# Patient Record
Sex: MALE | Race: WHITE | NOT HISPANIC OR LATINO | URBAN - METROPOLITAN AREA
[De-identification: names, ages, dates, MRNs, and addresses within clinical notes are randomized per-mention and may not be internally consistent; named-entity substitution may affect disease eponyms.]

---

## 2018-07-01 ENCOUNTER — OUTPATIENT (OUTPATIENT)
Dept: OUTPATIENT SERVICES | Facility: HOSPITAL | Age: 68
LOS: 1 days | End: 2018-07-01
Payer: MEDICARE

## 2018-07-01 DIAGNOSIS — Z95.1 PRESENCE OF AORTOCORONARY BYPASS GRAFT: Chronic | ICD-10-CM

## 2018-07-16 DIAGNOSIS — Z71.89 OTHER SPECIFIED COUNSELING: ICD-10-CM

## 2019-02-06 ENCOUNTER — APPOINTMENT (OUTPATIENT)
Dept: FAMILY MEDICINE | Facility: CLINIC | Age: 69
End: 2019-02-06

## 2019-03-01 PROCEDURE — G9005: CPT

## 2020-01-01 ENCOUNTER — INPATIENT (INPATIENT)
Facility: HOSPITAL | Age: 70
LOS: 19 days | DRG: 177 | End: 2020-12-23
Attending: STUDENT IN AN ORGANIZED HEALTH CARE EDUCATION/TRAINING PROGRAM | Admitting: STUDENT IN AN ORGANIZED HEALTH CARE EDUCATION/TRAINING PROGRAM
Payer: MEDICARE

## 2020-01-01 VITALS
TEMPERATURE: 99 F | OXYGEN SATURATION: 90 % | HEART RATE: 95 BPM | DIASTOLIC BLOOD PRESSURE: 81 MMHG | SYSTOLIC BLOOD PRESSURE: 124 MMHG | HEIGHT: 68 IN | RESPIRATION RATE: 105 BRPM | WEIGHT: 162.04 LBS

## 2020-01-01 VITALS
SYSTOLIC BLOOD PRESSURE: 82 MMHG | HEART RATE: 73 BPM | OXYGEN SATURATION: 86 % | RESPIRATION RATE: 26 BRPM | DIASTOLIC BLOOD PRESSURE: 54 MMHG

## 2020-01-01 DIAGNOSIS — R09.02 HYPOXEMIA: ICD-10-CM

## 2020-01-01 DIAGNOSIS — E11.9 TYPE 2 DIABETES MELLITUS WITHOUT COMPLICATIONS: ICD-10-CM

## 2020-01-01 DIAGNOSIS — Z95.1 PRESENCE OF AORTOCORONARY BYPASS GRAFT: Chronic | ICD-10-CM

## 2020-01-01 DIAGNOSIS — U07.1 COVID-19: ICD-10-CM

## 2020-01-01 DIAGNOSIS — R19.7 DIARRHEA, UNSPECIFIED: ICD-10-CM

## 2020-01-01 DIAGNOSIS — I10 ESSENTIAL (PRIMARY) HYPERTENSION: ICD-10-CM

## 2020-01-01 DIAGNOSIS — F43.20 ADJUSTMENT DISORDER, UNSPECIFIED: ICD-10-CM

## 2020-01-01 DIAGNOSIS — Z95.810 PRESENCE OF AUTOMATIC (IMPLANTABLE) CARDIAC DEFIBRILLATOR: Chronic | ICD-10-CM

## 2020-01-01 LAB
24R-OH-CALCIDIOL SERPL-MCNC: 33.9 NG/ML — SIGNIFICANT CHANGE UP (ref 30–80)
A1C WITH ESTIMATED AVERAGE GLUCOSE RESULT: 6.3 % — HIGH (ref 4–5.6)
ACANTHOCYTES BLD QL SMEAR: SLIGHT — SIGNIFICANT CHANGE UP
ALBUMIN SERPL ELPH-MCNC: 1.6 G/DL — LOW (ref 3.3–5.2)
ALBUMIN SERPL ELPH-MCNC: 1.7 G/DL — LOW (ref 3.3–5.2)
ALBUMIN SERPL ELPH-MCNC: 1.8 G/DL — LOW (ref 3.3–5.2)
ALBUMIN SERPL ELPH-MCNC: 1.8 G/DL — LOW (ref 3.3–5.2)
ALBUMIN SERPL ELPH-MCNC: 1.9 G/DL — LOW (ref 3.3–5.2)
ALBUMIN SERPL ELPH-MCNC: 2 G/DL — LOW (ref 3.3–5.2)
ALBUMIN SERPL ELPH-MCNC: 2.1 G/DL — LOW (ref 3.3–5.2)
ALBUMIN SERPL ELPH-MCNC: 2.7 G/DL — LOW (ref 3.3–5.2)
ALBUMIN SERPL ELPH-MCNC: 2.8 G/DL — LOW (ref 3.3–5.2)
ALBUMIN SERPL ELPH-MCNC: 2.9 G/DL — LOW (ref 3.3–5.2)
ALBUMIN SERPL ELPH-MCNC: 2.9 G/DL — LOW (ref 3.3–5.2)
ALBUMIN SERPL ELPH-MCNC: 3 G/DL — LOW (ref 3.3–5.2)
ALBUMIN SERPL ELPH-MCNC: 3.1 G/DL — LOW (ref 3.3–5.2)
ALBUMIN SERPL ELPH-MCNC: 3.2 G/DL — LOW (ref 3.3–5.2)
ALBUMIN SERPL ELPH-MCNC: 3.3 G/DL — SIGNIFICANT CHANGE UP (ref 3.3–5.2)
ALBUMIN SERPL ELPH-MCNC: 3.3 G/DL — SIGNIFICANT CHANGE UP (ref 3.3–5.2)
ALBUMIN SERPL ELPH-MCNC: 3.9 G/DL — SIGNIFICANT CHANGE UP (ref 3.3–5.2)
ALP SERPL-CCNC: 105 U/L — SIGNIFICANT CHANGE UP (ref 40–120)
ALP SERPL-CCNC: 112 U/L — SIGNIFICANT CHANGE UP (ref 40–120)
ALP SERPL-CCNC: 112 U/L — SIGNIFICANT CHANGE UP (ref 40–120)
ALP SERPL-CCNC: 165 U/L — HIGH (ref 40–120)
ALP SERPL-CCNC: 219 U/L — HIGH (ref 40–120)
ALP SERPL-CCNC: 44 U/L — SIGNIFICANT CHANGE UP (ref 40–120)
ALP SERPL-CCNC: 45 U/L — SIGNIFICANT CHANGE UP (ref 40–120)
ALP SERPL-CCNC: 47 U/L — SIGNIFICANT CHANGE UP (ref 40–120)
ALP SERPL-CCNC: 47 U/L — SIGNIFICANT CHANGE UP (ref 40–120)
ALP SERPL-CCNC: 51 U/L — SIGNIFICANT CHANGE UP (ref 40–120)
ALP SERPL-CCNC: 52 U/L — SIGNIFICANT CHANGE UP (ref 40–120)
ALP SERPL-CCNC: 59 U/L — SIGNIFICANT CHANGE UP (ref 40–120)
ALP SERPL-CCNC: 66 U/L — SIGNIFICANT CHANGE UP (ref 40–120)
ALP SERPL-CCNC: 67 U/L — SIGNIFICANT CHANGE UP (ref 40–120)
ALP SERPL-CCNC: 68 U/L — SIGNIFICANT CHANGE UP (ref 40–120)
ALP SERPL-CCNC: 71 U/L — SIGNIFICANT CHANGE UP (ref 40–120)
ALP SERPL-CCNC: 84 U/L — SIGNIFICANT CHANGE UP (ref 40–120)
ALP SERPL-CCNC: 87 U/L — SIGNIFICANT CHANGE UP (ref 40–120)
ALP SERPL-CCNC: 87 U/L — SIGNIFICANT CHANGE UP (ref 40–120)
ALP SERPL-CCNC: 91 U/L — SIGNIFICANT CHANGE UP (ref 40–120)
ALP SERPL-CCNC: 92 U/L — SIGNIFICANT CHANGE UP (ref 40–120)
ALP SERPL-CCNC: 93 U/L — SIGNIFICANT CHANGE UP (ref 40–120)
ALP SERPL-CCNC: 97 U/L — SIGNIFICANT CHANGE UP (ref 40–120)
ALT FLD-CCNC: 16 U/L — SIGNIFICANT CHANGE UP
ALT FLD-CCNC: 17 U/L — SIGNIFICANT CHANGE UP
ALT FLD-CCNC: 17 U/L — SIGNIFICANT CHANGE UP
ALT FLD-CCNC: 18 U/L — SIGNIFICANT CHANGE UP
ALT FLD-CCNC: 20 U/L — SIGNIFICANT CHANGE UP
ALT FLD-CCNC: 21 U/L — SIGNIFICANT CHANGE UP
ALT FLD-CCNC: 21 U/L — SIGNIFICANT CHANGE UP
ALT FLD-CCNC: 23 U/L — SIGNIFICANT CHANGE UP
ALT FLD-CCNC: 23 U/L — SIGNIFICANT CHANGE UP
ALT FLD-CCNC: 28 U/L — SIGNIFICANT CHANGE UP
ALT FLD-CCNC: 29 U/L — SIGNIFICANT CHANGE UP
ALT FLD-CCNC: 29 U/L — SIGNIFICANT CHANGE UP
ALT FLD-CCNC: 30 U/L — SIGNIFICANT CHANGE UP
ALT FLD-CCNC: 30 U/L — SIGNIFICANT CHANGE UP
ALT FLD-CCNC: 31 U/L — SIGNIFICANT CHANGE UP
ALT FLD-CCNC: 31 U/L — SIGNIFICANT CHANGE UP
ALT FLD-CCNC: 32 U/L — SIGNIFICANT CHANGE UP
ALT FLD-CCNC: 37 U/L — SIGNIFICANT CHANGE UP
ALT FLD-CCNC: 38 U/L — SIGNIFICANT CHANGE UP
ANION GAP SERPL CALC-SCNC: 10 MMOL/L — SIGNIFICANT CHANGE UP (ref 5–17)
ANION GAP SERPL CALC-SCNC: 10 MMOL/L — SIGNIFICANT CHANGE UP (ref 5–17)
ANION GAP SERPL CALC-SCNC: 11 MMOL/L — SIGNIFICANT CHANGE UP (ref 5–17)
ANION GAP SERPL CALC-SCNC: 12 MMOL/L — SIGNIFICANT CHANGE UP (ref 5–17)
ANION GAP SERPL CALC-SCNC: 13 MMOL/L — SIGNIFICANT CHANGE UP (ref 5–17)
ANION GAP SERPL CALC-SCNC: 13 MMOL/L — SIGNIFICANT CHANGE UP (ref 5–17)
ANION GAP SERPL CALC-SCNC: 8 MMOL/L — SIGNIFICANT CHANGE UP (ref 5–17)
ANION GAP SERPL CALC-SCNC: 8 MMOL/L — SIGNIFICANT CHANGE UP (ref 5–17)
ANION GAP SERPL CALC-SCNC: 9 MMOL/L — SIGNIFICANT CHANGE UP (ref 5–17)
ANION GAP SERPL CALC-SCNC: 9 MMOL/L — SIGNIFICANT CHANGE UP (ref 5–17)
ANISOCYTOSIS BLD QL: SLIGHT — SIGNIFICANT CHANGE UP
APPEARANCE UR: CLEAR — SIGNIFICANT CHANGE UP
APPEARANCE UR: CLEAR — SIGNIFICANT CHANGE UP
APTT BLD: 36.3 SEC — HIGH (ref 27.5–35.5)
APTT BLD: 38 SEC — HIGH (ref 27.5–35.5)
AST SERPL-CCNC: 15 U/L — SIGNIFICANT CHANGE UP
AST SERPL-CCNC: 18 U/L — SIGNIFICANT CHANGE UP
AST SERPL-CCNC: 19 U/L — SIGNIFICANT CHANGE UP
AST SERPL-CCNC: 19 U/L — SIGNIFICANT CHANGE UP
AST SERPL-CCNC: 20 U/L — SIGNIFICANT CHANGE UP
AST SERPL-CCNC: 21 U/L — SIGNIFICANT CHANGE UP
AST SERPL-CCNC: 21 U/L — SIGNIFICANT CHANGE UP
AST SERPL-CCNC: 25 U/L — SIGNIFICANT CHANGE UP
AST SERPL-CCNC: 26 U/L — SIGNIFICANT CHANGE UP
AST SERPL-CCNC: 26 U/L — SIGNIFICANT CHANGE UP
AST SERPL-CCNC: 27 U/L — SIGNIFICANT CHANGE UP
AST SERPL-CCNC: 28 U/L — SIGNIFICANT CHANGE UP
AST SERPL-CCNC: 31 U/L — SIGNIFICANT CHANGE UP
AST SERPL-CCNC: 31 U/L — SIGNIFICANT CHANGE UP
AST SERPL-CCNC: 33 U/L — SIGNIFICANT CHANGE UP
AST SERPL-CCNC: 38 U/L — SIGNIFICANT CHANGE UP
AST SERPL-CCNC: 39 U/L — SIGNIFICANT CHANGE UP
AST SERPL-CCNC: 45 U/L — HIGH
AST SERPL-CCNC: 46 U/L — HIGH
AST SERPL-CCNC: 52 U/L — HIGH
AST SERPL-CCNC: 54 U/L — HIGH
AST SERPL-CCNC: 63 U/L — HIGH
B PERT DNA SPEC QL NAA+PROBE: SIGNIFICANT CHANGE UP
BACTERIA # UR AUTO: ABNORMAL
BACTERIA # UR AUTO: ABNORMAL
BASE EXCESS BLDA CALC-SCNC: -1.6 MMOL/L — SIGNIFICANT CHANGE UP (ref -2–2)
BASOPHILS # BLD AUTO: 0 K/UL — SIGNIFICANT CHANGE UP (ref 0–0.2)
BASOPHILS # BLD AUTO: 0.01 K/UL — SIGNIFICANT CHANGE UP (ref 0–0.2)
BASOPHILS # BLD AUTO: 0.04 K/UL — SIGNIFICANT CHANGE UP (ref 0–0.2)
BASOPHILS # BLD AUTO: 0.15 K/UL — SIGNIFICANT CHANGE UP (ref 0–0.2)
BASOPHILS # BLD AUTO: 0.17 K/UL — SIGNIFICANT CHANGE UP (ref 0–0.2)
BASOPHILS NFR BLD AUTO: 0 % — SIGNIFICANT CHANGE UP (ref 0–2)
BASOPHILS NFR BLD AUTO: 0.2 % — SIGNIFICANT CHANGE UP (ref 0–2)
BASOPHILS NFR BLD AUTO: 0.2 % — SIGNIFICANT CHANGE UP (ref 0–2)
BASOPHILS NFR BLD AUTO: 0.5 % — SIGNIFICANT CHANGE UP (ref 0–2)
BASOPHILS NFR BLD AUTO: 0.6 % — SIGNIFICANT CHANGE UP (ref 0–2)
BILIRUB DIRECT SERPL-MCNC: 0.1 MG/DL — SIGNIFICANT CHANGE UP (ref 0–0.3)
BILIRUB DIRECT SERPL-MCNC: 0.2 MG/DL — SIGNIFICANT CHANGE UP (ref 0–0.3)
BILIRUB DIRECT SERPL-MCNC: 0.3 MG/DL — SIGNIFICANT CHANGE UP (ref 0–0.3)
BILIRUB DIRECT SERPL-MCNC: 0.4 MG/DL — HIGH (ref 0–0.3)
BILIRUB INDIRECT FLD-MCNC: 0.3 MG/DL — SIGNIFICANT CHANGE UP (ref 0.2–1)
BILIRUB INDIRECT FLD-MCNC: 0.4 MG/DL — SIGNIFICANT CHANGE UP (ref 0.2–1)
BILIRUB INDIRECT FLD-MCNC: 0.6 MG/DL — SIGNIFICANT CHANGE UP (ref 0.2–1)
BILIRUB INDIRECT FLD-MCNC: 0.7 MG/DL — SIGNIFICANT CHANGE UP (ref 0.2–1)
BILIRUB INDIRECT FLD-MCNC: 0.9 MG/DL — SIGNIFICANT CHANGE UP (ref 0.2–1)
BILIRUB INDIRECT FLD-MCNC: 1 MG/DL — SIGNIFICANT CHANGE UP (ref 0.2–1)
BILIRUB INDIRECT FLD-MCNC: 1.2 MG/DL — HIGH (ref 0.2–1)
BILIRUB INDIRECT FLD-MCNC: 1.3 MG/DL — HIGH (ref 0.2–1)
BILIRUB INDIRECT FLD-MCNC: SIGNIFICANT CHANGE UP MG/DL (ref 0.2–1)
BILIRUB SERPL-MCNC: 0.4 MG/DL — SIGNIFICANT CHANGE UP (ref 0.4–2)
BILIRUB SERPL-MCNC: 0.4 MG/DL — SIGNIFICANT CHANGE UP (ref 0.4–2)
BILIRUB SERPL-MCNC: 0.5 MG/DL — SIGNIFICANT CHANGE UP (ref 0.4–2)
BILIRUB SERPL-MCNC: 0.5 MG/DL — SIGNIFICANT CHANGE UP (ref 0.4–2)
BILIRUB SERPL-MCNC: 0.6 MG/DL — SIGNIFICANT CHANGE UP (ref 0.4–2)
BILIRUB SERPL-MCNC: 0.8 MG/DL — SIGNIFICANT CHANGE UP (ref 0.4–2)
BILIRUB SERPL-MCNC: 0.9 MG/DL — SIGNIFICANT CHANGE UP (ref 0.4–2)
BILIRUB SERPL-MCNC: 1 MG/DL — SIGNIFICANT CHANGE UP (ref 0.4–2)
BILIRUB SERPL-MCNC: 1.1 MG/DL — SIGNIFICANT CHANGE UP (ref 0.4–2)
BILIRUB SERPL-MCNC: 1.1 MG/DL — SIGNIFICANT CHANGE UP (ref 0.4–2)
BILIRUB SERPL-MCNC: 1.2 MG/DL — SIGNIFICANT CHANGE UP (ref 0.4–2)
BILIRUB SERPL-MCNC: 1.3 MG/DL — SIGNIFICANT CHANGE UP (ref 0.4–2)
BILIRUB SERPL-MCNC: 1.4 MG/DL — SIGNIFICANT CHANGE UP (ref 0.4–2)
BILIRUB SERPL-MCNC: 1.5 MG/DL — SIGNIFICANT CHANGE UP (ref 0.4–2)
BILIRUB SERPL-MCNC: 1.5 MG/DL — SIGNIFICANT CHANGE UP (ref 0.4–2)
BILIRUB SERPL-MCNC: 1.6 MG/DL — SIGNIFICANT CHANGE UP (ref 0.4–2)
BILIRUB SERPL-MCNC: 1.6 MG/DL — SIGNIFICANT CHANGE UP (ref 0.4–2)
BILIRUB UR-MCNC: NEGATIVE — SIGNIFICANT CHANGE UP
BILIRUB UR-MCNC: NEGATIVE — SIGNIFICANT CHANGE UP
BUN SERPL-MCNC: 22 MG/DL — HIGH (ref 8–20)
BUN SERPL-MCNC: 26 MG/DL — HIGH (ref 8–20)
BUN SERPL-MCNC: 32 MG/DL — HIGH (ref 8–20)
BUN SERPL-MCNC: 35 MG/DL — HIGH (ref 8–20)
BUN SERPL-MCNC: 37 MG/DL — HIGH (ref 8–20)
BUN SERPL-MCNC: 38 MG/DL — HIGH (ref 8–20)
BUN SERPL-MCNC: 39 MG/DL — HIGH (ref 8–20)
BUN SERPL-MCNC: 40 MG/DL — HIGH (ref 8–20)
BUN SERPL-MCNC: 40 MG/DL — HIGH (ref 8–20)
BUN SERPL-MCNC: 45 MG/DL — HIGH (ref 8–20)
BUN SERPL-MCNC: 50 MG/DL — HIGH (ref 8–20)
BUN SERPL-MCNC: 50 MG/DL — HIGH (ref 8–20)
BUN SERPL-MCNC: 52 MG/DL — HIGH (ref 8–20)
BUN SERPL-MCNC: 52 MG/DL — HIGH (ref 8–20)
BUN SERPL-MCNC: 53 MG/DL — HIGH (ref 8–20)
BUN SERPL-MCNC: 56 MG/DL — HIGH (ref 8–20)
BURR CELLS BLD QL SMEAR: PRESENT — SIGNIFICANT CHANGE UP
C DIFF BY PCR RESULT: SIGNIFICANT CHANGE UP
C DIFF TOX GENS STL QL NAA+PROBE: SIGNIFICANT CHANGE UP
C PNEUM DNA SPEC QL NAA+PROBE: SIGNIFICANT CHANGE UP
CALCIUM SERPL-MCNC: 7.8 MG/DL — LOW (ref 8.6–10.2)
CALCIUM SERPL-MCNC: 8 MG/DL — LOW (ref 8.6–10.2)
CALCIUM SERPL-MCNC: 8.1 MG/DL — LOW (ref 8.6–10.2)
CALCIUM SERPL-MCNC: 8.2 MG/DL — LOW (ref 8.6–10.2)
CALCIUM SERPL-MCNC: 8.4 MG/DL — LOW (ref 8.6–10.2)
CALCIUM SERPL-MCNC: 8.4 MG/DL — LOW (ref 8.6–10.2)
CALCIUM SERPL-MCNC: 8.6 MG/DL — SIGNIFICANT CHANGE UP (ref 8.6–10.2)
CALCIUM SERPL-MCNC: 8.7 MG/DL — SIGNIFICANT CHANGE UP (ref 8.6–10.2)
CALCIUM SERPL-MCNC: 8.7 MG/DL — SIGNIFICANT CHANGE UP (ref 8.6–10.2)
CALCIUM SERPL-MCNC: 9 MG/DL — SIGNIFICANT CHANGE UP (ref 8.6–10.2)
CALCIUM SERPL-MCNC: 9 MG/DL — SIGNIFICANT CHANGE UP (ref 8.6–10.2)
CALCIUM SERPL-MCNC: 9.2 MG/DL — SIGNIFICANT CHANGE UP (ref 8.6–10.2)
CALCIUM SERPL-MCNC: 9.2 MG/DL — SIGNIFICANT CHANGE UP (ref 8.6–10.2)
CALCIUM SERPL-MCNC: 9.3 MG/DL — SIGNIFICANT CHANGE UP (ref 8.6–10.2)
CHLORIDE SERPL-SCNC: 100 MMOL/L — SIGNIFICANT CHANGE UP (ref 98–107)
CHLORIDE SERPL-SCNC: 101 MMOL/L — SIGNIFICANT CHANGE UP (ref 98–107)
CHLORIDE SERPL-SCNC: 102 MMOL/L — SIGNIFICANT CHANGE UP (ref 98–107)
CHLORIDE SERPL-SCNC: 102 MMOL/L — SIGNIFICANT CHANGE UP (ref 98–107)
CHLORIDE SERPL-SCNC: 103 MMOL/L — SIGNIFICANT CHANGE UP (ref 98–107)
CHLORIDE SERPL-SCNC: 104 MMOL/L — SIGNIFICANT CHANGE UP (ref 98–107)
CHLORIDE SERPL-SCNC: 105 MMOL/L — SIGNIFICANT CHANGE UP (ref 98–107)
CHLORIDE SERPL-SCNC: 106 MMOL/L — SIGNIFICANT CHANGE UP (ref 98–107)
CHLORIDE SERPL-SCNC: 106 MMOL/L — SIGNIFICANT CHANGE UP (ref 98–107)
CHLORIDE SERPL-SCNC: 107 MMOL/L — SIGNIFICANT CHANGE UP (ref 98–107)
CHLORIDE SERPL-SCNC: 107 MMOL/L — SIGNIFICANT CHANGE UP (ref 98–107)
CHLORIDE SERPL-SCNC: 114 MMOL/L — HIGH (ref 98–107)
CHLORIDE SERPL-SCNC: 95 MMOL/L — LOW (ref 98–107)
CHLORIDE SERPL-SCNC: 97 MMOL/L — LOW (ref 98–107)
CHLORIDE SERPL-SCNC: 98 MMOL/L — SIGNIFICANT CHANGE UP (ref 98–107)
CHLORIDE SERPL-SCNC: 99 MMOL/L — SIGNIFICANT CHANGE UP (ref 98–107)
CK SERPL-CCNC: 48 U/L — SIGNIFICANT CHANGE UP (ref 30–200)
CO2 SERPL-SCNC: 19 MMOL/L — LOW (ref 22–29)
CO2 SERPL-SCNC: 19 MMOL/L — LOW (ref 22–29)
CO2 SERPL-SCNC: 20 MMOL/L — LOW (ref 22–29)
CO2 SERPL-SCNC: 21 MMOL/L — LOW (ref 22–29)
CO2 SERPL-SCNC: 22 MMOL/L — SIGNIFICANT CHANGE UP (ref 22–29)
CO2 SERPL-SCNC: 22 MMOL/L — SIGNIFICANT CHANGE UP (ref 22–29)
CO2 SERPL-SCNC: 23 MMOL/L — SIGNIFICANT CHANGE UP (ref 22–29)
CO2 SERPL-SCNC: 24 MMOL/L — SIGNIFICANT CHANGE UP (ref 22–29)
CO2 SERPL-SCNC: 25 MMOL/L — SIGNIFICANT CHANGE UP (ref 22–29)
CO2 SERPL-SCNC: 25 MMOL/L — SIGNIFICANT CHANGE UP (ref 22–29)
CO2 SERPL-SCNC: 26 MMOL/L — SIGNIFICANT CHANGE UP (ref 22–29)
CO2 SERPL-SCNC: 26 MMOL/L — SIGNIFICANT CHANGE UP (ref 22–29)
CO2 SERPL-SCNC: 27 MMOL/L — SIGNIFICANT CHANGE UP (ref 22–29)
CO2 SERPL-SCNC: 28 MMOL/L — SIGNIFICANT CHANGE UP (ref 22–29)
COLOR SPEC: YELLOW — SIGNIFICANT CHANGE UP
COLOR SPEC: YELLOW — SIGNIFICANT CHANGE UP
CREAT SERPL-MCNC: 0.48 MG/DL — LOW (ref 0.5–1.3)
CREAT SERPL-MCNC: 0.5 MG/DL — SIGNIFICANT CHANGE UP (ref 0.5–1.3)
CREAT SERPL-MCNC: 0.53 MG/DL — SIGNIFICANT CHANGE UP (ref 0.5–1.3)
CREAT SERPL-MCNC: 0.59 MG/DL — SIGNIFICANT CHANGE UP (ref 0.5–1.3)
CREAT SERPL-MCNC: 0.62 MG/DL — SIGNIFICANT CHANGE UP (ref 0.5–1.3)
CREAT SERPL-MCNC: 0.63 MG/DL — SIGNIFICANT CHANGE UP (ref 0.5–1.3)
CREAT SERPL-MCNC: 0.64 MG/DL — SIGNIFICANT CHANGE UP (ref 0.5–1.3)
CREAT SERPL-MCNC: 0.67 MG/DL — SIGNIFICANT CHANGE UP (ref 0.5–1.3)
CREAT SERPL-MCNC: 0.69 MG/DL — SIGNIFICANT CHANGE UP (ref 0.5–1.3)
CREAT SERPL-MCNC: 0.73 MG/DL — SIGNIFICANT CHANGE UP (ref 0.5–1.3)
CREAT SERPL-MCNC: 0.76 MG/DL — SIGNIFICANT CHANGE UP (ref 0.5–1.3)
CREAT SERPL-MCNC: 0.8 MG/DL — SIGNIFICANT CHANGE UP (ref 0.5–1.3)
CREAT SERPL-MCNC: 0.82 MG/DL — SIGNIFICANT CHANGE UP (ref 0.5–1.3)
CREAT SERPL-MCNC: 0.84 MG/DL — SIGNIFICANT CHANGE UP (ref 0.5–1.3)
CREAT SERPL-MCNC: 0.84 MG/DL — SIGNIFICANT CHANGE UP (ref 0.5–1.3)
CREAT SERPL-MCNC: 0.85 MG/DL — SIGNIFICANT CHANGE UP (ref 0.5–1.3)
CREAT SERPL-MCNC: 0.85 MG/DL — SIGNIFICANT CHANGE UP (ref 0.5–1.3)
CREAT SERPL-MCNC: 0.87 MG/DL — SIGNIFICANT CHANGE UP (ref 0.5–1.3)
CREAT SERPL-MCNC: 0.88 MG/DL — SIGNIFICANT CHANGE UP (ref 0.5–1.3)
CREAT SERPL-MCNC: 0.89 MG/DL — SIGNIFICANT CHANGE UP (ref 0.5–1.3)
CREAT SERPL-MCNC: 0.95 MG/DL — SIGNIFICANT CHANGE UP (ref 0.5–1.3)
CREAT SERPL-MCNC: 0.97 MG/DL — SIGNIFICANT CHANGE UP (ref 0.5–1.3)
CREAT SERPL-MCNC: 1.06 MG/DL — SIGNIFICANT CHANGE UP (ref 0.5–1.3)
CRP SERPL-MCNC: 0.9 MG/DL — HIGH (ref 0–0.4)
CRP SERPL-MCNC: 1.22 MG/DL — HIGH (ref 0–0.4)
CRP SERPL-MCNC: 1.24 MG/DL — HIGH (ref 0–0.4)
CRP SERPL-MCNC: 1.57 MG/DL — HIGH (ref 0–0.4)
CRP SERPL-MCNC: 10.23 MG/DL — HIGH (ref 0–0.4)
CRP SERPL-MCNC: 12.8 MG/DL — HIGH (ref 0–0.4)
CRP SERPL-MCNC: 15.69 MG/DL — HIGH (ref 0–0.4)
CRP SERPL-MCNC: 5.5 MG/DL — HIGH (ref 0–0.4)
CULTURE RESULTS: SIGNIFICANT CHANGE UP
D DIMER BLD IA.RAPID-MCNC: 170 NG/ML DDU — SIGNIFICANT CHANGE UP
D DIMER BLD IA.RAPID-MCNC: 220 NG/ML DDU — SIGNIFICANT CHANGE UP
D DIMER BLD IA.RAPID-MCNC: 578 NG/ML DDU — HIGH
D DIMER BLD IA.RAPID-MCNC: <150 NG/ML DDU — SIGNIFICANT CHANGE UP
DACRYOCYTES BLD QL SMEAR: SLIGHT — SIGNIFICANT CHANGE UP
DIFF PNL FLD: ABNORMAL
DIFF PNL FLD: NEGATIVE — SIGNIFICANT CHANGE UP
DIGOXIN SERPL-MCNC: 1 NG/ML — SIGNIFICANT CHANGE UP (ref 0.8–2)
ELLIPTOCYTES BLD QL SMEAR: SLIGHT — SIGNIFICANT CHANGE UP
ELLIPTOCYTES BLD QL SMEAR: SLIGHT — SIGNIFICANT CHANGE UP
EOSINOPHIL # BLD AUTO: 0 K/UL — SIGNIFICANT CHANGE UP (ref 0–0.5)
EOSINOPHIL # BLD AUTO: 0.02 K/UL — SIGNIFICANT CHANGE UP (ref 0–0.5)
EOSINOPHIL # BLD AUTO: 0.04 K/UL — SIGNIFICANT CHANGE UP (ref 0–0.5)
EOSINOPHIL NFR BLD AUTO: 0 % — SIGNIFICANT CHANGE UP (ref 0–6)
EOSINOPHIL NFR BLD AUTO: 0.1 % — SIGNIFICANT CHANGE UP (ref 0–6)
EOSINOPHIL NFR BLD AUTO: 0.2 % — SIGNIFICANT CHANGE UP (ref 0–6)
EPI CELLS # UR: SIGNIFICANT CHANGE UP
EPI CELLS # UR: SIGNIFICANT CHANGE UP
EPO SERPL-MCNC: 82.7 MIU/ML — HIGH (ref 2.6–18.5)
ERYTHROCYTE [SEDIMENTATION RATE] IN BLOOD: 15 MM/HR — SIGNIFICANT CHANGE UP (ref 0–20)
ESTIMATED AVERAGE GLUCOSE: 134 MG/DL — HIGH (ref 68–114)
FERRITIN SERPL-MCNC: 1050 NG/ML — HIGH (ref 30–400)
FERRITIN SERPL-MCNC: 1275 NG/ML — HIGH (ref 30–400)
FERRITIN SERPL-MCNC: 1398 NG/ML — HIGH (ref 30–400)
FERRITIN SERPL-MCNC: 1607 NG/ML — HIGH (ref 30–400)
FERRITIN SERPL-MCNC: 1906 NG/ML — HIGH (ref 30–400)
FERRITIN SERPL-MCNC: 2557 NG/ML — HIGH (ref 30–400)
FERRITIN SERPL-MCNC: 823 NG/ML — HIGH (ref 30–400)
FERRITIN SERPL-MCNC: 978 NG/ML — HIGH (ref 30–400)
FIBRINOGEN PPP-MCNC: 587 MG/DL — HIGH (ref 290–520)
FLU A RESULT: SIGNIFICANT CHANGE UP
FLU A RESULT: SIGNIFICANT CHANGE UP
FLUAV AG NPH QL: SIGNIFICANT CHANGE UP
FLUAV H1 2009 PAND RNA SPEC QL NAA+PROBE: SIGNIFICANT CHANGE UP
FLUAV H1 RNA SPEC QL NAA+PROBE: SIGNIFICANT CHANGE UP
FLUAV H3 RNA SPEC QL NAA+PROBE: SIGNIFICANT CHANGE UP
FLUAV SUBTYP SPEC NAA+PROBE: SIGNIFICANT CHANGE UP
FLUBV AG NPH QL: SIGNIFICANT CHANGE UP
FLUBV RNA SPEC QL NAA+PROBE: SIGNIFICANT CHANGE UP
GAS PNL BLDA: SIGNIFICANT CHANGE UP
GIANT PLATELETS BLD QL SMEAR: PRESENT — SIGNIFICANT CHANGE UP
GIANT PLATELETS BLD QL SMEAR: PRESENT — SIGNIFICANT CHANGE UP
GLUCOSE BLDC GLUCOMTR-MCNC: 100 MG/DL — HIGH (ref 70–99)
GLUCOSE BLDC GLUCOMTR-MCNC: 106 MG/DL — HIGH (ref 70–99)
GLUCOSE BLDC GLUCOMTR-MCNC: 111 MG/DL — HIGH (ref 70–99)
GLUCOSE BLDC GLUCOMTR-MCNC: 116 MG/DL — HIGH (ref 70–99)
GLUCOSE BLDC GLUCOMTR-MCNC: 118 MG/DL — HIGH (ref 70–99)
GLUCOSE BLDC GLUCOMTR-MCNC: 119 MG/DL — HIGH (ref 70–99)
GLUCOSE BLDC GLUCOMTR-MCNC: 120 MG/DL — HIGH (ref 70–99)
GLUCOSE BLDC GLUCOMTR-MCNC: 121 MG/DL — HIGH (ref 70–99)
GLUCOSE BLDC GLUCOMTR-MCNC: 123 MG/DL — HIGH (ref 70–99)
GLUCOSE BLDC GLUCOMTR-MCNC: 125 MG/DL — HIGH (ref 70–99)
GLUCOSE BLDC GLUCOMTR-MCNC: 126 MG/DL — HIGH (ref 70–99)
GLUCOSE BLDC GLUCOMTR-MCNC: 128 MG/DL — HIGH (ref 70–99)
GLUCOSE BLDC GLUCOMTR-MCNC: 129 MG/DL — HIGH (ref 70–99)
GLUCOSE BLDC GLUCOMTR-MCNC: 131 MG/DL — HIGH (ref 70–99)
GLUCOSE BLDC GLUCOMTR-MCNC: 132 MG/DL — HIGH (ref 70–99)
GLUCOSE BLDC GLUCOMTR-MCNC: 133 MG/DL — HIGH (ref 70–99)
GLUCOSE BLDC GLUCOMTR-MCNC: 135 MG/DL — HIGH (ref 70–99)
GLUCOSE BLDC GLUCOMTR-MCNC: 135 MG/DL — HIGH (ref 70–99)
GLUCOSE BLDC GLUCOMTR-MCNC: 136 MG/DL — HIGH (ref 70–99)
GLUCOSE BLDC GLUCOMTR-MCNC: 136 MG/DL — HIGH (ref 70–99)
GLUCOSE BLDC GLUCOMTR-MCNC: 138 MG/DL — HIGH (ref 70–99)
GLUCOSE BLDC GLUCOMTR-MCNC: 140 MG/DL — HIGH (ref 70–99)
GLUCOSE BLDC GLUCOMTR-MCNC: 141 MG/DL — HIGH (ref 70–99)
GLUCOSE BLDC GLUCOMTR-MCNC: 141 MG/DL — HIGH (ref 70–99)
GLUCOSE BLDC GLUCOMTR-MCNC: 143 MG/DL — HIGH (ref 70–99)
GLUCOSE BLDC GLUCOMTR-MCNC: 143 MG/DL — HIGH (ref 70–99)
GLUCOSE BLDC GLUCOMTR-MCNC: 145 MG/DL — HIGH (ref 70–99)
GLUCOSE BLDC GLUCOMTR-MCNC: 146 MG/DL — HIGH (ref 70–99)
GLUCOSE BLDC GLUCOMTR-MCNC: 146 MG/DL — HIGH (ref 70–99)
GLUCOSE BLDC GLUCOMTR-MCNC: 150 MG/DL — HIGH (ref 70–99)
GLUCOSE BLDC GLUCOMTR-MCNC: 150 MG/DL — HIGH (ref 70–99)
GLUCOSE BLDC GLUCOMTR-MCNC: 153 MG/DL — HIGH (ref 70–99)
GLUCOSE BLDC GLUCOMTR-MCNC: 158 MG/DL — HIGH (ref 70–99)
GLUCOSE BLDC GLUCOMTR-MCNC: 159 MG/DL — HIGH (ref 70–99)
GLUCOSE BLDC GLUCOMTR-MCNC: 161 MG/DL — HIGH (ref 70–99)
GLUCOSE BLDC GLUCOMTR-MCNC: 164 MG/DL — HIGH (ref 70–99)
GLUCOSE BLDC GLUCOMTR-MCNC: 164 MG/DL — HIGH (ref 70–99)
GLUCOSE BLDC GLUCOMTR-MCNC: 168 MG/DL — HIGH (ref 70–99)
GLUCOSE BLDC GLUCOMTR-MCNC: 169 MG/DL — HIGH (ref 70–99)
GLUCOSE BLDC GLUCOMTR-MCNC: 169 MG/DL — HIGH (ref 70–99)
GLUCOSE BLDC GLUCOMTR-MCNC: 173 MG/DL — HIGH (ref 70–99)
GLUCOSE BLDC GLUCOMTR-MCNC: 173 MG/DL — HIGH (ref 70–99)
GLUCOSE BLDC GLUCOMTR-MCNC: 175 MG/DL — HIGH (ref 70–99)
GLUCOSE BLDC GLUCOMTR-MCNC: 179 MG/DL — HIGH (ref 70–99)
GLUCOSE BLDC GLUCOMTR-MCNC: 180 MG/DL — HIGH (ref 70–99)
GLUCOSE BLDC GLUCOMTR-MCNC: 182 MG/DL — HIGH (ref 70–99)
GLUCOSE BLDC GLUCOMTR-MCNC: 183 MG/DL — HIGH (ref 70–99)
GLUCOSE BLDC GLUCOMTR-MCNC: 184 MG/DL — HIGH (ref 70–99)
GLUCOSE BLDC GLUCOMTR-MCNC: 186 MG/DL — HIGH (ref 70–99)
GLUCOSE BLDC GLUCOMTR-MCNC: 187 MG/DL — HIGH (ref 70–99)
GLUCOSE BLDC GLUCOMTR-MCNC: 188 MG/DL — HIGH (ref 70–99)
GLUCOSE BLDC GLUCOMTR-MCNC: 189 MG/DL — HIGH (ref 70–99)
GLUCOSE BLDC GLUCOMTR-MCNC: 191 MG/DL — HIGH (ref 70–99)
GLUCOSE BLDC GLUCOMTR-MCNC: 192 MG/DL — HIGH (ref 70–99)
GLUCOSE BLDC GLUCOMTR-MCNC: 196 MG/DL — HIGH (ref 70–99)
GLUCOSE BLDC GLUCOMTR-MCNC: 200 MG/DL — HIGH (ref 70–99)
GLUCOSE BLDC GLUCOMTR-MCNC: 201 MG/DL — HIGH (ref 70–99)
GLUCOSE BLDC GLUCOMTR-MCNC: 201 MG/DL — HIGH (ref 70–99)
GLUCOSE BLDC GLUCOMTR-MCNC: 202 MG/DL — HIGH (ref 70–99)
GLUCOSE BLDC GLUCOMTR-MCNC: 202 MG/DL — HIGH (ref 70–99)
GLUCOSE BLDC GLUCOMTR-MCNC: 224 MG/DL — HIGH (ref 70–99)
GLUCOSE BLDC GLUCOMTR-MCNC: 233 MG/DL — HIGH (ref 70–99)
GLUCOSE BLDC GLUCOMTR-MCNC: 235 MG/DL — HIGH (ref 70–99)
GLUCOSE BLDC GLUCOMTR-MCNC: 274 MG/DL — HIGH (ref 70–99)
GLUCOSE BLDC GLUCOMTR-MCNC: 361 MG/DL — HIGH (ref 70–99)
GLUCOSE BLDC GLUCOMTR-MCNC: 68 MG/DL — LOW (ref 70–99)
GLUCOSE BLDC GLUCOMTR-MCNC: 71 MG/DL — SIGNIFICANT CHANGE UP (ref 70–99)
GLUCOSE BLDC GLUCOMTR-MCNC: 77 MG/DL — SIGNIFICANT CHANGE UP (ref 70–99)
GLUCOSE BLDC GLUCOMTR-MCNC: 91 MG/DL — SIGNIFICANT CHANGE UP (ref 70–99)
GLUCOSE SERPL-MCNC: 101 MG/DL — HIGH (ref 70–99)
GLUCOSE SERPL-MCNC: 117 MG/DL — HIGH (ref 70–99)
GLUCOSE SERPL-MCNC: 128 MG/DL — HIGH (ref 70–99)
GLUCOSE SERPL-MCNC: 132 MG/DL — HIGH (ref 70–99)
GLUCOSE SERPL-MCNC: 132 MG/DL — HIGH (ref 70–99)
GLUCOSE SERPL-MCNC: 135 MG/DL — HIGH (ref 70–99)
GLUCOSE SERPL-MCNC: 154 MG/DL — HIGH (ref 70–99)
GLUCOSE SERPL-MCNC: 167 MG/DL — HIGH (ref 70–99)
GLUCOSE SERPL-MCNC: 170 MG/DL — HIGH (ref 70–99)
GLUCOSE SERPL-MCNC: 185 MG/DL — HIGH (ref 70–99)
GLUCOSE SERPL-MCNC: 204 MG/DL — HIGH (ref 70–99)
GLUCOSE SERPL-MCNC: 217 MG/DL — HIGH (ref 70–99)
GLUCOSE SERPL-MCNC: 226 MG/DL — HIGH (ref 70–99)
GLUCOSE SERPL-MCNC: 240 MG/DL — HIGH (ref 70–99)
GLUCOSE SERPL-MCNC: 59 MG/DL — LOW (ref 70–99)
GLUCOSE SERPL-MCNC: 93 MG/DL — SIGNIFICANT CHANGE UP (ref 70–99)
GLUCOSE UR QL: 250 MG/DL
GLUCOSE UR QL: NEGATIVE MG/DL — SIGNIFICANT CHANGE UP
GRAN CASTS # UR COMP ASSIST: ABNORMAL /LPF
HADV DNA SPEC QL NAA+PROBE: SIGNIFICANT CHANGE UP
HCO3 BLDA-SCNC: 23 MMOL/L — SIGNIFICANT CHANGE UP (ref 20–26)
HCOV PNL SPEC NAA+PROBE: SIGNIFICANT CHANGE UP
HCT VFR BLD CALC: 40 % — SIGNIFICANT CHANGE UP (ref 39–50)
HCT VFR BLD CALC: 41.6 % — SIGNIFICANT CHANGE UP (ref 39–50)
HCT VFR BLD CALC: 41.8 % — SIGNIFICANT CHANGE UP (ref 39–50)
HCT VFR BLD CALC: 41.9 % — SIGNIFICANT CHANGE UP (ref 39–50)
HCT VFR BLD CALC: 43.7 % — SIGNIFICANT CHANGE UP (ref 39–50)
HCT VFR BLD CALC: 44.3 % — SIGNIFICANT CHANGE UP (ref 39–50)
HCT VFR BLD CALC: 44.5 % — SIGNIFICANT CHANGE UP (ref 39–50)
HCT VFR BLD CALC: 44.9 % — SIGNIFICANT CHANGE UP (ref 39–50)
HCT VFR BLD CALC: 47.4 % — SIGNIFICANT CHANGE UP (ref 39–50)
HCT VFR BLD CALC: 49.2 % — SIGNIFICANT CHANGE UP (ref 39–50)
HCT VFR BLD CALC: 51.1 % — HIGH (ref 39–50)
HCT VFR BLD CALC: 51.5 % — HIGH (ref 39–50)
HCT VFR BLD CALC: 56 % — HIGH (ref 39–50)
HCT VFR BLD CALC: 56.2 % — HIGH (ref 39–50)
HCV AB S/CO SERPL IA: 0.09 S/CO — SIGNIFICANT CHANGE UP (ref 0–0.99)
HCV AB SERPL-IMP: SIGNIFICANT CHANGE UP
HGB BLD-MCNC: 13.6 G/DL — SIGNIFICANT CHANGE UP (ref 13–17)
HGB BLD-MCNC: 14 G/DL — SIGNIFICANT CHANGE UP (ref 13–17)
HGB BLD-MCNC: 14.1 G/DL — SIGNIFICANT CHANGE UP (ref 13–17)
HGB BLD-MCNC: 14.2 G/DL — SIGNIFICANT CHANGE UP (ref 13–17)
HGB BLD-MCNC: 14.6 G/DL — SIGNIFICANT CHANGE UP (ref 13–17)
HGB BLD-MCNC: 14.9 G/DL — SIGNIFICANT CHANGE UP (ref 13–17)
HGB BLD-MCNC: 15 G/DL — SIGNIFICANT CHANGE UP (ref 13–17)
HGB BLD-MCNC: 15 G/DL — SIGNIFICANT CHANGE UP (ref 13–17)
HGB BLD-MCNC: 15.9 G/DL — SIGNIFICANT CHANGE UP (ref 13–17)
HGB BLD-MCNC: 16.5 G/DL — SIGNIFICANT CHANGE UP (ref 13–17)
HGB BLD-MCNC: 17.2 G/DL — HIGH (ref 13–17)
HGB BLD-MCNC: 17.4 G/DL — HIGH (ref 13–17)
HGB BLD-MCNC: 18.7 G/DL — HIGH (ref 13–17)
HGB BLD-MCNC: 19.3 G/DL — CRITICAL HIGH (ref 13–17)
HMPV RNA SPEC QL NAA+PROBE: SIGNIFICANT CHANGE UP
HPIV1 RNA SPEC QL NAA+PROBE: SIGNIFICANT CHANGE UP
HPIV2 RNA SPEC QL NAA+PROBE: SIGNIFICANT CHANGE UP
HPIV3 RNA SPEC QL NAA+PROBE: SIGNIFICANT CHANGE UP
HPIV4 RNA SPEC QL NAA+PROBE: SIGNIFICANT CHANGE UP
HYALINE CASTS # UR AUTO: ABNORMAL /LPF
IMM GRANULOCYTES NFR BLD AUTO: 0.4 % — SIGNIFICANT CHANGE UP (ref 0–1.5)
IMM GRANULOCYTES NFR BLD AUTO: 0.7 % — SIGNIFICANT CHANGE UP (ref 0–1.5)
IMM GRANULOCYTES NFR BLD AUTO: 1.6 % — HIGH (ref 0–1.5)
IMM GRANULOCYTES NFR BLD AUTO: 3.2 % — HIGH (ref 0–1.5)
IMM GRANULOCYTES NFR BLD AUTO: 3.4 % — HIGH (ref 0–1.5)
INR BLD: 1.37 RATIO — HIGH (ref 0.88–1.16)
INR BLD: 1.95 RATIO — HIGH (ref 0.88–1.16)
KETONES UR-MCNC: ABNORMAL
KETONES UR-MCNC: NEGATIVE — SIGNIFICANT CHANGE UP
LACTATE BLDV-MCNC: 0.8 MMOL/L — SIGNIFICANT CHANGE UP (ref 0.5–2)
LACTATE BLDV-MCNC: 3 MMOL/L — HIGH (ref 0.5–2)
LACTATE SERPL-SCNC: 2.4 MMOL/L — HIGH (ref 0.5–2)
LACTATE SERPL-SCNC: 2.8 MMOL/L — HIGH (ref 0.5–2)
LDH SERPL L TO P-CCNC: 324 U/L — HIGH (ref 98–192)
LDH SERPL L TO P-CCNC: 347 U/L — HIGH (ref 98–192)
LDH SERPL L TO P-CCNC: 390 U/L — HIGH (ref 98–192)
LDH SERPL L TO P-CCNC: 411 U/L — HIGH (ref 98–192)
LDH SERPL L TO P-CCNC: 420 U/L — HIGH (ref 98–192)
LDH SERPL L TO P-CCNC: 463 U/L — HIGH (ref 98–192)
LEUKOCYTE ESTERASE UR-ACNC: NEGATIVE — SIGNIFICANT CHANGE UP
LEUKOCYTE ESTERASE UR-ACNC: NEGATIVE — SIGNIFICANT CHANGE UP
LYMPHOCYTES # BLD AUTO: 0 % — LOW (ref 13–44)
LYMPHOCYTES # BLD AUTO: 0 K/UL — LOW (ref 1–3.3)
LYMPHOCYTES # BLD AUTO: 0.23 K/UL — LOW (ref 1–3.3)
LYMPHOCYTES # BLD AUTO: 0.24 K/UL — LOW (ref 1–3.3)
LYMPHOCYTES # BLD AUTO: 0.49 K/UL — LOW (ref 1–3.3)
LYMPHOCYTES # BLD AUTO: 0.71 K/UL — LOW (ref 1–3.3)
LYMPHOCYTES # BLD AUTO: 0.8 % — LOW (ref 13–44)
LYMPHOCYTES # BLD AUTO: 0.81 K/UL — LOW (ref 1–3.3)
LYMPHOCYTES # BLD AUTO: 0.85 K/UL — LOW (ref 1–3.3)
LYMPHOCYTES # BLD AUTO: 0.89 K/UL — LOW (ref 1–3.3)
LYMPHOCYTES # BLD AUTO: 0.91 K/UL — LOW (ref 1–3.3)
LYMPHOCYTES # BLD AUTO: 0.96 K/UL — LOW (ref 1–3.3)
LYMPHOCYTES # BLD AUTO: 1.61 K/UL — SIGNIFICANT CHANGE UP (ref 1–3.3)
LYMPHOCYTES # BLD AUTO: 11.3 % — LOW (ref 13–44)
LYMPHOCYTES # BLD AUTO: 14.5 % — SIGNIFICANT CHANGE UP (ref 13–44)
LYMPHOCYTES # BLD AUTO: 2.6 % — LOW (ref 13–44)
LYMPHOCYTES # BLD AUTO: 3.5 % — LOW (ref 13–44)
LYMPHOCYTES # BLD AUTO: 4.6 % — LOW (ref 13–44)
LYMPHOCYTES # BLD AUTO: 5.3 % — LOW (ref 13–44)
LYMPHOCYTES # BLD AUTO: 9.9 % — LOW (ref 13–44)
MACROCYTES BLD QL: SLIGHT — SIGNIFICANT CHANGE UP
MAGNESIUM SERPL-MCNC: 2 MG/DL — SIGNIFICANT CHANGE UP (ref 1.6–2.6)
MAGNESIUM SERPL-MCNC: 2 MG/DL — SIGNIFICANT CHANGE UP (ref 1.8–2.6)
MAGNESIUM SERPL-MCNC: 2.1 MG/DL — SIGNIFICANT CHANGE UP (ref 1.6–2.6)
MAGNESIUM SERPL-MCNC: 2.2 MG/DL — SIGNIFICANT CHANGE UP (ref 1.6–2.6)
MAGNESIUM SERPL-MCNC: 2.2 MG/DL — SIGNIFICANT CHANGE UP (ref 1.6–2.6)
MAGNESIUM SERPL-MCNC: 2.4 MG/DL — SIGNIFICANT CHANGE UP (ref 1.6–2.6)
MAGNESIUM SERPL-MCNC: 2.5 MG/DL — SIGNIFICANT CHANGE UP (ref 1.6–2.6)
MAGNESIUM SERPL-MCNC: 2.5 MG/DL — SIGNIFICANT CHANGE UP (ref 1.6–2.6)
MANUAL SMEAR VERIFICATION: SIGNIFICANT CHANGE UP
MCHC RBC-ENTMCNC: 32.2 PG — SIGNIFICANT CHANGE UP (ref 27–34)
MCHC RBC-ENTMCNC: 32.3 PG — SIGNIFICANT CHANGE UP (ref 27–34)
MCHC RBC-ENTMCNC: 32.4 PG — SIGNIFICANT CHANGE UP (ref 27–34)
MCHC RBC-ENTMCNC: 32.4 PG — SIGNIFICANT CHANGE UP (ref 27–34)
MCHC RBC-ENTMCNC: 32.5 PG — SIGNIFICANT CHANGE UP (ref 27–34)
MCHC RBC-ENTMCNC: 32.6 PG — SIGNIFICANT CHANGE UP (ref 27–34)
MCHC RBC-ENTMCNC: 32.7 PG — SIGNIFICANT CHANGE UP (ref 27–34)
MCHC RBC-ENTMCNC: 32.7 PG — SIGNIFICANT CHANGE UP (ref 27–34)
MCHC RBC-ENTMCNC: 32.9 PG — SIGNIFICANT CHANGE UP (ref 27–34)
MCHC RBC-ENTMCNC: 32.9 PG — SIGNIFICANT CHANGE UP (ref 27–34)
MCHC RBC-ENTMCNC: 33 GM/DL — SIGNIFICANT CHANGE UP (ref 32–36)
MCHC RBC-ENTMCNC: 33 PG — SIGNIFICANT CHANGE UP (ref 27–34)
MCHC RBC-ENTMCNC: 33.3 PG — SIGNIFICANT CHANGE UP (ref 27–34)
MCHC RBC-ENTMCNC: 33.4 GM/DL — SIGNIFICANT CHANGE UP (ref 32–36)
MCHC RBC-ENTMCNC: 33.4 GM/DL — SIGNIFICANT CHANGE UP (ref 32–36)
MCHC RBC-ENTMCNC: 33.5 GM/DL — SIGNIFICANT CHANGE UP (ref 32–36)
MCHC RBC-ENTMCNC: 33.7 GM/DL — SIGNIFICANT CHANGE UP (ref 32–36)
MCHC RBC-ENTMCNC: 33.7 GM/DL — SIGNIFICANT CHANGE UP (ref 32–36)
MCHC RBC-ENTMCNC: 33.8 GM/DL — SIGNIFICANT CHANGE UP (ref 32–36)
MCHC RBC-ENTMCNC: 34 GM/DL — SIGNIFICANT CHANGE UP (ref 32–36)
MCHC RBC-ENTMCNC: 34.1 GM/DL — SIGNIFICANT CHANGE UP (ref 32–36)
MCHC RBC-ENTMCNC: 34.3 GM/DL — SIGNIFICANT CHANGE UP (ref 32–36)
MCHC RBC-ENTMCNC: 34.3 GM/DL — SIGNIFICANT CHANGE UP (ref 32–36)
MCV RBC AUTO: 95.6 FL — SIGNIFICANT CHANGE UP (ref 80–100)
MCV RBC AUTO: 95.7 FL — SIGNIFICANT CHANGE UP (ref 80–100)
MCV RBC AUTO: 95.8 FL — SIGNIFICANT CHANGE UP (ref 80–100)
MCV RBC AUTO: 95.9 FL — SIGNIFICANT CHANGE UP (ref 80–100)
MCV RBC AUTO: 96.7 FL — SIGNIFICANT CHANGE UP (ref 80–100)
MCV RBC AUTO: 96.8 FL — SIGNIFICANT CHANGE UP (ref 80–100)
MCV RBC AUTO: 96.9 FL — SIGNIFICANT CHANGE UP (ref 80–100)
MCV RBC AUTO: 97.1 FL — SIGNIFICANT CHANGE UP (ref 80–100)
MCV RBC AUTO: 97.2 FL — SIGNIFICANT CHANGE UP (ref 80–100)
MCV RBC AUTO: 97.4 FL — SIGNIFICANT CHANGE UP (ref 80–100)
MCV RBC AUTO: 97.6 FL — SIGNIFICANT CHANGE UP (ref 80–100)
MCV RBC AUTO: 97.8 FL — SIGNIFICANT CHANGE UP (ref 80–100)
MCV RBC AUTO: 97.9 FL — SIGNIFICANT CHANGE UP (ref 80–100)
MCV RBC AUTO: 98.4 FL — SIGNIFICANT CHANGE UP (ref 80–100)
MELD SCORE WITH DIALYSIS: 14 POINTS — SIGNIFICANT CHANGE UP
METAMYELOCYTES # FLD: 0.9 % — HIGH (ref 0–0)
MONOCYTES # BLD AUTO: 0.17 K/UL — SIGNIFICANT CHANGE UP (ref 0–0.9)
MONOCYTES # BLD AUTO: 0.48 K/UL — SIGNIFICANT CHANGE UP (ref 0–0.9)
MONOCYTES # BLD AUTO: 0.59 K/UL — SIGNIFICANT CHANGE UP (ref 0–0.9)
MONOCYTES # BLD AUTO: 0.6 K/UL — SIGNIFICANT CHANGE UP (ref 0–0.9)
MONOCYTES # BLD AUTO: 0.63 K/UL — SIGNIFICANT CHANGE UP (ref 0–0.9)
MONOCYTES # BLD AUTO: 0.73 K/UL — SIGNIFICANT CHANGE UP (ref 0–0.9)
MONOCYTES # BLD AUTO: 0.79 K/UL — SIGNIFICANT CHANGE UP (ref 0–0.9)
MONOCYTES # BLD AUTO: 1.01 K/UL — HIGH (ref 0–0.9)
MONOCYTES # BLD AUTO: 1.11 K/UL — HIGH (ref 0–0.9)
MONOCYTES # BLD AUTO: 1.58 K/UL — HIGH (ref 0–0.9)
MONOCYTES # BLD AUTO: 1.79 K/UL — HIGH (ref 0–0.9)
MONOCYTES NFR BLD AUTO: 1.7 % — LOW (ref 2–14)
MONOCYTES NFR BLD AUTO: 2.6 % — SIGNIFICANT CHANGE UP (ref 2–14)
MONOCYTES NFR BLD AUTO: 5.3 % — SIGNIFICANT CHANGE UP (ref 2–14)
MONOCYTES NFR BLD AUTO: 5.3 % — SIGNIFICANT CHANGE UP (ref 2–14)
MONOCYTES NFR BLD AUTO: 5.6 % — SIGNIFICANT CHANGE UP (ref 2–14)
MONOCYTES NFR BLD AUTO: 6.1 % — SIGNIFICANT CHANGE UP (ref 2–14)
MONOCYTES NFR BLD AUTO: 7.7 % — SIGNIFICANT CHANGE UP (ref 2–14)
MONOCYTES NFR BLD AUTO: 7.8 % — SIGNIFICANT CHANGE UP (ref 2–14)
MONOCYTES NFR BLD AUTO: 9.8 % — SIGNIFICANT CHANGE UP (ref 2–14)
MYELOCYTES NFR BLD: 0.9 % — HIGH (ref 0–0)
NEUTROPHILS # BLD AUTO: 1.73 K/UL — LOW (ref 1.8–7.4)
NEUTROPHILS # BLD AUTO: 16.85 K/UL — HIGH (ref 1.8–7.4)
NEUTROPHILS # BLD AUTO: 18.31 K/UL — HIGH (ref 1.8–7.4)
NEUTROPHILS # BLD AUTO: 22.2 K/UL — HIGH (ref 1.8–7.4)
NEUTROPHILS # BLD AUTO: 22.41 K/UL — HIGH (ref 1.8–7.4)
NEUTROPHILS # BLD AUTO: 26.75 K/UL — HIGH (ref 1.8–7.4)
NEUTROPHILS # BLD AUTO: 27.91 K/UL — HIGH (ref 1.8–7.4)
NEUTROPHILS # BLD AUTO: 28.32 K/UL — HIGH (ref 1.8–7.4)
NEUTROPHILS # BLD AUTO: 3.67 K/UL — SIGNIFICANT CHANGE UP (ref 1.8–7.4)
NEUTROPHILS # BLD AUTO: 32.85 K/UL — HIGH (ref 1.8–7.4)
NEUTROPHILS # BLD AUTO: 6.69 K/UL — SIGNIFICANT CHANGE UP (ref 1.8–7.4)
NEUTROPHILS NFR BLD AUTO: 75.1 % — SIGNIFICANT CHANGE UP (ref 43–77)
NEUTROPHILS NFR BLD AUTO: 80 % — HIGH (ref 43–77)
NEUTROPHILS NFR BLD AUTO: 81.7 % — HIGH (ref 43–77)
NEUTROPHILS NFR BLD AUTO: 86.3 % — HIGH (ref 43–77)
NEUTROPHILS NFR BLD AUTO: 88.1 % — HIGH (ref 43–77)
NEUTROPHILS NFR BLD AUTO: 88.1 % — HIGH (ref 43–77)
NEUTROPHILS NFR BLD AUTO: 88.6 % — HIGH (ref 43–77)
NEUTROPHILS NFR BLD AUTO: 88.6 % — HIGH (ref 43–77)
NEUTROPHILS NFR BLD AUTO: 93.9 % — HIGH (ref 43–77)
NEUTROPHILS NFR BLD AUTO: 94.8 % — HIGH (ref 43–77)
NEUTROPHILS NFR BLD AUTO: 96.5 % — HIGH (ref 43–77)
NEUTS BAND # BLD: 3.5 % — SIGNIFICANT CHANGE UP (ref 0–8)
NITRITE UR-MCNC: NEGATIVE — SIGNIFICANT CHANGE UP
NITRITE UR-MCNC: NEGATIVE — SIGNIFICANT CHANGE UP
NT-PROBNP SERPL-SCNC: 855 PG/ML — HIGH (ref 0–300)
OVALOCYTES BLD QL SMEAR: SLIGHT — SIGNIFICANT CHANGE UP
PCO2 BLDA: 30 MMHG — LOW (ref 35–45)
PH BLDA: 7.45 — SIGNIFICANT CHANGE UP (ref 7.35–7.45)
PH UR: 5 — SIGNIFICANT CHANGE UP (ref 5–8)
PH UR: 6 — SIGNIFICANT CHANGE UP (ref 5–8)
PHOSPHATE SERPL-MCNC: 3.2 MG/DL — SIGNIFICANT CHANGE UP (ref 2.4–4.7)
PHOSPHATE SERPL-MCNC: 3.5 MG/DL — SIGNIFICANT CHANGE UP (ref 2.4–4.7)
PLAT MORPH BLD: ABNORMAL
PLAT MORPH BLD: NORMAL — SIGNIFICANT CHANGE UP
PLATELET # BLD AUTO: 100 K/UL — LOW (ref 150–400)
PLATELET # BLD AUTO: 105 K/UL — LOW (ref 150–400)
PLATELET # BLD AUTO: 114 K/UL — LOW (ref 150–400)
PLATELET # BLD AUTO: 115 K/UL — LOW (ref 150–400)
PLATELET # BLD AUTO: 115 K/UL — LOW (ref 150–400)
PLATELET # BLD AUTO: 124 K/UL — LOW (ref 150–400)
PLATELET # BLD AUTO: 139 K/UL — LOW (ref 150–400)
PLATELET # BLD AUTO: 141 K/UL — LOW (ref 150–400)
PLATELET # BLD AUTO: 191 K/UL — SIGNIFICANT CHANGE UP (ref 150–400)
PLATELET # BLD AUTO: 235 K/UL — SIGNIFICANT CHANGE UP (ref 150–400)
PLATELET # BLD AUTO: 239 K/UL — SIGNIFICANT CHANGE UP (ref 150–400)
PLATELET # BLD AUTO: 248 K/UL — SIGNIFICANT CHANGE UP (ref 150–400)
PLATELET # BLD AUTO: 269 K/UL — SIGNIFICANT CHANGE UP (ref 150–400)
PLATELET # BLD AUTO: 95 K/UL — LOW (ref 150–400)
PLATELET COUNT - ESTIMATE: NORMAL — SIGNIFICANT CHANGE UP
PO2 BLDA: 86 MMHG — SIGNIFICANT CHANGE UP (ref 83–108)
POIKILOCYTOSIS BLD QL AUTO: SIGNIFICANT CHANGE UP
POIKILOCYTOSIS BLD QL AUTO: SLIGHT — SIGNIFICANT CHANGE UP
POTASSIUM SERPL-MCNC: 4.4 MMOL/L — SIGNIFICANT CHANGE UP (ref 3.5–5.3)
POTASSIUM SERPL-MCNC: 4.6 MMOL/L — SIGNIFICANT CHANGE UP (ref 3.5–5.3)
POTASSIUM SERPL-MCNC: 4.7 MMOL/L — SIGNIFICANT CHANGE UP (ref 3.5–5.3)
POTASSIUM SERPL-MCNC: 4.9 MMOL/L — SIGNIFICANT CHANGE UP (ref 3.5–5.3)
POTASSIUM SERPL-MCNC: 4.9 MMOL/L — SIGNIFICANT CHANGE UP (ref 3.5–5.3)
POTASSIUM SERPL-MCNC: 5 MMOL/L — SIGNIFICANT CHANGE UP (ref 3.5–5.3)
POTASSIUM SERPL-MCNC: 5.1 MMOL/L — SIGNIFICANT CHANGE UP (ref 3.5–5.3)
POTASSIUM SERPL-MCNC: 5.2 MMOL/L — SIGNIFICANT CHANGE UP (ref 3.5–5.3)
POTASSIUM SERPL-MCNC: 5.3 MMOL/L — SIGNIFICANT CHANGE UP (ref 3.5–5.3)
POTASSIUM SERPL-MCNC: 5.4 MMOL/L — HIGH (ref 3.5–5.3)
POTASSIUM SERPL-MCNC: 5.8 MMOL/L — HIGH (ref 3.5–5.3)
POTASSIUM SERPL-SCNC: 4.4 MMOL/L — SIGNIFICANT CHANGE UP (ref 3.5–5.3)
POTASSIUM SERPL-SCNC: 4.6 MMOL/L — SIGNIFICANT CHANGE UP (ref 3.5–5.3)
POTASSIUM SERPL-SCNC: 4.7 MMOL/L — SIGNIFICANT CHANGE UP (ref 3.5–5.3)
POTASSIUM SERPL-SCNC: 4.9 MMOL/L — SIGNIFICANT CHANGE UP (ref 3.5–5.3)
POTASSIUM SERPL-SCNC: 4.9 MMOL/L — SIGNIFICANT CHANGE UP (ref 3.5–5.3)
POTASSIUM SERPL-SCNC: 5 MMOL/L — SIGNIFICANT CHANGE UP (ref 3.5–5.3)
POTASSIUM SERPL-SCNC: 5.1 MMOL/L — SIGNIFICANT CHANGE UP (ref 3.5–5.3)
POTASSIUM SERPL-SCNC: 5.2 MMOL/L — SIGNIFICANT CHANGE UP (ref 3.5–5.3)
POTASSIUM SERPL-SCNC: 5.3 MMOL/L — SIGNIFICANT CHANGE UP (ref 3.5–5.3)
POTASSIUM SERPL-SCNC: 5.4 MMOL/L — HIGH (ref 3.5–5.3)
POTASSIUM SERPL-SCNC: 5.8 MMOL/L — HIGH (ref 3.5–5.3)
PROCALCITONIN SERPL-MCNC: 0.04 NG/ML — SIGNIFICANT CHANGE UP (ref 0.02–0.1)
PROCALCITONIN SERPL-MCNC: 0.08 NG/ML — SIGNIFICANT CHANGE UP (ref 0.02–0.1)
PROCALCITONIN SERPL-MCNC: 0.11 NG/ML — HIGH (ref 0.02–0.1)
PROCALCITONIN SERPL-MCNC: 0.11 NG/ML — HIGH (ref 0.02–0.1)
PROCALCITONIN SERPL-MCNC: 0.24 NG/ML — HIGH (ref 0.02–0.1)
PROCALCITONIN SERPL-MCNC: 0.29 NG/ML — HIGH (ref 0.02–0.1)
PROCALCITONIN SERPL-MCNC: 0.32 NG/ML — HIGH (ref 0.02–0.1)
PROCALCITONIN SERPL-MCNC: 0.54 NG/ML — HIGH (ref 0.02–0.1)
PROT SERPL-MCNC: 5.5 G/DL — LOW (ref 6.6–8.7)
PROT SERPL-MCNC: 5.8 G/DL — LOW (ref 6.6–8.7)
PROT SERPL-MCNC: 5.8 G/DL — LOW (ref 6.6–8.7)
PROT SERPL-MCNC: 5.9 G/DL — LOW (ref 6.6–8.7)
PROT SERPL-MCNC: 5.9 G/DL — LOW (ref 6.6–8.7)
PROT SERPL-MCNC: 6 G/DL — LOW (ref 6.6–8.7)
PROT SERPL-MCNC: 6.2 G/DL — LOW (ref 6.6–8.7)
PROT SERPL-MCNC: 6.2 G/DL — LOW (ref 6.6–8.7)
PROT SERPL-MCNC: 6.3 G/DL — LOW (ref 6.6–8.7)
PROT SERPL-MCNC: 6.4 G/DL — LOW (ref 6.6–8.7)
PROT SERPL-MCNC: 6.5 G/DL — LOW (ref 6.6–8.7)
PROT SERPL-MCNC: 6.6 G/DL — SIGNIFICANT CHANGE UP (ref 6.6–8.7)
PROT SERPL-MCNC: 6.7 G/DL — SIGNIFICANT CHANGE UP (ref 6.6–8.7)
PROT SERPL-MCNC: 6.9 G/DL — SIGNIFICANT CHANGE UP (ref 6.6–8.7)
PROT SERPL-MCNC: 6.9 G/DL — SIGNIFICANT CHANGE UP (ref 6.6–8.7)
PROT SERPL-MCNC: 7 G/DL — SIGNIFICANT CHANGE UP (ref 6.6–8.7)
PROT SERPL-MCNC: 7.1 G/DL — SIGNIFICANT CHANGE UP (ref 6.6–8.7)
PROT SERPL-MCNC: 7.2 G/DL — SIGNIFICANT CHANGE UP (ref 6.6–8.7)
PROT SERPL-MCNC: 7.2 G/DL — SIGNIFICANT CHANGE UP (ref 6.6–8.7)
PROT SERPL-MCNC: 7.4 G/DL — SIGNIFICANT CHANGE UP (ref 6.6–8.7)
PROT SERPL-MCNC: 7.6 G/DL — SIGNIFICANT CHANGE UP (ref 6.6–8.7)
PROT SERPL-MCNC: 7.7 G/DL — SIGNIFICANT CHANGE UP (ref 6.6–8.7)
PROT UR-MCNC: 30 MG/DL
PROT UR-MCNC: NEGATIVE MG/DL — SIGNIFICANT CHANGE UP
PROTHROM AB SERPL-ACNC: 15.7 SEC — HIGH (ref 10.6–13.6)
PROTHROM AB SERPL-ACNC: 21.9 SEC — HIGH (ref 10.6–13.6)
RAPID RVP RESULT: DETECTED
RBC # BLD: 4.17 M/UL — LOW (ref 4.2–5.8)
RBC # BLD: 4.31 M/UL — SIGNIFICANT CHANGE UP (ref 4.2–5.8)
RBC # BLD: 4.32 M/UL — SIGNIFICANT CHANGE UP (ref 4.2–5.8)
RBC # BLD: 4.35 M/UL — SIGNIFICANT CHANGE UP (ref 4.2–5.8)
RBC # BLD: 4.53 M/UL — SIGNIFICANT CHANGE UP (ref 4.2–5.8)
RBC # BLD: 4.56 M/UL — SIGNIFICANT CHANGE UP (ref 4.2–5.8)
RBC # BLD: 4.6 M/UL — SIGNIFICANT CHANGE UP (ref 4.2–5.8)
RBC # BLD: 4.61 M/UL — SIGNIFICANT CHANGE UP (ref 4.2–5.8)
RBC # BLD: 4.84 M/UL — SIGNIFICANT CHANGE UP (ref 4.2–5.8)
RBC # BLD: 5 M/UL — SIGNIFICANT CHANGE UP (ref 4.2–5.8)
RBC # BLD: 5.26 M/UL — SIGNIFICANT CHANGE UP (ref 4.2–5.8)
RBC # BLD: 5.38 M/UL — SIGNIFICANT CHANGE UP (ref 4.2–5.8)
RBC # BLD: 5.74 M/UL — SIGNIFICANT CHANGE UP (ref 4.2–5.8)
RBC # BLD: 5.8 M/UL — SIGNIFICANT CHANGE UP (ref 4.2–5.8)
RBC # FLD: 11.9 % — SIGNIFICANT CHANGE UP (ref 10.3–14.5)
RBC # FLD: 12 % — SIGNIFICANT CHANGE UP (ref 10.3–14.5)
RBC # FLD: 12.1 % — SIGNIFICANT CHANGE UP (ref 10.3–14.5)
RBC # FLD: 12.1 % — SIGNIFICANT CHANGE UP (ref 10.3–14.5)
RBC # FLD: 12.2 % — SIGNIFICANT CHANGE UP (ref 10.3–14.5)
RBC BLD AUTO: ABNORMAL
RBC CASTS # UR COMP ASSIST: SIGNIFICANT CHANGE UP /HPF (ref 0–4)
RBC CASTS # UR COMP ASSIST: SIGNIFICANT CHANGE UP /HPF (ref 0–4)
RSV RESULT: SIGNIFICANT CHANGE UP
RSV RNA RESP QL NAA+PROBE: SIGNIFICANT CHANGE UP
RV+EV RNA SPEC QL NAA+PROBE: SIGNIFICANT CHANGE UP
SAO2 % BLDA: 96 % — SIGNIFICANT CHANGE UP (ref 95–99)
SARS-COV-2 IGG SERPL QL IA: NEGATIVE — SIGNIFICANT CHANGE UP
SARS-COV-2 IGM SERPL IA-ACNC: <0.1 INDEX — SIGNIFICANT CHANGE UP
SARS-COV-2 RNA SPEC QL NAA+PROBE: DETECTED
SARS-COV-2 RNA SPEC QL NAA+PROBE: DETECTED
SODIUM SERPL-SCNC: 132 MMOL/L — LOW (ref 135–145)
SODIUM SERPL-SCNC: 134 MMOL/L — LOW (ref 135–145)
SODIUM SERPL-SCNC: 134 MMOL/L — LOW (ref 135–145)
SODIUM SERPL-SCNC: 135 MMOL/L — SIGNIFICANT CHANGE UP (ref 135–145)
SODIUM SERPL-SCNC: 136 MMOL/L — SIGNIFICANT CHANGE UP (ref 135–145)
SODIUM SERPL-SCNC: 138 MMOL/L — SIGNIFICANT CHANGE UP (ref 135–145)
SODIUM SERPL-SCNC: 138 MMOL/L — SIGNIFICANT CHANGE UP (ref 135–145)
SODIUM SERPL-SCNC: 139 MMOL/L — SIGNIFICANT CHANGE UP (ref 135–145)
SODIUM SERPL-SCNC: 140 MMOL/L — SIGNIFICANT CHANGE UP (ref 135–145)
SODIUM SERPL-SCNC: 140 MMOL/L — SIGNIFICANT CHANGE UP (ref 135–145)
SODIUM SERPL-SCNC: 145 MMOL/L — SIGNIFICANT CHANGE UP (ref 135–145)
SP GR SPEC: 1.01 — SIGNIFICANT CHANGE UP (ref 1.01–1.02)
SP GR SPEC: 1.02 — SIGNIFICANT CHANGE UP (ref 1.01–1.02)
SPECIMEN SOURCE: SIGNIFICANT CHANGE UP
TROPONIN T SERPL-MCNC: <0.01 NG/ML — SIGNIFICANT CHANGE UP (ref 0–0.06)
UROBILINOGEN FLD QL: NEGATIVE MG/DL — SIGNIFICANT CHANGE UP
UROBILINOGEN FLD QL: NEGATIVE MG/DL — SIGNIFICANT CHANGE UP
VARIANT LYMPHS # BLD: 0.9 % — SIGNIFICANT CHANGE UP (ref 0–6)
VARIANT LYMPHS # BLD: 2.6 % — SIGNIFICANT CHANGE UP (ref 0–6)
WBC # BLD: 19.02 K/UL — HIGH (ref 3.8–10.5)
WBC # BLD: 2.16 K/UL — LOW (ref 3.8–10.5)
WBC # BLD: 20.8 K/UL — HIGH (ref 3.8–10.5)
WBC # BLD: 21.22 K/UL — HIGH (ref 3.8–10.5)
WBC # BLD: 23.22 K/UL — HIGH (ref 3.8–10.5)
WBC # BLD: 25.72 K/UL — HIGH (ref 3.8–10.5)
WBC # BLD: 27.18 K/UL — HIGH (ref 3.8–10.5)
WBC # BLD: 28.22 K/UL — HIGH (ref 3.8–10.5)
WBC # BLD: 28.7 K/UL — HIGH (ref 3.8–10.5)
WBC # BLD: 30.3 K/UL — HIGH (ref 3.8–10.5)
WBC # BLD: 32.17 K/UL — HIGH (ref 3.8–10.5)
WBC # BLD: 34.98 K/UL — HIGH (ref 3.8–10.5)
WBC # BLD: 4.89 K/UL — SIGNIFICANT CHANGE UP (ref 3.8–10.5)
WBC # BLD: 8.19 K/UL — SIGNIFICANT CHANGE UP (ref 3.8–10.5)
WBC # FLD AUTO: 19.02 K/UL — HIGH (ref 3.8–10.5)
WBC # FLD AUTO: 2.16 K/UL — LOW (ref 3.8–10.5)
WBC # FLD AUTO: 20.8 K/UL — HIGH (ref 3.8–10.5)
WBC # FLD AUTO: 21.22 K/UL — HIGH (ref 3.8–10.5)
WBC # FLD AUTO: 23.22 K/UL — HIGH (ref 3.8–10.5)
WBC # FLD AUTO: 25.72 K/UL — HIGH (ref 3.8–10.5)
WBC # FLD AUTO: 27.18 K/UL — HIGH (ref 3.8–10.5)
WBC # FLD AUTO: 28.22 K/UL — HIGH (ref 3.8–10.5)
WBC # FLD AUTO: 28.7 K/UL — HIGH (ref 3.8–10.5)
WBC # FLD AUTO: 30.3 K/UL — HIGH (ref 3.8–10.5)
WBC # FLD AUTO: 32.17 K/UL — HIGH (ref 3.8–10.5)
WBC # FLD AUTO: 34.98 K/UL — HIGH (ref 3.8–10.5)
WBC # FLD AUTO: 4.89 K/UL — SIGNIFICANT CHANGE UP (ref 3.8–10.5)
WBC # FLD AUTO: 8.19 K/UL — SIGNIFICANT CHANGE UP (ref 3.8–10.5)
WBC UR QL: SIGNIFICANT CHANGE UP
WBC UR QL: SIGNIFICANT CHANGE UP

## 2020-01-01 PROCEDURE — 71045 X-RAY EXAM CHEST 1 VIEW: CPT

## 2020-01-01 PROCEDURE — 86140 C-REACTIVE PROTEIN: CPT

## 2020-01-01 PROCEDURE — 84295 ASSAY OF SERUM SODIUM: CPT

## 2020-01-01 PROCEDURE — 85384 FIBRINOGEN ACTIVITY: CPT

## 2020-01-01 PROCEDURE — 93970 EXTREMITY STUDY: CPT

## 2020-01-01 PROCEDURE — 90792 PSYCH DIAG EVAL W/MED SRVCS: CPT

## 2020-01-01 PROCEDURE — 94660 CPAP INITIATION&MGMT: CPT

## 2020-01-01 PROCEDURE — 82247 BILIRUBIN TOTAL: CPT

## 2020-01-01 PROCEDURE — 86803 HEPATITIS C AB TEST: CPT

## 2020-01-01 PROCEDURE — 93010 ELECTROCARDIOGRAM REPORT: CPT

## 2020-01-01 PROCEDURE — 99233 SBSQ HOSP IP/OBS HIGH 50: CPT

## 2020-01-01 PROCEDURE — 87631 RESP VIRUS 3-5 TARGETS: CPT

## 2020-01-01 PROCEDURE — 99222 1ST HOSP IP/OBS MODERATE 55: CPT

## 2020-01-01 PROCEDURE — 83036 HEMOGLOBIN GLYCOSYLATED A1C: CPT

## 2020-01-01 PROCEDURE — 99232 SBSQ HOSP IP/OBS MODERATE 35: CPT

## 2020-01-01 PROCEDURE — 99285 EMERGENCY DEPT VISIT HI MDM: CPT | Mod: 25

## 2020-01-01 PROCEDURE — 99223 1ST HOSP IP/OBS HIGH 75: CPT

## 2020-01-01 PROCEDURE — 87507 IADNA-DNA/RNA PROBE TQ 12-25: CPT

## 2020-01-01 PROCEDURE — 71250 CT THORAX DX C-: CPT | Mod: 26

## 2020-01-01 PROCEDURE — 99497 ADVNCD CARE PLAN 30 MIN: CPT

## 2020-01-01 PROCEDURE — 85730 THROMBOPLASTIN TIME PARTIAL: CPT

## 2020-01-01 PROCEDURE — 85610 PROTHROMBIN TIME: CPT

## 2020-01-01 PROCEDURE — 99291 CRITICAL CARE FIRST HOUR: CPT

## 2020-01-01 PROCEDURE — 80162 ASSAY OF DIGOXIN TOTAL: CPT

## 2020-01-01 PROCEDURE — 82306 VITAMIN D 25 HYDROXY: CPT

## 2020-01-01 PROCEDURE — 84145 PROCALCITONIN (PCT): CPT

## 2020-01-01 PROCEDURE — 83605 ASSAY OF LACTIC ACID: CPT

## 2020-01-01 PROCEDURE — 71045 X-RAY EXAM CHEST 1 VIEW: CPT | Mod: 26

## 2020-01-01 PROCEDURE — 86769 SARS-COV-2 COVID-19 ANTIBODY: CPT

## 2020-01-01 PROCEDURE — 97163 PT EVAL HIGH COMPLEX 45 MIN: CPT

## 2020-01-01 PROCEDURE — 80048 BASIC METABOLIC PNL TOTAL CA: CPT

## 2020-01-01 PROCEDURE — 84100 ASSAY OF PHOSPHORUS: CPT

## 2020-01-01 PROCEDURE — 82550 ASSAY OF CK (CPK): CPT

## 2020-01-01 PROCEDURE — U0003: CPT

## 2020-01-01 PROCEDURE — 82962 GLUCOSE BLOOD TEST: CPT

## 2020-01-01 PROCEDURE — 87045 FECES CULTURE AEROBIC BACT: CPT

## 2020-01-01 PROCEDURE — 82668 ASSAY OF ERYTHROPOIETIN: CPT

## 2020-01-01 PROCEDURE — 83880 ASSAY OF NATRIURETIC PEPTIDE: CPT

## 2020-01-01 PROCEDURE — 87493 C DIFF AMPLIFIED PROBE: CPT

## 2020-01-01 PROCEDURE — 83615 LACTATE (LD) (LDH) ENZYME: CPT

## 2020-01-01 PROCEDURE — 87046 STOOL CULTR AEROBIC BACT EA: CPT

## 2020-01-01 PROCEDURE — 85652 RBC SED RATE AUTOMATED: CPT

## 2020-01-01 PROCEDURE — 0225U NFCT DS DNA&RNA 21 SARSCOV2: CPT

## 2020-01-01 PROCEDURE — 93005 ELECTROCARDIOGRAM TRACING: CPT

## 2020-01-01 PROCEDURE — 87086 URINE CULTURE/COLONY COUNT: CPT

## 2020-01-01 PROCEDURE — 94640 AIRWAY INHALATION TREATMENT: CPT

## 2020-01-01 PROCEDURE — 87040 BLOOD CULTURE FOR BACTERIA: CPT

## 2020-01-01 PROCEDURE — 84484 ASSAY OF TROPONIN QUANT: CPT

## 2020-01-01 PROCEDURE — 80053 COMPREHEN METABOLIC PANEL: CPT

## 2020-01-01 PROCEDURE — 71250 CT THORAX DX C-: CPT

## 2020-01-01 PROCEDURE — 85025 COMPLETE CBC W/AUTO DIFF WBC: CPT

## 2020-01-01 PROCEDURE — 87177 OVA AND PARASITES SMEARS: CPT

## 2020-01-01 PROCEDURE — 96374 THER/PROPH/DIAG INJ IV PUSH: CPT

## 2020-01-01 PROCEDURE — 85379 FIBRIN DEGRADATION QUANT: CPT

## 2020-01-01 PROCEDURE — 96375 TX/PRO/DX INJ NEW DRUG ADDON: CPT

## 2020-01-01 PROCEDURE — 36415 COLL VENOUS BLD VENIPUNCTURE: CPT

## 2020-01-01 PROCEDURE — 80076 HEPATIC FUNCTION PANEL: CPT

## 2020-01-01 PROCEDURE — 83735 ASSAY OF MAGNESIUM: CPT

## 2020-01-01 PROCEDURE — 93970 EXTREMITY STUDY: CPT | Mod: 26

## 2020-01-01 PROCEDURE — 85027 COMPLETE CBC AUTOMATED: CPT

## 2020-01-01 PROCEDURE — 82803 BLOOD GASES ANY COMBINATION: CPT

## 2020-01-01 PROCEDURE — 82728 ASSAY OF FERRITIN: CPT

## 2020-01-01 PROCEDURE — 81001 URINALYSIS AUTO W/SCOPE: CPT

## 2020-01-01 PROCEDURE — 82565 ASSAY OF CREATININE: CPT

## 2020-01-01 PROCEDURE — 94760 N-INVAS EAR/PLS OXIMETRY 1: CPT

## 2020-01-01 RX ORDER — DEXTROSE 50 % IN WATER 50 %
50 SYRINGE (ML) INTRAVENOUS ONCE
Refills: 0 | Status: COMPLETED | OUTPATIENT
Start: 2020-01-01 | End: 2020-01-01

## 2020-01-01 RX ORDER — ENOXAPARIN SODIUM 100 MG/ML
40 INJECTION SUBCUTANEOUS DAILY
Refills: 0 | Status: DISCONTINUED | OUTPATIENT
Start: 2020-01-01 | End: 2020-01-01

## 2020-01-01 RX ORDER — INSULIN LISPRO 100/ML
VIAL (ML) SUBCUTANEOUS
Refills: 0 | Status: DISCONTINUED | OUTPATIENT
Start: 2020-01-01 | End: 2020-01-01

## 2020-01-01 RX ORDER — MORPHINE SULFATE 50 MG/1
0.5 CAPSULE, EXTENDED RELEASE ORAL EVERY 6 HOURS
Refills: 0 | Status: DISCONTINUED | OUTPATIENT
Start: 2020-01-01 | End: 2020-01-01

## 2020-01-01 RX ORDER — LISINOPRIL 2.5 MG/1
2.5 TABLET ORAL DAILY
Refills: 0 | Status: DISCONTINUED | OUTPATIENT
Start: 2020-01-01 | End: 2020-01-01

## 2020-01-01 RX ORDER — ASCORBIC ACID 60 MG
500 TABLET,CHEWABLE ORAL DAILY
Refills: 0 | Status: DISCONTINUED | OUTPATIENT
Start: 2020-01-01 | End: 2020-01-01

## 2020-01-01 RX ORDER — SODIUM CHLORIDE 9 MG/ML
1000 INJECTION, SOLUTION INTRAVENOUS
Refills: 0 | Status: DISCONTINUED | OUTPATIENT
Start: 2020-01-01 | End: 2020-01-01

## 2020-01-01 RX ORDER — ACETAMINOPHEN 500 MG
650 TABLET ORAL ONCE
Refills: 0 | Status: COMPLETED | OUTPATIENT
Start: 2020-01-01 | End: 2020-01-01

## 2020-01-01 RX ORDER — NOREPINEPHRINE BITARTRATE/D5W 8 MG/250ML
0.05 PLASTIC BAG, INJECTION (ML) INTRAVENOUS
Qty: 8 | Refills: 0 | Status: DISCONTINUED | OUTPATIENT
Start: 2020-01-01 | End: 2020-01-01

## 2020-01-01 RX ORDER — HALOPERIDOL DECANOATE 100 MG/ML
2.5 INJECTION INTRAMUSCULAR EVERY 6 HOURS
Refills: 0 | Status: DISCONTINUED | OUTPATIENT
Start: 2020-01-01 | End: 2020-01-01

## 2020-01-01 RX ORDER — MEROPENEM 1 G/30ML
1000 INJECTION INTRAVENOUS EVERY 8 HOURS
Refills: 0 | Status: DISCONTINUED | OUTPATIENT
Start: 2020-01-01 | End: 2020-01-01

## 2020-01-01 RX ORDER — DEXTROSE 50 % IN WATER 50 %
25 SYRINGE (ML) INTRAVENOUS ONCE
Refills: 0 | Status: DISCONTINUED | OUTPATIENT
Start: 2020-01-01 | End: 2020-01-01

## 2020-01-01 RX ORDER — MORPHINE SULFATE 50 MG/1
4 CAPSULE, EXTENDED RELEASE ORAL ONCE
Refills: 0 | Status: DISCONTINUED | OUTPATIENT
Start: 2020-01-01 | End: 2020-01-01

## 2020-01-01 RX ORDER — INSULIN DETEMIR 100/ML (3)
10 INSULIN PEN (ML) SUBCUTANEOUS AT BEDTIME
Refills: 0 | Status: DISCONTINUED | OUTPATIENT
Start: 2020-01-01 | End: 2020-01-01

## 2020-01-01 RX ORDER — DEXMEDETOMIDINE HYDROCHLORIDE IN 0.9% SODIUM CHLORIDE 4 UG/ML
0.5 INJECTION INTRAVENOUS
Qty: 200 | Refills: 0 | Status: DISCONTINUED | OUTPATIENT
Start: 2020-01-01 | End: 2020-01-01

## 2020-01-01 RX ORDER — ENOXAPARIN SODIUM 100 MG/ML
70 INJECTION SUBCUTANEOUS
Refills: 0 | Status: DISCONTINUED | OUTPATIENT
Start: 2020-01-01 | End: 2020-01-01

## 2020-01-01 RX ORDER — INSULIN DETEMIR 100/ML (3)
15 INSULIN PEN (ML) SUBCUTANEOUS AT BEDTIME
Refills: 0 | Status: DISCONTINUED | OUTPATIENT
Start: 2020-01-01 | End: 2020-01-01

## 2020-01-01 RX ORDER — ENOXAPARIN SODIUM 100 MG/ML
75 INJECTION SUBCUTANEOUS EVERY 12 HOURS
Refills: 0 | Status: DISCONTINUED | OUTPATIENT
Start: 2020-01-01 | End: 2020-01-01

## 2020-01-01 RX ORDER — DEXAMETHASONE 0.5 MG/5ML
6 ELIXIR ORAL DAILY
Refills: 0 | Status: DISCONTINUED | OUTPATIENT
Start: 2020-01-01 | End: 2020-01-01

## 2020-01-01 RX ORDER — SODIUM CHLORIDE 9 MG/ML
1000 INJECTION, SOLUTION INTRAVENOUS
Refills: 0 | Status: COMPLETED | OUTPATIENT
Start: 2020-01-01 | End: 2020-01-01

## 2020-01-01 RX ORDER — MEROPENEM 1 G/30ML
1000 INJECTION INTRAVENOUS ONCE
Refills: 0 | Status: COMPLETED | OUTPATIENT
Start: 2020-01-01 | End: 2020-01-01

## 2020-01-01 RX ORDER — SODIUM CHLORIDE 9 MG/ML
1000 INJECTION INTRAMUSCULAR; INTRAVENOUS; SUBCUTANEOUS
Refills: 0 | Status: DISCONTINUED | OUTPATIENT
Start: 2020-01-01 | End: 2020-01-01

## 2020-01-01 RX ORDER — REMDESIVIR 5 MG/ML
100 INJECTION INTRAVENOUS EVERY 24 HOURS
Refills: 0 | Status: COMPLETED | OUTPATIENT
Start: 2020-01-01 | End: 2020-01-01

## 2020-01-01 RX ORDER — PHENYLEPHRINE HYDROCHLORIDE 10 MG/ML
1 INJECTION INTRAVENOUS
Qty: 160 | Refills: 0 | Status: DISCONTINUED | OUTPATIENT
Start: 2020-01-01 | End: 2020-01-01

## 2020-01-01 RX ORDER — DEXTROSE 50 % IN WATER 50 %
15 SYRINGE (ML) INTRAVENOUS ONCE
Refills: 0 | Status: DISCONTINUED | OUTPATIENT
Start: 2020-01-01 | End: 2020-01-01

## 2020-01-01 RX ORDER — INSULIN DETEMIR 100/ML (3)
10 INSULIN PEN (ML) SUBCUTANEOUS ONCE
Refills: 0 | Status: COMPLETED | OUTPATIENT
Start: 2020-01-01 | End: 2020-01-01

## 2020-01-01 RX ORDER — ALBUTEROL 90 UG/1
2 AEROSOL, METERED ORAL EVERY 6 HOURS
Refills: 0 | Status: DISCONTINUED | OUTPATIENT
Start: 2020-01-01 | End: 2020-01-01

## 2020-01-01 RX ORDER — DIGOXIN 250 MCG
0.12 TABLET ORAL DAILY
Refills: 0 | Status: DISCONTINUED | OUTPATIENT
Start: 2020-01-01 | End: 2020-01-01

## 2020-01-01 RX ORDER — APIXABAN 2.5 MG/1
1 TABLET, FILM COATED ORAL
Qty: 0 | Refills: 0 | DISCHARGE

## 2020-01-01 RX ORDER — MORPHINE SULFATE 50 MG/1
2 CAPSULE, EXTENDED RELEASE ORAL ONCE
Refills: 0 | Status: DISCONTINUED | OUTPATIENT
Start: 2020-01-01 | End: 2020-01-01

## 2020-01-01 RX ORDER — DEXAMETHASONE 0.5 MG/5ML
6 ELIXIR ORAL ONCE
Refills: 0 | Status: COMPLETED | OUTPATIENT
Start: 2020-01-01 | End: 2020-01-01

## 2020-01-01 RX ORDER — REMDESIVIR 5 MG/ML
INJECTION INTRAVENOUS
Refills: 0 | Status: COMPLETED | OUTPATIENT
Start: 2020-01-01 | End: 2020-01-01

## 2020-01-01 RX ORDER — SODIUM CHLORIDE 9 MG/ML
500 INJECTION, SOLUTION INTRAVENOUS
Refills: 0 | Status: DISCONTINUED | OUTPATIENT
Start: 2020-01-01 | End: 2020-01-01

## 2020-01-01 RX ORDER — SODIUM CHLORIDE 0.65 %
1 AEROSOL, SPRAY (ML) NASAL EVERY 4 HOURS
Refills: 0 | Status: DISCONTINUED | OUTPATIENT
Start: 2020-01-01 | End: 2020-01-01

## 2020-01-01 RX ORDER — AMIODARONE HYDROCHLORIDE 400 MG/1
0.5 TABLET ORAL
Qty: 900 | Refills: 0 | Status: DISCONTINUED | OUTPATIENT
Start: 2020-01-01 | End: 2020-01-01

## 2020-01-01 RX ORDER — ENOXAPARIN SODIUM 100 MG/ML
80 INJECTION SUBCUTANEOUS EVERY 12 HOURS
Refills: 0 | Status: DISCONTINUED | OUTPATIENT
Start: 2020-01-01 | End: 2020-01-01

## 2020-01-01 RX ORDER — ONDANSETRON 8 MG/1
4 TABLET, FILM COATED ORAL ONCE
Refills: 0 | Status: COMPLETED | OUTPATIENT
Start: 2020-01-01 | End: 2020-01-01

## 2020-01-01 RX ORDER — GLUCAGON INJECTION, SOLUTION 0.5 MG/.1ML
1 INJECTION, SOLUTION SUBCUTANEOUS ONCE
Refills: 0 | Status: DISCONTINUED | OUTPATIENT
Start: 2020-01-01 | End: 2020-01-01

## 2020-01-01 RX ORDER — SODIUM CHLORIDE 9 MG/ML
1000 INJECTION INTRAMUSCULAR; INTRAVENOUS; SUBCUTANEOUS ONCE
Refills: 0 | Status: COMPLETED | OUTPATIENT
Start: 2020-01-01 | End: 2020-01-01

## 2020-01-01 RX ORDER — MEROPENEM 1 G/30ML
1 INJECTION INTRAVENOUS EVERY 12 HOURS
Refills: 0 | Status: DISCONTINUED | OUTPATIENT
Start: 2020-01-01 | End: 2020-01-01

## 2020-01-01 RX ORDER — INSULIN HUMAN 100 [IU]/ML
10 INJECTION, SOLUTION SUBCUTANEOUS ONCE
Refills: 0 | Status: COMPLETED | OUTPATIENT
Start: 2020-01-01 | End: 2020-01-01

## 2020-01-01 RX ORDER — INSULIN DETEMIR 100/ML (3)
5 INSULIN PEN (ML) SUBCUTANEOUS AT BEDTIME
Refills: 0 | Status: DISCONTINUED | OUTPATIENT
Start: 2020-01-01 | End: 2020-01-01

## 2020-01-01 RX ORDER — FUROSEMIDE 40 MG
40 TABLET ORAL ONCE
Refills: 0 | Status: COMPLETED | OUTPATIENT
Start: 2020-01-01 | End: 2020-01-01

## 2020-01-01 RX ORDER — SODIUM CHLORIDE 9 MG/ML
500 INJECTION INTRAMUSCULAR; INTRAVENOUS; SUBCUTANEOUS
Refills: 0 | Status: COMPLETED | OUTPATIENT
Start: 2020-01-01 | End: 2020-01-01

## 2020-01-01 RX ORDER — ASPIRIN/CALCIUM CARB/MAGNESIUM 324 MG
81 TABLET ORAL DAILY
Refills: 0 | Status: DISCONTINUED | OUTPATIENT
Start: 2020-01-01 | End: 2020-01-01

## 2020-01-01 RX ORDER — MEROPENEM 1 G/30ML
INJECTION INTRAVENOUS
Refills: 0 | Status: DISCONTINUED | OUTPATIENT
Start: 2020-01-01 | End: 2020-01-01

## 2020-01-01 RX ORDER — DEXMEDETOMIDINE HYDROCHLORIDE IN 0.9% SODIUM CHLORIDE 4 UG/ML
1.5 INJECTION INTRAVENOUS
Qty: 400 | Refills: 0 | Status: DISCONTINUED | OUTPATIENT
Start: 2020-01-01 | End: 2020-01-01

## 2020-01-01 RX ORDER — AMIODARONE HYDROCHLORIDE 400 MG/1
1 TABLET ORAL
Qty: 900 | Refills: 0 | Status: DISCONTINUED | OUTPATIENT
Start: 2020-01-01 | End: 2020-01-01

## 2020-01-01 RX ORDER — CHOLECALCIFEROL (VITAMIN D3) 125 MCG
1000 CAPSULE ORAL DAILY
Refills: 0 | Status: DISCONTINUED | OUTPATIENT
Start: 2020-01-01 | End: 2020-01-01

## 2020-01-01 RX ORDER — INSULIN LISPRO 100/ML
VIAL (ML) SUBCUTANEOUS AT BEDTIME
Refills: 0 | Status: DISCONTINUED | OUTPATIENT
Start: 2020-01-01 | End: 2020-01-01

## 2020-01-01 RX ORDER — DILTIAZEM HCL 120 MG
10 CAPSULE, EXT RELEASE 24 HR ORAL EVERY 8 HOURS
Refills: 0 | Status: DISCONTINUED | OUTPATIENT
Start: 2020-01-01 | End: 2020-01-01

## 2020-01-01 RX ORDER — METOPROLOL TARTRATE 50 MG
5 TABLET ORAL EVERY 12 HOURS
Refills: 0 | Status: DISCONTINUED | OUTPATIENT
Start: 2020-01-01 | End: 2020-01-01

## 2020-01-01 RX ORDER — DEXTROSE 50 % IN WATER 50 %
12.5 SYRINGE (ML) INTRAVENOUS ONCE
Refills: 0 | Status: DISCONTINUED | OUTPATIENT
Start: 2020-01-01 | End: 2020-01-01

## 2020-01-01 RX ORDER — DILTIAZEM HCL 120 MG
10 CAPSULE, EXT RELEASE 24 HR ORAL ONCE
Refills: 0 | Status: COMPLETED | OUTPATIENT
Start: 2020-01-01 | End: 2020-01-01

## 2020-01-01 RX ORDER — APIXABAN 2.5 MG/1
5 TABLET, FILM COATED ORAL EVERY 12 HOURS
Refills: 0 | Status: DISCONTINUED | OUTPATIENT
Start: 2020-01-01 | End: 2020-01-01

## 2020-01-01 RX ORDER — SODIUM ZIRCONIUM CYCLOSILICATE 10 G/10G
10 POWDER, FOR SUSPENSION ORAL ONCE
Refills: 0 | Status: COMPLETED | OUTPATIENT
Start: 2020-01-01 | End: 2020-01-01

## 2020-01-01 RX ORDER — ATORVASTATIN CALCIUM 80 MG/1
40 TABLET, FILM COATED ORAL AT BEDTIME
Refills: 0 | Status: DISCONTINUED | OUTPATIENT
Start: 2020-01-01 | End: 2020-01-01

## 2020-01-01 RX ORDER — INSULIN DETEMIR 100/ML (3)
10 INSULIN PEN (ML) SUBCUTANEOUS
Qty: 0 | Refills: 0 | DISCHARGE

## 2020-01-01 RX ORDER — ACETAMINOPHEN 500 MG
650 TABLET ORAL EVERY 6 HOURS
Refills: 0 | Status: DISCONTINUED | OUTPATIENT
Start: 2020-01-01 | End: 2020-01-01

## 2020-01-01 RX ORDER — MULTIVIT-MIN/FERROUS GLUCONATE 9 MG/15 ML
1 LIQUID (ML) ORAL DAILY
Refills: 0 | Status: DISCONTINUED | OUTPATIENT
Start: 2020-01-01 | End: 2020-01-01

## 2020-01-01 RX ORDER — PHENYLEPHRINE HYDROCHLORIDE 10 MG/ML
1 INJECTION INTRAVENOUS
Qty: 40 | Refills: 0 | Status: DISCONTINUED | OUTPATIENT
Start: 2020-01-01 | End: 2020-01-01

## 2020-01-01 RX ORDER — MORPHINE SULFATE 50 MG/1
6 CAPSULE, EXTENDED RELEASE ORAL
Qty: 100 | Refills: 0 | Status: DISCONTINUED | OUTPATIENT
Start: 2020-01-01 | End: 2020-01-01

## 2020-01-01 RX ORDER — ONDANSETRON 8 MG/1
4 TABLET, FILM COATED ORAL EVERY 6 HOURS
Refills: 0 | Status: DISCONTINUED | OUTPATIENT
Start: 2020-01-01 | End: 2020-01-01

## 2020-01-01 RX ORDER — REMDESIVIR 5 MG/ML
200 INJECTION INTRAVENOUS EVERY 24 HOURS
Refills: 0 | Status: COMPLETED | OUTPATIENT
Start: 2020-01-01 | End: 2020-01-01

## 2020-01-01 RX ORDER — ALBUTEROL 90 UG/1
2 AEROSOL, METERED ORAL EVERY 4 HOURS
Refills: 0 | Status: DISCONTINUED | OUTPATIENT
Start: 2020-01-01 | End: 2020-01-01

## 2020-01-01 RX ORDER — METOPROLOL TARTRATE 50 MG
25 TABLET ORAL DAILY
Refills: 0 | Status: DISCONTINUED | OUTPATIENT
Start: 2020-01-01 | End: 2020-01-01

## 2020-01-01 RX ORDER — MORPHINE SULFATE 50 MG/1
2 CAPSULE, EXTENDED RELEASE ORAL EVERY 4 HOURS
Refills: 0 | Status: DISCONTINUED | OUTPATIENT
Start: 2020-01-01 | End: 2020-01-01

## 2020-01-01 RX ADMIN — Medication 81 MILLIGRAM(S): at 12:00

## 2020-01-01 RX ADMIN — Medication 6 MILLIGRAM(S): at 05:28

## 2020-01-01 RX ADMIN — Medication 2: at 06:06

## 2020-01-01 RX ADMIN — Medication 25 MILLIGRAM(S): at 05:39

## 2020-01-01 RX ADMIN — ATORVASTATIN CALCIUM 40 MILLIGRAM(S): 80 TABLET, FILM COATED ORAL at 21:18

## 2020-01-01 RX ADMIN — Medication 10 UNIT(S): at 22:20

## 2020-01-01 RX ADMIN — ENOXAPARIN SODIUM 75 MILLIGRAM(S): 100 INJECTION SUBCUTANEOUS at 17:13

## 2020-01-01 RX ADMIN — LISINOPRIL 2.5 MILLIGRAM(S): 2.5 TABLET ORAL at 05:17

## 2020-01-01 RX ADMIN — APIXABAN 5 MILLIGRAM(S): 2.5 TABLET, FILM COATED ORAL at 16:42

## 2020-01-01 RX ADMIN — APIXABAN 5 MILLIGRAM(S): 2.5 TABLET, FILM COATED ORAL at 05:17

## 2020-01-01 RX ADMIN — APIXABAN 5 MILLIGRAM(S): 2.5 TABLET, FILM COATED ORAL at 05:37

## 2020-01-01 RX ADMIN — Medication 2: at 12:10

## 2020-01-01 RX ADMIN — ALBUTEROL 2 PUFF(S): 90 AEROSOL, METERED ORAL at 08:05

## 2020-01-01 RX ADMIN — Medication 6 MILLIGRAM(S): at 20:22

## 2020-01-01 RX ADMIN — Medication 500 MILLIGRAM(S): at 12:10

## 2020-01-01 RX ADMIN — Medication 2: at 11:32

## 2020-01-01 RX ADMIN — Medication 1 TABLET(S): at 12:51

## 2020-01-01 RX ADMIN — MEROPENEM 100 MILLIGRAM(S): 1 INJECTION INTRAVENOUS at 12:09

## 2020-01-01 RX ADMIN — Medication 81 MILLIGRAM(S): at 12:11

## 2020-01-01 RX ADMIN — Medication 2: at 12:20

## 2020-01-01 RX ADMIN — APIXABAN 5 MILLIGRAM(S): 2.5 TABLET, FILM COATED ORAL at 05:31

## 2020-01-01 RX ADMIN — PHENYLEPHRINE HYDROCHLORIDE 27.6 MICROGRAM(S)/KG/MIN: 10 INJECTION INTRAVENOUS at 17:14

## 2020-01-01 RX ADMIN — Medication 1 TABLET(S): at 12:00

## 2020-01-01 RX ADMIN — MEROPENEM 100 MILLIGRAM(S): 1 INJECTION INTRAVENOUS at 13:00

## 2020-01-01 RX ADMIN — Medication 6 MILLIGRAM(S): at 06:07

## 2020-01-01 RX ADMIN — Medication 0.12 MILLIGRAM(S): at 05:25

## 2020-01-01 RX ADMIN — APIXABAN 5 MILLIGRAM(S): 2.5 TABLET, FILM COATED ORAL at 06:07

## 2020-01-01 RX ADMIN — Medication 6 MILLIGRAM(S): at 05:07

## 2020-01-01 RX ADMIN — Medication 650 MILLIGRAM(S): at 00:38

## 2020-01-01 RX ADMIN — Medication 6 MILLIGRAM(S): at 12:50

## 2020-01-01 RX ADMIN — ALBUTEROL 2 PUFF(S): 90 AEROSOL, METERED ORAL at 18:27

## 2020-01-01 RX ADMIN — Medication 10 UNIT(S): at 21:36

## 2020-01-01 RX ADMIN — Medication 110 MILLIGRAM(S): at 18:13

## 2020-01-01 RX ADMIN — Medication 0.12 MILLIGRAM(S): at 08:41

## 2020-01-01 RX ADMIN — Medication 25 MILLIGRAM(S): at 05:25

## 2020-01-01 RX ADMIN — ALBUTEROL 2 PUFF(S): 90 AEROSOL, METERED ORAL at 21:08

## 2020-01-01 RX ADMIN — MORPHINE SULFATE 2 MILLIGRAM(S): 50 CAPSULE, EXTENDED RELEASE ORAL at 15:20

## 2020-01-01 RX ADMIN — ATORVASTATIN CALCIUM 40 MILLIGRAM(S): 80 TABLET, FILM COATED ORAL at 21:36

## 2020-01-01 RX ADMIN — Medication 0.12 MILLIGRAM(S): at 05:11

## 2020-01-01 RX ADMIN — SODIUM CHLORIDE 75 MILLILITER(S): 9 INJECTION, SOLUTION INTRAVENOUS at 16:10

## 2020-01-01 RX ADMIN — REMDESIVIR 500 MILLIGRAM(S): 5 INJECTION INTRAVENOUS at 14:57

## 2020-01-01 RX ADMIN — Medication 6.89 MICROGRAM(S)/KG/MIN: at 06:49

## 2020-01-01 RX ADMIN — Medication 2: at 16:45

## 2020-01-01 RX ADMIN — Medication 500 MILLIGRAM(S): at 13:05

## 2020-01-01 RX ADMIN — MORPHINE SULFATE 2 MILLIGRAM(S): 50 CAPSULE, EXTENDED RELEASE ORAL at 15:06

## 2020-01-01 RX ADMIN — Medication 81 MILLIGRAM(S): at 12:22

## 2020-01-01 RX ADMIN — ALBUTEROL 2 PUFF(S): 90 AEROSOL, METERED ORAL at 04:41

## 2020-01-01 RX ADMIN — Medication 6 MILLIGRAM(S): at 05:31

## 2020-01-01 RX ADMIN — ATORVASTATIN CALCIUM 40 MILLIGRAM(S): 80 TABLET, FILM COATED ORAL at 21:57

## 2020-01-01 RX ADMIN — Medication 110 MILLIGRAM(S): at 06:07

## 2020-01-01 RX ADMIN — APIXABAN 5 MILLIGRAM(S): 2.5 TABLET, FILM COATED ORAL at 05:08

## 2020-01-01 RX ADMIN — ENOXAPARIN SODIUM 75 MILLIGRAM(S): 100 INJECTION SUBCUTANEOUS at 05:14

## 2020-01-01 RX ADMIN — Medication 6 MILLIGRAM(S): at 05:59

## 2020-01-01 RX ADMIN — AMIODARONE HYDROCHLORIDE 16.7 MG/MIN: 400 TABLET ORAL at 11:00

## 2020-01-01 RX ADMIN — MEROPENEM 100 MILLIGRAM(S): 1 INJECTION INTRAVENOUS at 21:06

## 2020-01-01 RX ADMIN — APIXABAN 5 MILLIGRAM(S): 2.5 TABLET, FILM COATED ORAL at 21:33

## 2020-01-01 RX ADMIN — Medication 600 MILLIGRAM(S): at 05:25

## 2020-01-01 RX ADMIN — LISINOPRIL 2.5 MILLIGRAM(S): 2.5 TABLET ORAL at 05:30

## 2020-01-01 RX ADMIN — Medication 1 TABLET(S): at 11:39

## 2020-01-01 RX ADMIN — Medication 0.12 MILLIGRAM(S): at 05:08

## 2020-01-01 RX ADMIN — Medication 6 MILLIGRAM(S): at 05:11

## 2020-01-01 RX ADMIN — MORPHINE SULFATE 2 MILLIGRAM(S): 50 CAPSULE, EXTENDED RELEASE ORAL at 09:46

## 2020-01-01 RX ADMIN — Medication 0.12 MILLIGRAM(S): at 06:13

## 2020-01-01 RX ADMIN — ALBUTEROL 2 PUFF(S): 90 AEROSOL, METERED ORAL at 21:57

## 2020-01-01 RX ADMIN — Medication 10 UNIT(S): at 23:00

## 2020-01-01 RX ADMIN — Medication 10 UNIT(S): at 21:16

## 2020-01-01 RX ADMIN — APIXABAN 5 MILLIGRAM(S): 2.5 TABLET, FILM COATED ORAL at 05:59

## 2020-01-01 RX ADMIN — MEROPENEM 100 MILLIGRAM(S): 1 INJECTION INTRAVENOUS at 05:03

## 2020-01-01 RX ADMIN — MORPHINE SULFATE 2 MILLIGRAM(S): 50 CAPSULE, EXTENDED RELEASE ORAL at 01:38

## 2020-01-01 RX ADMIN — Medication 1 TABLET(S): at 14:13

## 2020-01-01 RX ADMIN — Medication 650 MILLIGRAM(S): at 10:05

## 2020-01-01 RX ADMIN — SODIUM CHLORIDE 1000 MILLILITER(S): 9 INJECTION INTRAMUSCULAR; INTRAVENOUS; SUBCUTANEOUS at 00:51

## 2020-01-01 RX ADMIN — Medication 25 MILLIGRAM(S): at 05:59

## 2020-01-01 RX ADMIN — Medication 500 MILLIGRAM(S): at 12:00

## 2020-01-01 RX ADMIN — LISINOPRIL 2.5 MILLIGRAM(S): 2.5 TABLET ORAL at 06:06

## 2020-01-01 RX ADMIN — Medication 0.5 MILLIGRAM(S): at 15:16

## 2020-01-01 RX ADMIN — MORPHINE SULFATE 2 MILLIGRAM(S): 50 CAPSULE, EXTENDED RELEASE ORAL at 06:14

## 2020-01-01 RX ADMIN — Medication 2: at 08:10

## 2020-01-01 RX ADMIN — SODIUM CHLORIDE 75 MILLILITER(S): 9 INJECTION INTRAMUSCULAR; INTRAVENOUS; SUBCUTANEOUS at 22:18

## 2020-01-01 RX ADMIN — ALBUTEROL 2 PUFF(S): 90 AEROSOL, METERED ORAL at 21:37

## 2020-01-01 RX ADMIN — APIXABAN 5 MILLIGRAM(S): 2.5 TABLET, FILM COATED ORAL at 17:02

## 2020-01-01 RX ADMIN — APIXABAN 5 MILLIGRAM(S): 2.5 TABLET, FILM COATED ORAL at 17:40

## 2020-01-01 RX ADMIN — MEROPENEM 100 MILLIGRAM(S): 1 INJECTION INTRAVENOUS at 05:12

## 2020-01-01 RX ADMIN — Medication 2: at 12:56

## 2020-01-01 RX ADMIN — Medication 25 MILLIGRAM(S): at 05:07

## 2020-01-01 RX ADMIN — Medication 600 MILLIGRAM(S): at 17:50

## 2020-01-01 RX ADMIN — AMIODARONE HYDROCHLORIDE 33.3 MG/MIN: 400 TABLET ORAL at 17:00

## 2020-01-01 RX ADMIN — Medication 110 MILLIGRAM(S): at 05:13

## 2020-01-01 RX ADMIN — Medication 6 MILLIGRAM(S): at 05:32

## 2020-01-01 RX ADMIN — LISINOPRIL 2.5 MILLIGRAM(S): 2.5 TABLET ORAL at 05:11

## 2020-01-01 RX ADMIN — Medication 4: at 12:18

## 2020-01-01 RX ADMIN — MEROPENEM 100 MILLIGRAM(S): 1 INJECTION INTRAVENOUS at 22:00

## 2020-01-01 RX ADMIN — ATORVASTATIN CALCIUM 40 MILLIGRAM(S): 80 TABLET, FILM COATED ORAL at 21:49

## 2020-01-01 RX ADMIN — REMDESIVIR 500 MILLIGRAM(S): 5 INJECTION INTRAVENOUS at 12:14

## 2020-01-01 RX ADMIN — Medication 1 TABLET(S): at 12:22

## 2020-01-01 RX ADMIN — INSULIN HUMAN 10 UNIT(S): 100 INJECTION, SOLUTION SUBCUTANEOUS at 23:06

## 2020-01-01 RX ADMIN — ATORVASTATIN CALCIUM 40 MILLIGRAM(S): 80 TABLET, FILM COATED ORAL at 21:16

## 2020-01-01 RX ADMIN — Medication 10 MILLIGRAM(S): at 07:40

## 2020-01-01 RX ADMIN — APIXABAN 5 MILLIGRAM(S): 2.5 TABLET, FILM COATED ORAL at 17:21

## 2020-01-01 RX ADMIN — ALBUTEROL 2 PUFF(S): 90 AEROSOL, METERED ORAL at 21:56

## 2020-01-01 RX ADMIN — APIXABAN 5 MILLIGRAM(S): 2.5 TABLET, FILM COATED ORAL at 17:42

## 2020-01-01 RX ADMIN — REMDESIVIR 500 MILLIGRAM(S): 5 INJECTION INTRAVENOUS at 13:37

## 2020-01-01 RX ADMIN — ONDANSETRON 4 MILLIGRAM(S): 8 TABLET, FILM COATED ORAL at 20:23

## 2020-01-01 RX ADMIN — SODIUM CHLORIDE 100 MILLILITER(S): 9 INJECTION, SOLUTION INTRAVENOUS at 19:45

## 2020-01-01 RX ADMIN — ATORVASTATIN CALCIUM 40 MILLIGRAM(S): 80 TABLET, FILM COATED ORAL at 22:11

## 2020-01-01 RX ADMIN — Medication 81 MILLIGRAM(S): at 16:23

## 2020-01-01 RX ADMIN — Medication 1000 UNIT(S): at 12:01

## 2020-01-01 RX ADMIN — Medication 10 UNIT(S): at 22:44

## 2020-01-01 RX ADMIN — SODIUM CHLORIDE 100 MILLILITER(S): 9 INJECTION, SOLUTION INTRAVENOUS at 05:09

## 2020-01-01 RX ADMIN — Medication 0.12 MILLIGRAM(S): at 05:28

## 2020-01-01 RX ADMIN — Medication 0.12 MILLIGRAM(S): at 05:17

## 2020-01-01 RX ADMIN — Medication 4: at 16:39

## 2020-01-01 RX ADMIN — REMDESIVIR 500 MILLIGRAM(S): 5 INJECTION INTRAVENOUS at 14:13

## 2020-01-01 RX ADMIN — DEXMEDETOMIDINE HYDROCHLORIDE IN 0.9% SODIUM CHLORIDE 9.19 MICROGRAM(S)/KG/HR: 4 INJECTION INTRAVENOUS at 11:00

## 2020-01-01 RX ADMIN — REMDESIVIR 500 MILLIGRAM(S): 5 INJECTION INTRAVENOUS at 13:45

## 2020-01-01 RX ADMIN — ENOXAPARIN SODIUM 75 MILLIGRAM(S): 100 INJECTION SUBCUTANEOUS at 18:13

## 2020-01-01 RX ADMIN — Medication 500 MILLIGRAM(S): at 12:18

## 2020-01-01 RX ADMIN — Medication 6 MILLIGRAM(S): at 05:18

## 2020-01-01 RX ADMIN — Medication 0.12 MILLIGRAM(S): at 05:33

## 2020-01-01 RX ADMIN — Medication 500 MILLIGRAM(S): at 11:34

## 2020-01-01 RX ADMIN — Medication 10 UNIT(S): at 08:44

## 2020-01-01 RX ADMIN — Medication 110 MILLIGRAM(S): at 17:13

## 2020-01-01 RX ADMIN — Medication 0.12 MILLIGRAM(S): at 06:07

## 2020-01-01 RX ADMIN — Medication 2: at 18:41

## 2020-01-01 RX ADMIN — Medication 2: at 12:48

## 2020-01-01 RX ADMIN — ALBUTEROL 2 PUFF(S): 90 AEROSOL, METERED ORAL at 05:29

## 2020-01-01 RX ADMIN — PHENYLEPHRINE HYDROCHLORIDE 27.6 MICROGRAM(S)/KG/MIN: 10 INJECTION INTRAVENOUS at 19:45

## 2020-01-01 RX ADMIN — APIXABAN 5 MILLIGRAM(S): 2.5 TABLET, FILM COATED ORAL at 17:23

## 2020-01-01 RX ADMIN — ENOXAPARIN SODIUM 75 MILLIGRAM(S): 100 INJECTION SUBCUTANEOUS at 15:40

## 2020-01-01 RX ADMIN — LISINOPRIL 2.5 MILLIGRAM(S): 2.5 TABLET ORAL at 08:41

## 2020-01-01 RX ADMIN — Medication 1 TABLET(S): at 13:04

## 2020-01-01 RX ADMIN — ENOXAPARIN SODIUM 75 MILLIGRAM(S): 100 INJECTION SUBCUTANEOUS at 05:48

## 2020-01-01 RX ADMIN — SODIUM CHLORIDE 100 MILLILITER(S): 9 INJECTION, SOLUTION INTRAVENOUS at 11:00

## 2020-01-01 RX ADMIN — Medication 0.5 MILLIGRAM(S): at 16:28

## 2020-01-01 RX ADMIN — DEXMEDETOMIDINE HYDROCHLORIDE IN 0.9% SODIUM CHLORIDE 9.19 MICROGRAM(S)/KG/HR: 4 INJECTION INTRAVENOUS at 06:49

## 2020-01-01 RX ADMIN — Medication 81 MILLIGRAM(S): at 13:04

## 2020-01-01 RX ADMIN — DEXMEDETOMIDINE HYDROCHLORIDE IN 0.9% SODIUM CHLORIDE 9.19 MICROGRAM(S)/KG/HR: 4 INJECTION INTRAVENOUS at 17:14

## 2020-01-01 RX ADMIN — Medication 1 TABLET(S): at 12:26

## 2020-01-01 RX ADMIN — SODIUM ZIRCONIUM CYCLOSILICATE 10 GRAM(S): 10 POWDER, FOR SUSPENSION ORAL at 21:36

## 2020-01-01 RX ADMIN — ENOXAPARIN SODIUM 75 MILLIGRAM(S): 100 INJECTION SUBCUTANEOUS at 17:15

## 2020-01-01 RX ADMIN — MEROPENEM 100 MILLIGRAM(S): 1 INJECTION INTRAVENOUS at 13:06

## 2020-01-01 RX ADMIN — REMDESIVIR 500 MILLIGRAM(S): 5 INJECTION INTRAVENOUS at 17:23

## 2020-01-01 RX ADMIN — Medication 1 TABLET(S): at 12:18

## 2020-01-01 RX ADMIN — ALBUTEROL 2 PUFF(S): 90 AEROSOL, METERED ORAL at 17:16

## 2020-01-01 RX ADMIN — APIXABAN 5 MILLIGRAM(S): 2.5 TABLET, FILM COATED ORAL at 08:41

## 2020-01-01 RX ADMIN — Medication 2: at 12:15

## 2020-01-01 RX ADMIN — Medication 81 MILLIGRAM(S): at 12:18

## 2020-01-01 RX ADMIN — Medication 25 MILLIGRAM(S): at 06:06

## 2020-01-01 RX ADMIN — Medication 1000 UNIT(S): at 11:17

## 2020-01-01 RX ADMIN — ALBUTEROL 2 PUFF(S): 90 AEROSOL, METERED ORAL at 14:16

## 2020-01-01 RX ADMIN — Medication 500 MILLIGRAM(S): at 12:11

## 2020-01-01 RX ADMIN — Medication 600 MILLIGRAM(S): at 05:17

## 2020-01-01 RX ADMIN — Medication 25 MILLIGRAM(S): at 05:38

## 2020-01-01 RX ADMIN — Medication 0.5 MILLIGRAM(S): at 09:09

## 2020-01-01 RX ADMIN — Medication 10 MILLIGRAM(S): at 09:43

## 2020-01-01 RX ADMIN — ALBUTEROL 2 PUFF(S): 90 AEROSOL, METERED ORAL at 03:22

## 2020-01-01 RX ADMIN — Medication 500 MILLIGRAM(S): at 14:13

## 2020-01-01 RX ADMIN — Medication 81 MILLIGRAM(S): at 11:40

## 2020-01-01 RX ADMIN — Medication 0.12 MILLIGRAM(S): at 05:07

## 2020-01-01 RX ADMIN — ALBUTEROL 2 PUFF(S): 90 AEROSOL, METERED ORAL at 08:47

## 2020-01-01 RX ADMIN — PHENYLEPHRINE HYDROCHLORIDE 27.6 MICROGRAM(S)/KG/MIN: 10 INJECTION INTRAVENOUS at 15:02

## 2020-01-01 RX ADMIN — Medication 40 MILLIGRAM(S): at 21:34

## 2020-01-01 RX ADMIN — ALBUTEROL 2 PUFF(S): 90 AEROSOL, METERED ORAL at 16:28

## 2020-01-01 RX ADMIN — SODIUM CHLORIDE 100 MILLILITER(S): 9 INJECTION, SOLUTION INTRAVENOUS at 12:47

## 2020-01-01 RX ADMIN — MEROPENEM 100 MILLIGRAM(S): 1 INJECTION INTRAVENOUS at 14:29

## 2020-01-01 RX ADMIN — Medication 4: at 12:12

## 2020-01-01 RX ADMIN — Medication 81 MILLIGRAM(S): at 12:51

## 2020-01-01 RX ADMIN — APIXABAN 5 MILLIGRAM(S): 2.5 TABLET, FILM COATED ORAL at 16:54

## 2020-01-01 RX ADMIN — ALBUTEROL 2 PUFF(S): 90 AEROSOL, METERED ORAL at 20:00

## 2020-01-01 RX ADMIN — HALOPERIDOL DECANOATE 2.5 MILLIGRAM(S): 100 INJECTION INTRAMUSCULAR at 15:16

## 2020-01-01 RX ADMIN — Medication 81 MILLIGRAM(S): at 11:16

## 2020-01-01 RX ADMIN — ALBUTEROL 2 PUFF(S): 90 AEROSOL, METERED ORAL at 02:00

## 2020-01-01 RX ADMIN — Medication 500 MILLIGRAM(S): at 11:39

## 2020-01-01 RX ADMIN — Medication 81 MILLIGRAM(S): at 11:17

## 2020-01-01 RX ADMIN — SODIUM CHLORIDE 100 MILLILITER(S): 9 INJECTION INTRAMUSCULAR; INTRAVENOUS; SUBCUTANEOUS at 03:47

## 2020-01-01 RX ADMIN — MEROPENEM 100 MILLIGRAM(S): 1 INJECTION INTRAVENOUS at 13:05

## 2020-01-01 RX ADMIN — Medication 81 MILLIGRAM(S): at 11:35

## 2020-01-01 RX ADMIN — ALBUTEROL 2 PUFF(S): 90 AEROSOL, METERED ORAL at 04:58

## 2020-01-01 RX ADMIN — Medication 81 MILLIGRAM(S): at 14:13

## 2020-01-01 RX ADMIN — Medication 600 MILLIGRAM(S): at 18:26

## 2020-01-01 RX ADMIN — Medication 15 UNIT(S): at 21:57

## 2020-01-01 RX ADMIN — Medication 25 MILLIGRAM(S): at 06:13

## 2020-01-01 RX ADMIN — APIXABAN 5 MILLIGRAM(S): 2.5 TABLET, FILM COATED ORAL at 05:16

## 2020-01-01 RX ADMIN — Medication 1 TABLET(S): at 11:17

## 2020-01-01 RX ADMIN — MORPHINE SULFATE 2 MILLIGRAM(S): 50 CAPSULE, EXTENDED RELEASE ORAL at 10:25

## 2020-01-01 RX ADMIN — ENOXAPARIN SODIUM 75 MILLIGRAM(S): 100 INJECTION SUBCUTANEOUS at 05:03

## 2020-01-01 RX ADMIN — Medication 5 MILLIGRAM(S): at 13:00

## 2020-01-01 RX ADMIN — MEROPENEM 100 MILLIGRAM(S): 1 INJECTION INTRAVENOUS at 21:07

## 2020-01-01 RX ADMIN — APIXABAN 5 MILLIGRAM(S): 2.5 TABLET, FILM COATED ORAL at 17:19

## 2020-01-01 RX ADMIN — Medication 10 MILLIGRAM(S): at 18:25

## 2020-01-01 RX ADMIN — Medication 2: at 17:14

## 2020-01-01 RX ADMIN — APIXABAN 5 MILLIGRAM(S): 2.5 TABLET, FILM COATED ORAL at 16:44

## 2020-01-01 RX ADMIN — MEROPENEM 100 MILLIGRAM(S): 1 INJECTION INTRAVENOUS at 05:35

## 2020-01-01 RX ADMIN — Medication 2: at 08:56

## 2020-01-01 RX ADMIN — Medication 600 MILLIGRAM(S): at 05:34

## 2020-01-01 RX ADMIN — Medication 10 UNIT(S): at 22:05

## 2020-01-01 RX ADMIN — Medication 110 MILLIGRAM(S): at 06:12

## 2020-01-01 RX ADMIN — LISINOPRIL 2.5 MILLIGRAM(S): 2.5 TABLET ORAL at 06:07

## 2020-01-01 RX ADMIN — Medication 500 MILLIGRAM(S): at 12:22

## 2020-01-01 RX ADMIN — Medication 25 MILLIGRAM(S): at 08:41

## 2020-01-01 RX ADMIN — Medication 25 MILLIGRAM(S): at 05:08

## 2020-01-01 RX ADMIN — Medication 1: at 07:12

## 2020-01-01 RX ADMIN — Medication 2: at 12:47

## 2020-01-01 RX ADMIN — Medication 600 MILLIGRAM(S): at 21:36

## 2020-01-01 RX ADMIN — Medication 81 MILLIGRAM(S): at 12:44

## 2020-01-01 RX ADMIN — LISINOPRIL 2.5 MILLIGRAM(S): 2.5 TABLET ORAL at 05:28

## 2020-01-01 RX ADMIN — Medication 1 TABLET(S): at 12:10

## 2020-01-01 RX ADMIN — Medication 600 MILLIGRAM(S): at 17:02

## 2020-01-01 RX ADMIN — ATORVASTATIN CALCIUM 40 MILLIGRAM(S): 80 TABLET, FILM COATED ORAL at 21:08

## 2020-01-01 RX ADMIN — MEROPENEM 100 MILLIGRAM(S): 1 INJECTION INTRAVENOUS at 05:08

## 2020-01-01 RX ADMIN — ATORVASTATIN CALCIUM 40 MILLIGRAM(S): 80 TABLET, FILM COATED ORAL at 23:07

## 2020-01-01 RX ADMIN — APIXABAN 5 MILLIGRAM(S): 2.5 TABLET, FILM COATED ORAL at 05:11

## 2020-01-01 RX ADMIN — Medication 2: at 12:23

## 2020-01-01 RX ADMIN — LISINOPRIL 2.5 MILLIGRAM(S): 2.5 TABLET ORAL at 05:34

## 2020-01-01 RX ADMIN — ALBUTEROL 2 PUFF(S): 90 AEROSOL, METERED ORAL at 12:07

## 2020-01-01 RX ADMIN — ATORVASTATIN CALCIUM 40 MILLIGRAM(S): 80 TABLET, FILM COATED ORAL at 22:21

## 2020-01-01 RX ADMIN — Medication 600 MILLIGRAM(S): at 06:13

## 2020-01-01 RX ADMIN — LISINOPRIL 2.5 MILLIGRAM(S): 2.5 TABLET ORAL at 05:32

## 2020-01-01 RX ADMIN — Medication 0.12 MILLIGRAM(S): at 05:59

## 2020-01-01 RX ADMIN — Medication 600 MILLIGRAM(S): at 05:37

## 2020-01-01 RX ADMIN — MEROPENEM 100 MILLIGRAM(S): 1 INJECTION INTRAVENOUS at 21:49

## 2020-01-01 RX ADMIN — Medication 6 MILLIGRAM(S): at 05:37

## 2020-01-01 RX ADMIN — Medication 10 UNIT(S): at 21:33

## 2020-01-01 RX ADMIN — SODIUM CHLORIDE 100 MILLILITER(S): 9 INJECTION, SOLUTION INTRAVENOUS at 17:50

## 2020-01-01 RX ADMIN — Medication 2: at 16:44

## 2020-01-01 RX ADMIN — Medication 10 UNIT(S): at 21:50

## 2020-01-01 RX ADMIN — Medication 2: at 08:45

## 2020-01-01 RX ADMIN — Medication 600 MILLIGRAM(S): at 17:23

## 2020-01-01 RX ADMIN — LISINOPRIL 2.5 MILLIGRAM(S): 2.5 TABLET ORAL at 05:07

## 2020-01-01 RX ADMIN — Medication 110 MILLIGRAM(S): at 15:40

## 2020-01-01 RX ADMIN — MEROPENEM 100 MILLIGRAM(S): 1 INJECTION INTRAVENOUS at 15:16

## 2020-01-01 RX ADMIN — Medication 110 MILLIGRAM(S): at 05:35

## 2020-01-01 RX ADMIN — MORPHINE SULFATE 2 MILLIGRAM(S): 50 CAPSULE, EXTENDED RELEASE ORAL at 10:24

## 2020-01-01 RX ADMIN — Medication 500 MILLIGRAM(S): at 12:26

## 2020-01-01 RX ADMIN — ALBUTEROL 2 PUFF(S): 90 AEROSOL, METERED ORAL at 08:25

## 2020-01-01 RX ADMIN — APIXABAN 5 MILLIGRAM(S): 2.5 TABLET, FILM COATED ORAL at 05:07

## 2020-01-01 RX ADMIN — Medication 10 UNIT(S): at 22:09

## 2020-01-01 RX ADMIN — APIXABAN 5 MILLIGRAM(S): 2.5 TABLET, FILM COATED ORAL at 17:12

## 2020-01-01 RX ADMIN — Medication 4: at 17:03

## 2020-01-01 RX ADMIN — MORPHINE SULFATE 2 MILLIGRAM(S): 50 CAPSULE, EXTENDED RELEASE ORAL at 05:50

## 2020-01-01 RX ADMIN — REMDESIVIR 500 MILLIGRAM(S): 5 INJECTION INTRAVENOUS at 12:55

## 2020-01-01 RX ADMIN — APIXABAN 5 MILLIGRAM(S): 2.5 TABLET, FILM COATED ORAL at 05:32

## 2020-01-01 RX ADMIN — APIXABAN 5 MILLIGRAM(S): 2.5 TABLET, FILM COATED ORAL at 17:00

## 2020-01-01 RX ADMIN — MEROPENEM 100 MILLIGRAM(S): 1 INJECTION INTRAVENOUS at 06:13

## 2020-01-01 RX ADMIN — Medication 600 MILLIGRAM(S): at 05:08

## 2020-01-01 RX ADMIN — Medication 6 MILLIGRAM(S): at 08:42

## 2020-01-01 RX ADMIN — Medication 110 MILLIGRAM(S): at 17:59

## 2020-01-01 RX ADMIN — Medication 110 MILLIGRAM(S): at 17:17

## 2020-01-01 RX ADMIN — Medication 25 MILLIGRAM(S): at 05:30

## 2020-01-01 RX ADMIN — MORPHINE SULFATE 2 MILLIGRAM(S): 50 CAPSULE, EXTENDED RELEASE ORAL at 05:12

## 2020-01-01 RX ADMIN — REMDESIVIR 500 MILLIGRAM(S): 5 INJECTION INTRAVENOUS at 13:22

## 2020-01-01 RX ADMIN — SODIUM CHLORIDE 75 MILLILITER(S): 9 INJECTION INTRAMUSCULAR; INTRAVENOUS; SUBCUTANEOUS at 08:46

## 2020-01-01 RX ADMIN — Medication 25 MILLIGRAM(S): at 05:16

## 2020-01-01 RX ADMIN — ALBUTEROL 2 PUFF(S): 90 AEROSOL, METERED ORAL at 14:15

## 2020-01-01 RX ADMIN — MEROPENEM 100 MILLIGRAM(S): 1 INJECTION INTRAVENOUS at 05:48

## 2020-01-01 RX ADMIN — Medication 110 MILLIGRAM(S): at 17:51

## 2020-01-01 RX ADMIN — Medication 25 MILLIGRAM(S): at 05:17

## 2020-01-01 RX ADMIN — ATORVASTATIN CALCIUM 40 MILLIGRAM(S): 80 TABLET, FILM COATED ORAL at 21:33

## 2020-01-01 RX ADMIN — MORPHINE SULFATE 2 MILLIGRAM(S): 50 CAPSULE, EXTENDED RELEASE ORAL at 22:15

## 2020-01-01 RX ADMIN — APIXABAN 5 MILLIGRAM(S): 2.5 TABLET, FILM COATED ORAL at 05:28

## 2020-01-01 RX ADMIN — Medication 2: at 17:40

## 2020-01-01 RX ADMIN — Medication 500 MILLIGRAM(S): at 12:51

## 2020-01-01 RX ADMIN — APIXABAN 5 MILLIGRAM(S): 2.5 TABLET, FILM COATED ORAL at 18:03

## 2020-01-01 RX ADMIN — MORPHINE SULFATE 2 MILLIGRAM(S): 50 CAPSULE, EXTENDED RELEASE ORAL at 05:28

## 2020-01-01 RX ADMIN — Medication 110 MILLIGRAM(S): at 05:03

## 2020-01-01 RX ADMIN — Medication 500 MILLIGRAM(S): at 11:17

## 2020-01-01 RX ADMIN — APIXABAN 5 MILLIGRAM(S): 2.5 TABLET, FILM COATED ORAL at 17:14

## 2020-01-01 RX ADMIN — SODIUM CHLORIDE 75 MILLILITER(S): 9 INJECTION, SOLUTION INTRAVENOUS at 15:40

## 2020-01-01 RX ADMIN — Medication 25 MILLIGRAM(S): at 05:28

## 2020-01-01 RX ADMIN — MEROPENEM 100 MILLIGRAM(S): 1 INJECTION INTRAVENOUS at 14:14

## 2020-01-01 RX ADMIN — APIXABAN 5 MILLIGRAM(S): 2.5 TABLET, FILM COATED ORAL at 06:13

## 2020-01-01 RX ADMIN — Medication 1 TABLET(S): at 11:35

## 2020-01-01 RX ADMIN — Medication 1 TABLET(S): at 12:11

## 2020-01-01 RX ADMIN — MORPHINE SULFATE 2 MILLIGRAM(S): 50 CAPSULE, EXTENDED RELEASE ORAL at 10:20

## 2020-01-01 RX ADMIN — ENOXAPARIN SODIUM 75 MILLIGRAM(S): 100 INJECTION SUBCUTANEOUS at 06:13

## 2020-01-01 RX ADMIN — REMDESIVIR 500 MILLIGRAM(S): 5 INJECTION INTRAVENOUS at 12:22

## 2020-01-01 RX ADMIN — MORPHINE SULFATE 2 MILLIGRAM(S): 50 CAPSULE, EXTENDED RELEASE ORAL at 02:00

## 2020-01-01 RX ADMIN — Medication 650 MILLIGRAM(S): at 12:13

## 2020-01-01 RX ADMIN — LISINOPRIL 2.5 MILLIGRAM(S): 2.5 TABLET ORAL at 05:39

## 2020-01-01 RX ADMIN — ATORVASTATIN CALCIUM 40 MILLIGRAM(S): 80 TABLET, FILM COATED ORAL at 21:58

## 2020-01-01 RX ADMIN — DEXMEDETOMIDINE HYDROCHLORIDE IN 0.9% SODIUM CHLORIDE 9.19 MICROGRAM(S)/KG/HR: 4 INJECTION INTRAVENOUS at 09:05

## 2020-01-01 RX ADMIN — DEXMEDETOMIDINE HYDROCHLORIDE IN 0.9% SODIUM CHLORIDE 9.19 MICROGRAM(S)/KG/HR: 4 INJECTION INTRAVENOUS at 19:44

## 2020-01-01 RX ADMIN — Medication 0.12 MILLIGRAM(S): at 05:32

## 2020-01-01 RX ADMIN — Medication 4: at 16:24

## 2020-01-01 RX ADMIN — Medication 6.89 MICROGRAM(S)/KG/MIN: at 11:00

## 2020-01-01 RX ADMIN — DEXMEDETOMIDINE HYDROCHLORIDE IN 0.9% SODIUM CHLORIDE 9.19 MICROGRAM(S)/KG/HR: 4 INJECTION INTRAVENOUS at 18:44

## 2020-01-01 RX ADMIN — ATORVASTATIN CALCIUM 40 MILLIGRAM(S): 80 TABLET, FILM COATED ORAL at 23:00

## 2020-01-01 RX ADMIN — REMDESIVIR 500 MILLIGRAM(S): 5 INJECTION INTRAVENOUS at 12:26

## 2020-01-01 RX ADMIN — Medication 2: at 08:12

## 2020-01-01 RX ADMIN — MORPHINE SULFATE 4 MILLIGRAM(S): 50 CAPSULE, EXTENDED RELEASE ORAL at 14:29

## 2020-01-01 RX ADMIN — Medication 25 MILLIGRAM(S): at 05:11

## 2020-01-01 RX ADMIN — Medication 110 MILLIGRAM(S): at 05:08

## 2020-01-01 RX ADMIN — MEROPENEM 100 MILLIGRAM(S): 1 INJECTION INTRAVENOUS at 21:50

## 2020-01-01 RX ADMIN — Medication 650 MILLIGRAM(S): at 20:22

## 2020-01-01 RX ADMIN — APIXABAN 5 MILLIGRAM(S): 2.5 TABLET, FILM COATED ORAL at 05:25

## 2020-01-01 RX ADMIN — Medication 1000 UNIT(S): at 12:55

## 2020-01-01 RX ADMIN — MORPHINE SULFATE 2 MILLIGRAM(S): 50 CAPSULE, EXTENDED RELEASE ORAL at 21:50

## 2020-01-01 RX ADMIN — Medication 0.12 MILLIGRAM(S): at 05:38

## 2020-01-01 RX ADMIN — MEROPENEM 100 MILLIGRAM(S): 1 INJECTION INTRAVENOUS at 22:44

## 2020-01-01 RX ADMIN — Medication 25 MILLIGRAM(S): at 05:31

## 2020-01-01 RX ADMIN — Medication 0.12 MILLIGRAM(S): at 05:31

## 2020-01-01 RX ADMIN — Medication 50 MILLILITER(S): at 22:18

## 2020-01-01 RX ADMIN — Medication 110 MILLIGRAM(S): at 10:02

## 2020-01-01 RX ADMIN — MORPHINE SULFATE 4 MILLIGRAM(S): 50 CAPSULE, EXTENDED RELEASE ORAL at 15:00

## 2020-01-01 RX ADMIN — Medication 1000 UNIT(S): at 12:12

## 2020-12-03 NOTE — ED ADULT NURSE NOTE - OBJECTIVE STATEMENT
Pt is A&Ox4, in NAD. Presents to ED c/o diarrhea x4 days. Pt states he has been losing weight because whenever he eats his stomach cannot tolerate it and he runs to the bathroom. Denies N/V or abdominal pain. Pt presented to ED saturating @ 90% on RA, is saturating 95% on 2L NC at this time and denies SOB. Skin is warm, dry and color appropriate for race. ED workup in progress. Will continue to monitor.

## 2020-12-03 NOTE — ED PROVIDER NOTE - PHYSICAL EXAMINATION
Gen: No acute distress, non toxic  HEENT: Mucous membranes moist, pink conjunctivae, EOMI  CV: RRR, nl s1/s2.  Resp: CTAB, normal rate and effort. faint crackles satting ~88%  GI: Abdomen soft, NT, ND. No rebound, no guarding  : No CVAT  Neuro: A&O x 3, moving all 4 extremities  MSK: No spine or joint tenderness to palpation  Skin: No rashes. intact and perfused.

## 2020-12-03 NOTE — ED ADULT TRIAGE NOTE - CHIEF COMPLAINT QUOTE
Pt A&Ox4 states "I having diarrhea for 4 days." Patient denies any abd pain, no nausea or vomiting, having diarrhea every time he eats something.

## 2020-12-03 NOTE — ED PROVIDER NOTE - OBJECTIVE STATEMENT
69 y/o male hx htn, dm, mi c/o several days of diarrhea, aches and feeling unwell. Denies significant cp, states sometimes mild sob, cough. No travel or known covid contacts. No abd pain. +nausea wihtout vomiting. No meningeal symptoms.     ROS: + fever/chills. No eye pain/changes in vision, No ear pain/sore throat/dysphagia, No chest pain/palpitations. No SOB No abdominal pain, N/V  no black/bloody bm. No dysuria/frequency/discharge, No headache. No Dizziness.    No rashes or breaks in skin. No numbness/tingling/weakness.

## 2020-12-04 NOTE — H&P ADULT - PROBLEM SELECTOR PLAN 4
toprol xl and lisinopril continued with parameters. toprol xl and lisinopril continued with parameters. cad s/p cabg, no cp, no sig. sob  no st elevation on ekg.

## 2020-12-04 NOTE — H&P ADULT - HISTORY OF PRESENT ILLNESS
69 y/o male with h/o cad s/p cabg , aicd, htn, dm, on eliquis now as per pt, not sure why he is on it , stated it is his heart related ( used to be on coumadin before ) came to the ER brought in by his wife as he has not been feeling well for past 4 days, lack of appetite, generalized weakness and loose watery diarrhea, 3-4 time / day, no blood in stool reported, no abd. pain, no vomiting, some nausea. no sob, no cough, reports no sick contact, no travel, was not on any antibiotics recently, no cp, no dizziness. t max in the .7, got tyelnol and at the time of admission 97.9. no other complaints. 69 y/o male with h/o cad s/p cabg , aicd, htn, dm, on eliquis now as per pt, not sure why he is on it , stated it is his heart related ( used to be on coumadin before ), reports no h/o blood clots, came to the ER brought in by his wife as he has not been feeling well for past 4 days, lack of appetite, generalized weakness and loose watery diarrhea, 3-4 time / day, no blood in stool reported, no abd. pain, no vomiting, some nausea. no sob, no cough, reports no sick contact, no travel, was not on any antibiotics recently, no cp, no dizziness. t max in the .7, got tyelnol and at the time of admission 97.9. no other complaints. 71 y/o male with h/o cad s/p cabg , aicd, htn, dm, on eliquis now as per pt, not sure why he is on it , stated it is his heart related ( used to be on coumadin before ), reports no h/o blood clots, came to the ER brought in by his wife as he has not been feeling well for past 4 days, lack of appetite, generalized weakness and loose watery diarrhea, 3-4 time / day, no blood in stool reported, no abd. pain, no vomiting, some nausea. no sob, no cough, reports no sick contact, no travel, was not on any antibiotics recently, no cp, no dizziness. t max in the .7, got Tylenol and at the time of admission 97.9. no other complaints. pt's o2 sat was 90 % RA and 96 % with 2 L.   I tried calling pt's son Bill at [phone] but there was no answer. 71 y/o male with h/o cad s/p cabg , aicd, htn, dm, on eliquis now as per pt, not sure why he is on it , stated it is his heart related ( used to be on coumadin before ), reports no h/o blood clots, came to the ER brought in by his wife as he has not been feeling well for past 4 days, lack of appetite, generalized weakness and loose watery diarrhea, 3-4 time / day, no blood in stool reported, no abd. pain, no vomiting, some nausea. no sob, no cough, reports no sick contact, no travel, was not on any antibiotics recently, no cp, no dizziness. pt. reported that there has been no diarrhea in the ER. t max in the .7, got Tylenol and at the time of admission 97.9. no other complaints. pt's o2 sat was 90 % RA and 96 % with 2 L.   I tried calling pt's son Bill at [phone] but there was no answer. 71 y/o male with h/o cad s/p cabg , aicd, htn, dm, on eliquis now as per pt, not sure why he is on it , stated it is his heart related ( used to be on coumadin before ), reports no h/o blood clots, came to the ER brought in by his wife as he has not been feeling well for past 4 days, lack of appetite, generalized weakness and loose watery diarrhea, 3-4 time / day, no blood in stool reported, no abd. pain, no vomiting, some nausea. no sob, no cough, reports no sick contact, no travel, was not on any antibiotics recently, no cp, no dizziness. pt. reported that there has been no diarrhea in the ER for past several hours,  t max in the .7, got Tylenol and at the time of admission 97.9. no other complaints. pt's o2 sat was 90 % RA and 96 % with 2 L. pt. is covdi-19 positive .   I called pt's son Bill at [phone]  to discuss pt's status and meds but there was no answer. Will request day team to contact [place] [facility] office to inquire about pt's use of eliquis and indication so his record can be updated, pt. stated that he uses digoxin.

## 2020-12-04 NOTE — PROGRESS NOTE ADULT - ASSESSMENT
70 year old male with PMH HTN, Dyslipidemia, T2DM, CAD s/p CABG, AICD presents with weakness, diarrhea and anorexia x 4 days.  T101.7, SPO2 90% room air, , Ferritin 1275, D-dimer <150, COVID PCR(+) and Chest X-Ray with bilateral LL infiltrates    COVID infection with Pneumonia and hypoxia  - Continue   - Supplemental o2 via NC, Albuterol MDI  - Decadron, Remdesivir  - Incentive Spirometry, Prone PRN, mobilize  - Trend fever, inflammatory marker  - ID consult appreciated    Thrombocytopenia, Lymphopenia  - likely due to viral infection. No bleeding noted    HTN - controlled on current regimen  - Continue BB, ACEI    T2DM - well controlled, anticipate hyperglycemia secondary to steroids  - BGM with SSI  - Consider basal and premeal  - A1c    Dyslipidemia  - Continue Statin    CAD s/p CABG  - Continue ASA, Statin, BB, ACEI  Presumable patient on DAOC for AF    Prophylactic measure  - VTEp; patient already on DOAC  - Consider PT it patient not mobilizing    Disposition - pending clinical course

## 2020-12-04 NOTE — H&P ADULT - NSHPPHYSICALEXAM_GEN_ALL_CORE
General: Pt. lying in bed appears somewhat tired looking but co-operative not in distress.  HEENT: AT, NC. PERRL. intact EOM. oral mucosa somewhat dry, no throat erythema or exudate.   Neck: supple. no JVD.   Chest: air entry bilaterally, no sig. rales or wheeze.   Heart: S1,S2. RRR. no heart murmur. no edema.   Abdomen: soft. non-tender in all abd. quadrants, non-distended. + BS.   Ext: no calf tenderness. ROM of all ext. intact, distal pulses intact.  Neuro: AAO x3. no focal weakness. no speech disorder, Cns ii to xii intact, motor 5/5, reflexes 2 +.  Skin: no rash noted, no cyanosis, warm to touch.   Psych : normal affect, no si/hi.

## 2020-12-04 NOTE — CONSULT NOTE ADULT - SUBJECTIVE AND OBJECTIVE BOX
[facility]  INFECTIOUS DISEASES AND INTERNAL MEDICINE at [place]  =======================================================  Eliezer Greenfield MD  Diplomates American Board of Internal Medicine and Infectious Diseases  Tel  [phone]  Fax [phone]  =======================================================    [id number]  YULI HEADLEY   HPI: This 69 y/o male with h/o cad s/p cabg , aicd, htn, dm, on eliquis now as per pt, not sure why he is on it , stated it is his heart related ( used to be on coumadin before ), reports no h/o blood clots, came to the ER brought in by his wife as he has not been feeling well for past 4 days, lack of appetite, generalized weakness and loose watery diarrhea, 3-4 time / day, no blood in stool reported, no abd. pain, no vomiting, some nausea. no sob, no cough, reports no sick contact, no travel, was not on any antibiotics recently, no cp, no dizziness. pt. reported that there has been no diarrhea in the ER for past several hours,  t max in the .7, got Tylenol and at the time of admission 97.9. no other complaints. pt's o2 sat was 90 % RA and 96 % with 2 L. pt. is COVID-19 positive .   I called pt's son Bill at [phone]  to discuss pt's status and meds but there was no answer. Will request day team to contact [place] cardiology office to inquire about pt's use of eliquis and indication so his record can be updated, pt. stated that he uses digoxin.  (04 Dec 2020 00:52)    patient confirmed with positive COVID test.   Xray of the chest with bilateral lower lobe infiltrates.     he denies sick contacts.     I have personally reviewed the labs and data; pertinent labs and data are listed in this note; please see below.   =======================================================  Past Medical & Surgical Hx:  =====================  PAST MEDICAL & SURGICAL HISTORY:  Diabetes  MI (myocardial infarction)  High cholesterol  Hypertension  AICD (automatic cardioverter/defibrillator) present  S/P CABG x 3    Problem List:  ==========  HEALTH ISSUES - PROBLEM Dx:     Social Hx:  =======  no toxic habits currently    FAMILY HISTORY:  No pertinent family history in first degree relatives    no significant family history of immunosuppressive disorders in mother or father   =======================================================    REVIEW OF SYSTEMS:  CONSTITUTIONAL:  No Fever or chills  HEENT:  No diplopia or blurred vision.  No earache, sore throat or runny nose.  CARDIOVASCULAR:  No pressure, squeezing, strangling, tightness, heaviness or aching about the chest, neck, axilla or epigastrium.  RESPIRATORY:  POSITIVE cough, shortness of breath  GASTROINTESTINAL:  No nausea, vomiting or diarrhea.  GENITOURINARY:  No dysuria, frequency or urgency. No Blood in urine  MUSCULOSKELETAL:  no joint aches, no muscle pain  SKIN:  No change in skin, hair or nails.  NEUROLOGIC:  No Headaches, seizures or weakness.  PSYCHIATRIC:  No disorder of thought or mood.  ENDOCRINE:  No heat or cold intolerance  HEMATOLOGICAL:  No easy bruising or bleeding.    =======================================================  Allergies  No Known Allergies      Antibiotics:  remdesivir  IVPB   IV Intermittent     Other medications:  ALBUTerol    90 MICROgram(s) HFA Inhaler 2 Puff(s) Inhalation every 6 hours  apixaban 5 milliGRAM(s) Oral every 12 hours  ascorbic acid 500 milliGRAM(s) Oral daily  atorvastatin 40 milliGRAM(s) Oral at bedtime  dexAMETHasone  Injectable 6 milliGRAM(s) IV Push daily  dextrose 40% Gel 15 Gram(s) Oral once  dextrose 5%. 1000 milliLiter(s) IV Continuous <Continuous>  dextrose 5%. 1000 milliLiter(s) IV Continuous <Continuous>  dextrose 50% Injectable 25 Gram(s) IV Push once  dextrose 50% Injectable 12.5 Gram(s) IV Push once  dextrose 50% Injectable 25 Gram(s) IV Push once  digoxin     Tablet 0.125 milliGRAM(s) Oral daily  glucagon  Injectable 1 milliGRAM(s) IntraMuscular once  insulin detemir injectable (LEVEMIR) 10 Unit(s) SubCutaneous at bedtime  insulin lispro (ADMELOG) corrective regimen sliding scale   SubCutaneous three times a day before meals  insulin lispro (ADMELOG) corrective regimen sliding scale   SubCutaneous at bedtime  lisinopril 2.5 milliGRAM(s) Oral daily  metoprolol succinate ER 25 milliGRAM(s) Oral daily  multivitamin/minerals 1 Tablet(s) Oral daily     remdesivir  IVPB   500 mL/Hr IV Intermittent (12-04-20 @ 12:14)      ======================================================  Physical Exam:  ============  T(F): 98.1 (04 Dec 2020 07:20), Max: 101.7 (03 Dec 2020 18:49)  HR: 66 (04 Dec 2020 07:20)  BP: 114/66 (04 Dec 2020 07:20)  RR: 20 (04 Dec 2020 07:20)  SpO2: 97% (04 Dec 2020 07:20) (90% - 98%)  temp max in last 48H T(F): , Max: 101.7 (12-03-20 @ 18:49)Height (cm): 172.7 (12-03-20 @ 18:07)  Weight (kg): 73.5 (12-03-20 @ 18:07)  BMI (kg/m2): 24.6 (12-03-20 @ 18:07)  BSA (m2): 1.87 (12-03-20 @ 18:07)    General:  No acute distress.  Eye: Pupils are equal, round and reactive to light, Extraocular movements are intact, Normal conjunctiva.  HENT: Normocephalic, Oral mucosa is moist, No pharyngeal erythema, No sinus tenderness.  Neck: Supple, No lymphadenopathy.  Respiratory: Lungs with COARSE breath sounds bilateral   Cardiovascular: Normal rate, Regular rhythm,   Gastrointestinal: Soft, Non-tender, Non-distended, Normal bowel sounds.  Genitourinary: No costovertebral angle tenderness.  Lymphatics: No lymphadenopathy neck,   Musculoskeletal: Normal range of motion, Normal strength.  Integumentary: No rash.  Neurologic: Alert, Oriented, No focal deficits, Cranial Nerves II-XII are grossly intact.  Psychiatric: Appropriate mood & affect.    =======================================================  Labs:                        14.0   2.16  )-----------( 105      ( 04 Dec 2020 08:12 )             41.8      12-04    136  |  106  |  26.0<H>  ----------------------------<  170<H>  4.9   |  19.0<L>  |  0.88    Ca    7.8<L>      04 Dec 2020 08:12    TPro  6.3<L>  /  Alb  3.1<L>  /  TBili  0.5  /  DBili  x   /  AST  52<H>  /  ALT  29  /  AlkPhos  44  12-04      Creatinine, Serum: 0.88 mg/dL (12-04-20 @ 08:12)  Creatinine, Serum: 1.06 mg/dL (12-03-20 @ 20:28)    C-Reactive Protein, Serum: 1.22 mg/dL (12-03-20 @ 20:28)    Procalcitonin, Serum: 0.11 ng/mL (12-03-20 @ 20:28)    SARS-CoV-2: Detected (12-03-20 @ 20:35)  Rapid RVP Result: Detected (12-03-20 @ 20:35)      < from: Xray Chest 1 View-PORTABLE IMMEDIATE (12.03.20 @ 22:44) >     EXAM:  XR CHEST PORTABLE IMMED 1V                          PROCEDURE DATE:  12/03/2020          INTERPRETATION:  TECHNIQUE: Single portable view of the chest.    COMPARISON: 4/15/2010 and CT chest dated 6/20/2015    CLINICAL HISTORY: Shortness of Breath, Cough,  Fever    FINDINGS:    Single frontal view of the chest demonstrates small bilateral lower lobe infiltrates. Left-sided AICD. Mediastinal sternotomy wires. The cardiomediastinal silhouette is normal. No acute osseous abnormalities. Overlying EKG leads and wires are noted    IMPRESSION: Bilateral lower lobe infiltrates.         FERNANDA KELLY MD; Attending Radiologist  This document has been electronically signed. Dec  4 2020  8:58AM    < end of copied text >

## 2020-12-04 NOTE — H&P ADULT - ASSESSMENT
In summary 71 y/o male with h/o cad s/p cabg , aicd, htn, dm, on eliquis now as per pt, not sure why he is on it , stated it is his heart related ( used to be on coumadin before ), reports no h/o blood clots, came to the ER brought in by his wife as he has not been feeling well for past 4 days, lack of appetite, generalized weakness and loose watery diarrhea, 3-4 time / day, no blood in stool reported, no abd. pain, no vomiting, some nausea. no sob, no cough, reports no sick contact, no travel, was not on any antibiotics recently, no cp, no dizziness. pt. reported that there has been no diarrhea in the ER for past several hours.  t max in the .7, got Tylenol and at the time of admission 97.9. no other complaints. pt's o2 sat was 90 % RA and 96 % with 2 L. pt. is covid -19 positive. pt. will be admitted for covid-19 infection, hypoxia and diarrhea.

## 2020-12-04 NOTE — H&P ADULT - NSICDXPASTMEDICALHX_GEN_ALL_CORE_FT
PAST MEDICAL HISTORY:  Diabetes     High cholesterol     Hypertension     MI (myocardial infarction)

## 2020-12-04 NOTE — CONSULT NOTE ADULT - ASSESSMENT
This 69 y/o male with h/o cad s/p cabg , aicd, htn, dm, on eliquis now as per pt, not sure why he is on it , stated it is his heart related ( used to be on coumadin before ), reports no h/o blood clots, came to the ER brought in by his wife as he has not been feeling well for past 4 days, lack of appetite, generalized weakness and loose watery diarrhea, 3-4 time / day, no blood in stool reported, no abd. pain, no vomiting, some nausea. no sob, no cough, reports no sick contact, no travel, was not on any antibiotics recently, no cp, no dizziness. pt. reported that there has been no diarrhea in the ER for past several hours,  t max in the .7, got Tylenol and at the time of admission 97.9. no other complaints. pt's o2 sat was 90 % RA and 96 % with 2 L. pt. is COVID-19 positive .   I called pt's son Bill at [phone]  to discuss pt's status and meds but there was no answer. Will request day team to contact [place] cardi[facility] office to inquire about pt's use of eliquis and indication so his record can be updated, pt. stated that he uses digoxin.  (04 Dec 2020 00:52)    patient confirmed with positive COVID test.   Xray of the chest with bilateral lower lobe infiltrates.     he denies sick contacts.       Impression:  COVID-19 infection   Viral pneumonia  shortness of breath  lung infiltrates  dependence on oxygen    Plan:  - start Remdesivir IV daily.   will plan for a 5 day course.  THRU 12/9/2020    May need a 10 day course if little improvement.      - continue dexamethasone 6mg daily. x 10 days    - Continue supportive care measures  - continue Oxygenation as needed;  CONTINUE to titrate down as tolerated  - self- proning  as tolerated  - ENCOURGAGED OOB to chair  - encouraged incentive spirometry       continue to trend inflammatory markers.   If procalcitonin starts to rise, may need to consider treating for secondary bacterial infections    - trend CBC with diff, CMP with LFT's  - trend Inflammatory markers (ESR, CRP, Ferritin, LDH,  procalcitonin)  q48h.  D dimer, lactate, troponin, CK, PT/PTT x 1     Will sign off.  Please call back if no improvement after 5 days of Remdesivir, to evaluate need for further Remedesivir course.    No further specific infectious disease recommendations. Will be available as needed.

## 2020-12-04 NOTE — PROGRESS NOTE ADULT - SUBJECTIVE AND OBJECTIVE BOX
HOSPITALIST PROGRESS NOTE    YULI HEADLEY  [id number]  70yMale    Patient is a 70y old  Male who presents with a chief complaint of covid- 19 infection (04 Dec 2020 12:59)      SUBJECTIVE:   Chart reviewed since last visit.  Patient seen and examined at bedside for Hypoxia, COVID       OBJECTIVE:  Vital Signs Last 24 Hrs  T(C): 36.7 (04 Dec 2020 07:20), Max: 38.7 (03 Dec 2020 18:49)  T(F): 98.1 (04 Dec 2020 07:20), Max: 101.7 (03 Dec 2020 18:49)  HR: 66 (04 Dec 2020 07:20) (64 - 95)  BP: 114/66 (04 Dec 2020 07:20) (111/66 - 124/81)   RR: 20 (04 Dec 2020 07:20) (20 - 105)  SpO2: 97% (04 Dec 2020 07:20) (90% - 98%)    PHYSICAL EXAMINATION  General:   HEENT:    NECK:    CVS:   RESP:    GI:    :   MSK:    CNS:    INTEG:    PSYCH:      MONITOR:  CAPILLARY BLOOD GLUCOSE      POCT Blood Glucose.: 169 mg/dL (04 Dec 2020 12:07)  POCT Blood Glucose.: 158 mg/dL (04 Dec 2020 08:08)        I&O's Summary                          14.0   2.16  )-----------( 105      ( 04 Dec 2020 08:12 )             41.8     PT/INR - ( 04 Dec 2020 00:52 )   PT: 15.7 sec;   INR: 1.37 ratio         PTT - ( 04 Dec 2020 00:52 )  PTT:36.3 sec  12    136  |  106  |  26.0<H>  ----------------------------<  170<H>  4.9   |  19.0<L>  |  0.88    Ca    7.8<L>      04 Dec 2020 08:12    TPro  6.3<L>  /  Alb  3.1<L>  /  TBili  0.5  /  DBili  x   /  AST  52<H>  /  ALT  29  /  AlkPhos  44  12-04        Urinalysis Basic - ( 04 Dec 2020 10:47 )    Color: Yellow / Appearance: Clear / S.020 / pH: x  Gluc: x / Ketone: Moderate  / Bili: Negative / Urobili: Negative mg/dL   Blood: x / Protein: 30 mg/dL / Nitrite: Negative   Leuk Esterase: Negative / RBC: 0-2 /HPF / WBC 0-2   Sq Epi: x / Non Sq Epi: Occasional / Bacteria: Occasional        Culture:    TTE:    RADIOLOGY        MEDICATIONS  (STANDING):  ALBUTerol    90 MICROgram(s) HFA Inhaler 2 Puff(s) Inhalation every 6 hours  apixaban 5 milliGRAM(s) Oral every 12 hours  ascorbic acid 500 milliGRAM(s) Oral daily  aspirin enteric coated 81 milliGRAM(s) Oral daily  atorvastatin 40 milliGRAM(s) Oral at bedtime  dexAMETHasone  Injectable 6 milliGRAM(s) IV Push daily  dextrose 40% Gel 15 Gram(s) Oral once  dextrose 5%. 1000 milliLiter(s) (50 mL/Hr) IV Continuous <Continuous>  dextrose 5%. 1000 milliLiter(s) (100 mL/Hr) IV Continuous <Continuous>  dextrose 50% Injectable 25 Gram(s) IV Push once  dextrose 50% Injectable 12.5 Gram(s) IV Push once  dextrose 50% Injectable 25 Gram(s) IV Push once  digoxin     Tablet 0.125 milliGRAM(s) Oral daily  glucagon  Injectable 1 milliGRAM(s) IntraMuscular once  insulin detemir injectable (LEVEMIR) 10 Unit(s) SubCutaneous at bedtime  insulin lispro (ADMELOG) corrective regimen sliding scale   SubCutaneous three times a day before meals  insulin lispro (ADMELOG) corrective regimen sliding scale   SubCutaneous at bedtime  lisinopril 2.5 milliGRAM(s) Oral daily  metoprolol succinate ER 25 milliGRAM(s) Oral daily  multivitamin/minerals 1 Tablet(s) Oral daily  remdesivir  IVPB   IV Intermittent       MEDICATIONS  (PRN):  acetaminophen   Tablet .. 650 milliGRAM(s) Oral every 6 hours PRN Temp greater or equal to 38C (100.4F)  ALBUTerol    90 MICROgram(s) HFA Inhaler 2 Puff(s) Inhalation every 4 hours PRN Shortness of Breath and/or Wheezing  ondansetron Injectable 4 milliGRAM(s) IV Push every 6 hours PRN Nausea and/or Vomiting     HOSPITALIST PROGRESS NOTE    YULI HEADLEY  [id number]  70yMale    Patient is a 70y old  Male who presents with a chief complaint of covid- 19 infection (04 Dec 2020 12:59)      SUBJECTIVE:   Chart reviewed since last visit.  Patient seen and examined at bedside for Hypoxia, COVID.  Feels dyspneic (better then yesterday - none at rest, more on exertion  Denies any nausea vomiting, abdominal pain or diarrhea.  Does take ASA at home       OBJECTIVE:  Vital Signs Last 24 Hrs  T(C): 36.7 (04 Dec 2020 07:20), Max: 38.7 (03 Dec 2020 18:49)  T(F): 98.1 (04 Dec 2020 07:20), Max: 101.7 (03 Dec 2020 18:49)  HR: 66 (04 Dec 2020 07:20) (64 - 95)  BP: 114/66 (04 Dec 2020 07:20) (111/66 - 124/81)   RR: 20 (04 Dec 2020 07:20) (20 - 105)  SpO2: 97% (04 Dec 2020 07:20) (90% - 98%)    PHYSICAL EXAMINATION  General: Older male, lying in bed, -comfortable but appears tired   HEENT:  3LNC  NECK:  supple  CVS: regular rate and rhythm S1 S2. midline scar well healed  RESP:  CTAB  GI:  Soft nondistended nontender BS+  : No suprapubic tenderness  MSK:  FROM. no edema  CNS:  No gross focal or global deficit appreciated  INTEG:  Warm dry skin  PSYCH:  Fair mood    MONITOR:  CAPILLARY BLOOD GLUCOSE      POCT Blood Glucose.: 169 mg/dL (04 Dec 2020 12:07)  POCT Blood Glucose.: 158 mg/dL (04 Dec 2020 08:08)        I&O's Summary                          14.0   2.16  )-----------( 105      ( 04 Dec 2020 08:12 )             41.8     PT/INR - ( 04 Dec 2020 00:52 )   PT: 15.7 sec;   INR: 1.37 ratio         PTT - ( 04 Dec 2020 00:52 )  PTT:36.3 sec  12-04    136  |  106  |  26.0<H>  ----------------------------<  170<H>  4.9   |  19.0<L>  |  0.88    Ca    7.8<L>      04 Dec 2020 08:12    TPro  6.3<L>  /  Alb  3.1<L>  /  TBili  0.5  /  DBili  x   /  AST  52<H>  /  ALT  29  /  AlkPhos  44  12-04        Urinalysis Basic - ( 04 Dec 2020 10:47 )    Color: Yellow / Appearance: Clear / S.020 / pH: x  Gluc: x / Ketone: Moderate  / Bili: Negative / Urobili: Negative mg/dL   Blood: x / Protein: 30 mg/dL / Nitrite: Negative   Leuk Esterase: Negative / RBC: 0-2 /HPF / WBC 0-2   Sq Epi: x / Non Sq Epi: Occasional / Bacteria: Occasional        Culture:    TTE:    RADIOLOGY        MEDICATIONS  (STANDING):  ALBUTerol    90 MICROgram(s) HFA Inhaler 2 Puff(s) Inhalation every 6 hours  apixaban 5 milliGRAM(s) Oral every 12 hours  ascorbic acid 500 milliGRAM(s) Oral daily  aspirin enteric coated 81 milliGRAM(s) Oral daily  atorvastatin 40 milliGRAM(s) Oral at bedtime  dexAMETHasone  Injectable 6 milliGRAM(s) IV Push daily  dextrose 40% Gel 15 Gram(s) Oral once  dextrose 5%. 1000 milliLiter(s) (50 mL/Hr) IV Continuous <Continuous>  dextrose 5%. 1000 milliLiter(s) (100 mL/Hr) IV Continuous <Continuous>  dextrose 50% Injectable 25 Gram(s) IV Push once  dextrose 50% Injectable 12.5 Gram(s) IV Push once  dextrose 50% Injectable 25 Gram(s) IV Push once  digoxin     Tablet 0.125 milliGRAM(s) Oral daily  glucagon  Injectable 1 milliGRAM(s) IntraMuscular once  insulin detemir injectable (LEVEMIR) 10 Unit(s) SubCutaneous at bedtime  insulin lispro (ADMELOG) corrective regimen sliding scale   SubCutaneous three times a day before meals  insulin lispro (ADMELOG) corrective regimen sliding scale   SubCutaneous at bedtime  lisinopril 2.5 milliGRAM(s) Oral daily  metoprolol succinate ER 25 milliGRAM(s) Oral daily  multivitamin/minerals 1 Tablet(s) Oral daily  remdesivir  IVPB   IV Intermittent       MEDICATIONS  (PRN):  acetaminophen   Tablet .. 650 milliGRAM(s) Oral every 6 hours PRN Temp greater or equal to 38C (100.4F)  ALBUTerol    90 MICROgram(s) HFA Inhaler 2 Puff(s) Inhalation every 4 hours PRN Shortness of Breath and/or Wheezing  ondansetron Injectable 4 milliGRAM(s) IV Push every 6 hours PRN Nausea and/or Vomiting

## 2020-12-04 NOTE — H&P ADULT - PROBLEM SELECTOR PLAN 3
possible viral and covid-19 related as it has been seen some patients with this virus  develop diarrhea, will send stool studies and to be followed. gave iv hydration for mild clinical dehydration. As per pt. diarrhea has subsided in the ER.

## 2020-12-04 NOTE — H&P ADULT - PROBLEM SELECTOR PLAN 5
type 2 dm, will continue pt's levemir as 10 units , possibly on humalog at home but not sure but van keep on humalog scale.

## 2020-12-04 NOTE — ED ADULT NURSE REASSESSMENT NOTE - NS ED NURSE REASSESS COMMENT FT1
pt resting comfortably. aao x4. respirations even and unlabored. sp02 95% on 3L nc. pt denies sob, dizziness, chest pain, no diarrhea at this time. no distress noted. will continue to monitor.

## 2020-12-05 NOTE — PROGRESS NOTE ADULT - ASSESSMENT
70 year old male with PMH HTN, Dyslipidemia, T2DM, CAD s/p CABG, AICD presents with weakness, diarrhea and anorexia x 4 days prior to arrival    #acute hypoxic respiratory failure  - 2/2 covid 19   -   - 02 prn- wean as tolerated  - supportive measures  - isolation precautions  - ID consult appreciated  - decadron  - remdesivir  - incentive spirometry    #Thrombocytopenia/ Lymphopenia    - likely due to viral infection. No bleeding noted  - monitor    #HTN - Essential  - metoprolol and lisinopril  - monitor blood pressure     #T2DM  - a1c on admit 6.3  - sliding scale and levemir  - monitor fingersticks    #Dyslipidemia  - Statin    #CAD s/p CABG  - aspirin, statin, metoprolol and lisinopril    #hx of afib?  - eliquis  - metoprolol  - digoxin    #DVT prophylaxis  -Eliquis    Disposition - pending clinical course 70 year old male with PMH HTN, Dyslipidemia, T2DM, CAD s/p CABG, AICD presents with weakness, diarrhea and anorexia x 4 days prior to arrival    #acute hypoxic respiratory failure  - 2/2 covid 19   -   - 02 prn- wean as tolerated  - supportive measures  - isolation precautions  - ID consult appreciated  - decadron  - remdesivir  - incentive spirometry    #Thrombocytopenia/ Lymphopenia    - likely due to viral infection. No bleeding noted  - monitor    #HTN - Essential  - metoprolol and lisinopril  - monitor blood pressure     #T2DM  - a1c on admit 6.3  - sliding scale and levemir  - monitor fingersticks    #Dyslipidemia  - Statin    #CAD s/p CABG  - aspirin, statin, metoprolol and lisinopril    #hx of afib?  - eliquis  - metoprolol  - digoxin    #DVT prophylaxis  -Eliquis    Disposition - pending clinical course    Attempted to call patient son Bill [phone] no answer

## 2020-12-05 NOTE — PROGRESS NOTE ADULT - SUBJECTIVE AND OBJECTIVE BOX
Patient is a 70y old  Male who presents with a chief complaint of covid- 19 infection (04 Dec 2020 14:45)    Patient seen and examined at bedside.     ALLERGIES:  No Known Allergies    MEDICATIONS  (STANDING):  ALBUTerol    90 MICROgram(s) HFA Inhaler 2 Puff(s) Inhalation every 6 hours  apixaban 5 milliGRAM(s) Oral every 12 hours  ascorbic acid 500 milliGRAM(s) Oral daily  aspirin enteric coated 81 milliGRAM(s) Oral daily  atorvastatin 40 milliGRAM(s) Oral at bedtime  dexAMETHasone  Injectable 6 milliGRAM(s) IV Push daily  dextrose 40% Gel 15 Gram(s) Oral once  dextrose 5%. 1000 milliLiter(s) (50 mL/Hr) IV Continuous <Continuous>  dextrose 5%. 1000 milliLiter(s) (100 mL/Hr) IV Continuous <Continuous>  dextrose 50% Injectable 25 Gram(s) IV Push once  dextrose 50% Injectable 12.5 Gram(s) IV Push once  dextrose 50% Injectable 25 Gram(s) IV Push once  digoxin     Tablet 0.125 milliGRAM(s) Oral daily  glucagon  Injectable 1 milliGRAM(s) IntraMuscular once  insulin detemir injectable (LEVEMIR) 10 Unit(s) SubCutaneous at bedtime  insulin lispro (ADMELOG) corrective regimen sliding scale   SubCutaneous three times a day before meals  insulin lispro (ADMELOG) corrective regimen sliding scale   SubCutaneous at bedtime  lisinopril 2.5 milliGRAM(s) Oral daily  metoprolol succinate ER 25 milliGRAM(s) Oral daily  multivitamin/minerals 1 Tablet(s) Oral daily  remdesivir  IVPB   IV Intermittent   remdesivir  IVPB 100 milliGRAM(s) IV Intermittent every 24 hours    MEDICATIONS  (PRN):  acetaminophen   Tablet .. 650 milliGRAM(s) Oral every 6 hours PRN Temp greater or equal to 38C (100.4F)  ALBUTerol    90 MICROgram(s) HFA Inhaler 2 Puff(s) Inhalation every 4 hours PRN Shortness of Breath and/or Wheezing  ondansetron Injectable 4 milliGRAM(s) IV Push every 6 hours PRN Nausea and/or Vomiting    Vital Signs Last 24 Hrs  T(F): 97.6 (05 Dec 2020 05:15), Max: 98.2 (04 Dec 2020 23:03)  HR: 65 (05 Dec 2020 05:15) (65 - 75)  BP: 132/78 (05 Dec 2020 05:15) (115/69 - 138/67)  RR: 18 (05 Dec 2020 05:15) (18 - 18)  SpO2: 94% (05 Dec 2020 05:15) (91% - 96%)  I&O's Summary    PHYSICAL EXAM:  General: NAD, A/O x 3; elderly  ENT: MMM, no thrush  Neck: Supple, No JVD  Lungs: decreased breath sounds b/l bases  Cardio: +S1/S2, No pitting edema  Abdomen: Soft, Nontender, Nondistended; Bowel sounds present  Extremities: No calf tenderness    LABS:                        14.2   8.19  )-----------( 139      ( 05 Dec 2020 06:19 )             41.6         138  |  107  |  37.0  ----------------------------<  185  4.6   |  20.0  |  0.84    Ca    8.1      05 Dec 2020 06:19    TPro  6.3  /  Alb  3.2  /  TBili  0.4  /  DBili  0.1  /  AST  45  /  ALT  30  /  AlkPhos  45      eGFR if Non African American: 87 mL/min/1.73M2 (20 @ 06:19)  eGFR if African American: 101 mL/min/1.73M2 (20 @ 06:19)    PT/INR - ( 04 Dec 2020 00:52 )   PT: 15.7 sec;   INR: 1.37 ratio    PTT - ( 04 Dec 2020 00:52 )  PTT:36.3 sec    Procalcitonin, Serum: 0.11 ng/mL (20 @ 20:28)    00:50 - VBG - pH:       | pCO2:       | pO2:       | Lactate: 0.8      Glucose  POCT Blood Glucose.: 173 mg/dL (04 Dec 2020 22:59)  POCT Blood Glucose.: 233 mg/dL (04 Dec 2020 16:21)  POCT Blood Glucose.: 169 mg/dL (04 Dec 2020 12:07)  POCT Blood Glucose.: 158 mg/dL (04 Dec 2020 08:08)    Urinalysis Basic - ( 04 Dec 2020 10:47 )  Color: Yellow / Appearance: Clear / S.020 / pH: x  Gluc: x / Ketone: Moderate  / Bili: Negative / Urobili: Negative mg/dL   Blood: x / Protein: 30 mg/dL / Nitrite: Negative   Leuk Esterase: Negative / RBC: 0-2 /HPF / WBC 0-2   Sq Epi: x / Non Sq Epi: Occasional / Bacteria: Occasional    GI PCR Panel, Stool (collected 04 Dec 2020 21:55)  Source: .Stool Feces  Final Report (05 Dec 2020 02:57):    GI PCR Results: NOT detected    RADIOLOGY & ADDITIONAL TESTS:  < from: Xray Chest 1 View-PORTABLE IMMEDIATE (20 @ 22:44) >  IMPRESSION: Bilateral lower lobe infiltrates.  < end of copied text >

## 2020-12-06 NOTE — PROGRESS NOTE ADULT - ASSESSMENT
70 year old male with PMH HTN, Dyslipidemia, T2DM, CAD s/p CABG, AICD presents with weakness, diarrhea and anorexia x 4 days prior to arrival    #acute hypoxic respiratory failure  - 2/2 covid 19   -   - 02 prn- wean as tolerated  - supportive measures  - isolation precautions  - ID consult appreciated  - decadron  - remdesivir  - incentive spirometry    #Thrombocytopenia/ Lymphopenia    - likely due to viral infection. No bleeding noted  - monitor    #HTN - Essential  - metoprolol and lisinopril  - monitor blood pressure     #T2DM  - a1c on admit 6.3  - sliding scale and levemir  - monitor fingersticks    #Dyslipidemia  - Statin    #CAD s/p CABG  - aspirin, statin, metoprolol and lisinopril    #hx of afib?  - eliquis  - metoprolol  - digoxin    #DVT prophylaxis  -Eliquis    Disposition - pending clinical course    Attempted to call patient son Bill [phone] no answer

## 2020-12-06 NOTE — PROGRESS NOTE ADULT - SUBJECTIVE AND OBJECTIVE BOX
Patient is a 70y old  Male who presents with a chief complaint of covid- 19 infection (05 Dec 2020 08:02)    Patient seen and examined at bedside.     ALLERGIES:  No Known Allergies    MEDICATIONS  (STANDING):  ALBUTerol    90 MICROgram(s) HFA Inhaler 2 Puff(s) Inhalation every 6 hours  apixaban 5 milliGRAM(s) Oral every 12 hours  ascorbic acid 500 milliGRAM(s) Oral daily  aspirin enteric coated 81 milliGRAM(s) Oral daily  atorvastatin 40 milliGRAM(s) Oral at bedtime  dexAMETHasone  Injectable 6 milliGRAM(s) IV Push daily  dextrose 40% Gel 15 Gram(s) Oral once  dextrose 5%. 1000 milliLiter(s) (50 mL/Hr) IV Continuous <Continuous>  dextrose 5%. 1000 milliLiter(s) (100 mL/Hr) IV Continuous <Continuous>  dextrose 50% Injectable 25 Gram(s) IV Push once  dextrose 50% Injectable 12.5 Gram(s) IV Push once  dextrose 50% Injectable 25 Gram(s) IV Push once  digoxin     Tablet 0.125 milliGRAM(s) Oral daily  glucagon  Injectable 1 milliGRAM(s) IntraMuscular once  insulin detemir injectable (LEVEMIR) 10 Unit(s) SubCutaneous at bedtime  insulin lispro (ADMELOG) corrective regimen sliding scale   SubCutaneous three times a day before meals  insulin lispro (ADMELOG) corrective regimen sliding scale   SubCutaneous at bedtime  lisinopril 2.5 milliGRAM(s) Oral daily  metoprolol succinate ER 25 milliGRAM(s) Oral daily  multivitamin/minerals 1 Tablet(s) Oral daily  remdesivir  IVPB   IV Intermittent   remdesivir  IVPB 100 milliGRAM(s) IV Intermittent every 24 hours    MEDICATIONS  (PRN):  acetaminophen   Tablet .. 650 milliGRAM(s) Oral every 6 hours PRN Temp greater or equal to 38C (100.4F)  ALBUTerol    90 MICROgram(s) HFA Inhaler 2 Puff(s) Inhalation every 4 hours PRN Shortness of Breath and/or Wheezing  ondansetron Injectable 4 milliGRAM(s) IV Push every 6 hours PRN Nausea and/or Vomiting    Vital Signs Last 24 Hrs  T(F): 97.1 (06 Dec 2020 05:09), Max: 97.7 (05 Dec 2020 21:11)  HR: 63 (06 Dec 2020 09:20) (60 - 69)  BP: 122/68 (06 Dec 2020 09:20) (115/72 - 132/78)  RR: 19 (06 Dec 2020 09:20) (18 - 19)  SpO2: 90% (06 Dec 2020 09:20) (90% - 98%)  I&O's Summary    05 Dec 2020 07:01  -  06 Dec 2020 07:00  --------------------------------------------------------  IN: 0 mL / OUT: 550 mL / NET: -550 mL      PHYSICAL EXAM:  General: NAD, A/O x 3; elderly  ENT: MMM, no thrush  Neck: Supple, No JVD  Lungs: decreased breath sounds b/l bases  Cardio: +S1/S2, No pitting edema  Abdomen: Soft, Nontender, Nondistended; Bowel sounds present  Extremities: No calf tenderness    LABS:                        14.2   8.19  )-----------( 139      ( 05 Dec 2020 06:19 )             41.6     12    x   |  x   |  x   ----------------------------<  x   x    |  x   |  0.84    Ca    8.1      05 Dec 2020 06:19    TPro  6.2  /  Alb  3.2  /  TBili  0.5  /  DBili  0.2  /  AST  38  /  ALT  31  /  AlkPhos  47  12    eGFR if Non African American: 87 mL/min/1.73M2 (20 @ 06:00)  eGFR if African American: 101 mL/min/1.73M2 (20 @ 06:00)    PT/INR - ( 04 Dec 2020 00:52 )   PT: 15.7 sec;   INR: 1.37 ratio    PTT - ( 04 Dec 2020 00:52 )  PTT:36.3 sec    Procalcitonin, Serum: 0.11 ng/mL (20 @ 20:28)    00:50 - VBG - pH:       | pCO2:       | pO2:       | Lactate: 0.8      Glucose  POCT Blood Glucose.: 143 mg/dL (06 Dec 2020 09:18)  POCT Blood Glucose.: 180 mg/dL (05 Dec 2020 21:15)  POCT Blood Glucose.: 150 mg/dL (05 Dec 2020 16:52)  POCT Blood Glucose.: 169 mg/dL (05 Dec 2020 12:43)      Urinalysis Basic - ( 04 Dec 2020 10:47 )  Color: Yellow / Appearance: Clear / S.020 / pH: x  Gluc: x / Ketone: Moderate  / Bili: Negative / Urobili: Negative mg/dL   Blood: x / Protein: 30 mg/dL / Nitrite: Negative   Leuk Esterase: Negative / RBC: 0-2 /HPF / WBC 0-2   Sq Epi: x / Non Sq Epi: Occasional / Bacteria: Occasional    Cultures  GI PCR Panel, Stool (collected 04 Dec 2020 21:55)  Source: .Stool Feces  Final Report (05 Dec 2020 02:57):    GI PCR Results: NOT detected     Culture - Stool (collected 04 Dec 2020 21:55)  Source: .Stool Feces  Preliminary Report (05 Dec 2020 18:31):    No enteric pathogens to date: Final culture pending    Culture - Urine (collected 04 Dec 2020 15:05)  Source: .Urine Clean Catch (Midstream)  Final Report (05 Dec 2020 11:24):    <10,000 CFU/mL Normal Urogenital Emily    Culture - Blood (collected 04 Dec 2020 00:54)  Source: .Blood Blood-Peripheral  Preliminary Report (06 Dec 2020 01:00):    No growth at 48 hours    Culture - Blood (collected 04 Dec 2020 00:53)  Source: .Blood Blood-Peripheral  Preliminary Report (06 Dec 2020 01:00):    No growth at 48 hours      RADIOLOGY & ADDITIONAL TESTS:  < from: Xray Chest 1 View-PORTABLE IMMEDIATE (20 @ 22:44) >  IMPRESSION: Bilateral lower lobe infiltrates.  < end of copied text >

## 2020-12-07 NOTE — PROGRESS NOTE ADULT - SUBJECTIVE AND OBJECTIVE BOX
Patient is a 70y old  Male who presents with a chief complaint of covid- 19 infection (06 Dec 2020 09:43)    Patient seen and examined at bedside.     ALLERGIES:  No Known Allergies    MEDICATIONS  (STANDING):  ALBUTerol    90 MICROgram(s) HFA Inhaler 2 Puff(s) Inhalation every 6 hours  apixaban 5 milliGRAM(s) Oral every 12 hours  ascorbic acid 500 milliGRAM(s) Oral daily  aspirin enteric coated 81 milliGRAM(s) Oral daily  atorvastatin 40 milliGRAM(s) Oral at bedtime  dexAMETHasone  Injectable 6 milliGRAM(s) IV Push daily  dextrose 40% Gel 15 Gram(s) Oral once  dextrose 5%. 1000 milliLiter(s) (50 mL/Hr) IV Continuous <Continuous>  dextrose 5%. 1000 milliLiter(s) (100 mL/Hr) IV Continuous <Continuous>  dextrose 50% Injectable 25 Gram(s) IV Push once  dextrose 50% Injectable 12.5 Gram(s) IV Push once  dextrose 50% Injectable 25 Gram(s) IV Push once  digoxin     Tablet 0.125 milliGRAM(s) Oral daily  glucagon  Injectable 1 milliGRAM(s) IntraMuscular once  insulin detemir injectable (LEVEMIR) 10 Unit(s) SubCutaneous at bedtime  insulin lispro (ADMELOG) corrective regimen sliding scale   SubCutaneous three times a day before meals  insulin lispro (ADMELOG) corrective regimen sliding scale   SubCutaneous at bedtime  lisinopril 2.5 milliGRAM(s) Oral daily  metoprolol succinate ER 25 milliGRAM(s) Oral daily  multivitamin/minerals 1 Tablet(s) Oral daily  remdesivir  IVPB 100 milliGRAM(s) IV Intermittent every 24 hours  remdesivir  IVPB   IV Intermittent     MEDICATIONS  (PRN):  acetaminophen   Tablet .. 650 milliGRAM(s) Oral every 6 hours PRN Temp greater or equal to 38C (100.4F)  ALBUTerol    90 MICROgram(s) HFA Inhaler 2 Puff(s) Inhalation every 4 hours PRN Shortness of Breath and/or Wheezing  ondansetron Injectable 4 milliGRAM(s) IV Push every 6 hours PRN Nausea and/or Vomiting    Vital Signs Last 24 Hrs  T(F): 98.4 (07 Dec 2020 09:08), Max: 98.4 (07 Dec 2020 09:08)  HR: 59 (07 Dec 2020 08:18) (59 - 79)  BP: 110/67 (07 Dec 2020 08:18) (110/67 - 123/76)  RR: 17 (07 Dec 2020 08:18) (17 - 22)  SpO2: 88% (07 Dec 2020 08:18) (87% - 92%)  I&O's Summary    06 Dec 2020 07:01  -  07 Dec 2020 07:00  --------------------------------------------------------  IN: 250 mL / OUT: 1000 mL / NET: -750 mL      PHYSICAL EXAM:  General: NAD, A/O x 3; elderly  ENT: MMM, no thrush  Neck: Supple, No JVD  Lungs: decreased breath sounds b/l bases  Cardio: +S1/S2, No pitting edema  Abdomen: Soft, Nontender, Nondistended; Bowel sounds present  Extremities: No calf tenderness    LABS:                        14.2   8.19  )-----------( 139      ( 05 Dec 2020 06:19 )             41.6     12-07    x   |  x   |  x   ----------------------------<  x   x    |  x   |  0.73    Ca    8.1      05 Dec 2020 06:19    TPro  6.2  /  Alb  3.3  /  TBili  0.8  /  DBili  0.2  /  AST  31  /  ALT  30  /  AlkPhos  52  12-07    eGFR if Non : 93 mL/min/1.73M2 (20 @ 06:07)  eGFR if : 107 mL/min/1.73M2 (20 @ 06:07)    Glucose  POCT Blood Glucose.: 138 mg/dL (07 Dec 2020 08:14)  POCT Blood Glucose.: 159 mg/dL (06 Dec 2020 21:46)  POCT Blood Glucose.: 161 mg/dL (06 Dec 2020 16:42)  POCT Blood Glucose.: 153 mg/dL (06 Dec 2020 12:20)    Urinalysis Basic - ( 04 Dec 2020 10:47 )  Color: Yellow / Appearance: Clear / S.020 / pH: x  Gluc: x / Ketone: Moderate  / Bili: Negative / Urobili: Negative mg/dL   Blood: x / Protein: 30 mg/dL / Nitrite: Negative   Leuk Esterase: Negative / RBC: 0-2 /HPF / WBC 0-2   Sq Epi: x / Non Sq Epi: Occasional / Bacteria: Occasional    Cultures  GI PCR Panel, Stool (collected 04 Dec 2020 21:55)  Source: .Stool Feces  Final Report (05 Dec 2020 02:57):    GI PCR Results: NOT detected    Culture - Stool (collected 04 Dec 2020 21:55)  Source: .Stool Feces  Final Report (06 Dec 2020 16:58):    No enteric pathogens isolated.    (Stool culture examined for Salmonella,    Shigella, Campylobacter, Aeromonas, Plesiomonas,    Vibrio, E.coli O157 and Yersinia)    Culture - Urine (collected 04 Dec 2020 15:05)  Source: .Urine Clean Catch (Midstream)  Final Report (05 Dec 2020 11:24):    <10,000 CFU/mL Normal Urogenital Emily    Culture - Blood (collected 04 Dec 2020 00:54)  Source: .Blood Blood-Peripheral  Preliminary Report (06 Dec 2020 01:00):    No growth at 48 hours    Culture - Blood (collected 04 Dec 2020 00:53)  Source: .Blood Blood-Peripheral  Preliminary Report (06 Dec 2020 01:00):    No growth at 48 hours    RADIOLOGY & ADDITIONAL TESTS:  < from: Xray Chest 1 View-PORTABLE IMMEDIATE (20 @ 22:44) >  IMPRESSION: Bilateral lower lobe infiltrates.  < end of copied text >

## 2020-12-07 NOTE — PROGRESS NOTE ADULT - SUBJECTIVE AND OBJECTIVE BOX
[facility]  INFECTIOUS DISEASES AND INTERNAL MEDICINE at [place]  =======================================================  Eliezer Greenfield MD  Diplomates American Board of Internal Medicine and Infectious Diseases  Tel  [phone]  Fax [phone]  =======================================================    [id number]  YULI HEADLEY   follow up: COVID-19    still with some SOB  remain on oxygen      =======================================================    REVIEW OF SYSTEMS:  CONSTITUTIONAL:  No Fever or chills  HEENT:  No diplopia or blurred vision.  No earache, sore throat or runny nose.  CARDIOVASCULAR:  No pressure, squeezing, strangling, tightness, heaviness or aching about the chest, neck, axilla or epigastrium.  RESPIRATORY:  POSITIVE cough, shortness of breath  GASTROINTESTINAL:  No nausea, vomiting or diarrhea.  GENITOURINARY:  No dysuria, frequency or urgency. No Blood in urine  MUSCULOSKELETAL:  no joint aches, no muscle pain  SKIN:  No change in skin, hair or nails.  NEUROLOGIC:  No Headaches, seizures or weakness.  PSYCHIATRIC:  No disorder of thought or mood.  ENDOCRINE:  No heat or cold intolerance  HEMATOLOGICAL:  No easy bruising or bleeding.    =======================================================  Allergies  No Known Allergies      ======================================================  Physical Exam:  ============     General:  No acute distress.  Eye: Pupils are equal, round and reactive to light, Extraocular movements are intact, Normal conjunctiva.  HENT: Normocephalic, Oral mucosa is moist, No pharyngeal erythema, No sinus tenderness.  Neck: Supple, No lymphadenopathy.  Respiratory: Lungs with COARSE breath sounds bilateral   Cardiovascular: Normal rate, Regular rhythm,   Gastrointestinal: Soft, Non-tender, Non-distended, Normal bowel sounds.  Genitourinary: No costovertebral angle tenderness.  Lymphatics: No lymphadenopathy neck,   Musculoskeletal: Normal range of motion, Normal strength.  Integumentary: No rash.  Neurologic: Alert, Oriented, No focal deficits, Cranial Nerves II-XII are grossly intact.  Psychiatric: Appropriate mood & affect.    =======================================================   Vitals:  ============  T(F): 98.4 (07 Dec 2020 09:08), Max: 98.4 (07 Dec 2020 09:08)  HR: 79 (07 Dec 2020 12:58)  BP: 101/71 (07 Dec 2020 12:58)  RR: 16 (07 Dec 2020 12:58)  SpO2: 88% (07 Dec 2020 12:58) (87% - 92%)  temp max in last 48H T(F): , Max: 98.4 (12-07-20 @ 09:08)    =======================================================  Current Antibiotics:  remdesivir  IVPB 100 milliGRAM(s) IV Intermittent every 24 hours  remdesivir  IVPB   IV Intermittent     Other medications:  ALBUTerol    90 MICROgram(s) HFA Inhaler 2 Puff(s) Inhalation every 6 hours  apixaban 5 milliGRAM(s) Oral every 12 hours  ascorbic acid 500 milliGRAM(s) Oral daily  aspirin enteric coated 81 milliGRAM(s) Oral daily  atorvastatin 40 milliGRAM(s) Oral at bedtime  dexAMETHasone  Injectable 6 milliGRAM(s) IV Push daily  dextrose 40% Gel 15 Gram(s) Oral once  dextrose 5%. 1000 milliLiter(s) IV Continuous <Continuous>  dextrose 5%. 1000 milliLiter(s) IV Continuous <Continuous>  dextrose 50% Injectable 25 Gram(s) IV Push once  dextrose 50% Injectable 12.5 Gram(s) IV Push once  dextrose 50% Injectable 25 Gram(s) IV Push once  digoxin     Tablet 0.125 milliGRAM(s) Oral daily  glucagon  Injectable 1 milliGRAM(s) IntraMuscular once  insulin detemir injectable (LEVEMIR) 10 Unit(s) SubCutaneous at bedtime  insulin lispro (ADMELOG) corrective regimen sliding scale   SubCutaneous three times a day before meals  insulin lispro (ADMELOG) corrective regimen sliding scale   SubCutaneous at bedtime  lisinopril 2.5 milliGRAM(s) Oral daily  metoprolol succinate ER 25 milliGRAM(s) Oral daily  multivitamin/minerals 1 Tablet(s) Oral daily      =======================================================  Labs:     12-07    x   |  x   |  x   ----------------------------<  x   x    |  x   |  0.73      TPro  6.2<L>  /  Alb  3.3  /  TBili  0.8  /  DBili  0.2  /  AST  31  /  ALT  30  /  AlkPhos  52  12-07      GI PCR Panel, Stool (collected 12-04-20 @ 21:55)  Source: .Stool Feces  Final Report (12-05-20 @ 02:57):    GI PCR Results: NOT detected    *******Please Note:*******    GI panel PCR evaluates for:    Campylobacter, Plesiomonas shigelloides, Salmonella,    Vibrio, Yersinia enterocolitica, Enteroaggregative    Escherichia coli (EAEC), Enteropathogenic E.coli (EPEC),    Enterotoxigenic E. coli (ETEC) lt/st, Shiga-like    toxin-producing E. coli (STEC) stx1/stx2,    Shigella/ Enteroinvasive E. coli (EIEC), Cryptosporidium,    Cyclospora cayetanensis, Entamoeba histolytica,    Giardia lamblia, Adenovirus F 40/41, Astrovirus,    Norovirus GI/GII, Rotavirus A, Sapovirus    Culture - Stool (collected 12-04-20 @ 21:55)  Source: .Stool Feces  Final Report (12-06-20 @ 16:58):    No enteric pathogens isolated.    (Stool culture examined for Salmonella,    Shigella, Campylobacter, Aeromonas, Plesiomonas,    Vibrio, E.coli O157 and Yersinia)    Culture - Urine (collected 12-04-20 @ 15:05)  Source: .Urine Clean Catch (Midstream)  Final Report (12-05-20 @ 11:24):    <10,000 CFU/mL Normal Urogenital Emily    Culture - Blood (collected 12-04-20 @ 00:54)  Source: .Blood Blood-Peripheral    Culture - Blood (collected 12-04-20 @ 00:53)  Source: .Blood Blood-Peripheral      Creatinine, Serum: 0.73 mg/dL (12-07-20 @ 06:07)  Creatinine, Serum: 0.84 mg/dL (12-06-20 @ 06:00)  Creatinine, Serum: 0.84 mg/dL (12-05-20 @ 06:19)  Creatinine, Serum: 0.88 mg/dL (12-04-20 @ 08:12)  Creatinine, Serum: 1.06 mg/dL (12-03-20 @ 20:28)    Procalcitonin, Serum: 0.11 ng/mL (12-03-20 @ 20:28)    D-Dimer Assay, Quantitative: <150 ng/mL DDU (12-05-20 @ 06:19)  D-Dimer Assay, Quantitative: <150 ng/mL DDU (12-03-20 @ 20:28)    Ferritin, Serum: 1050 ng/mL (12-05-20 @ 06:19)  Ferritin, Serum: 1275 ng/mL (12-03-20 @ 20:28)    C-Reactive Protein, Serum: 0.90 mg/dL (12-05-20 @ 06:19)  C-Reactive Protein, Serum: 1.22 mg/dL (12-03-20 @ 20:28)    WBC Count: 8.19 K/uL (12-05-20 @ 06:19)  WBC Count: 2.16 K/uL (12-04-20 @ 08:12)  WBC Count: 4.89 K/uL (12-03-20 @ 20:28)    SARS-CoV-2: Detected (12-03-20 @ 20:35)  Rapid RVP Result: Detected (12-03-20 @ 20:35)    COVID-19 IgG Antibody Index: <0.10 Index (12-04-20 @ 13:11)  COVID-19 IgG Antibody Interpretation: Negative (12-04-20 @ 13:11)      Alkaline Phosphatase, Serum: 52 U/L (12-07-20 @ 06:07)  Alkaline Phosphatase, Serum: 47 U/L (12-06-20 @ 06:00)  Alkaline Phosphatase, Serum: 45 U/L (12-05-20 @ 06:19)  Alkaline Phosphatase, Serum: 47 U/L (12-05-20 @ 06:19)  Alkaline Phosphatase, Serum: 44 U/L (12-04-20 @ 08:12)  Alkaline Phosphatase, Serum: 51 U/L (12-03-20 @ 20:28)  Alanine Aminotransferase (ALT/SGPT): 30 U/L (12-07-20 @ 06:07)  Alanine Aminotransferase (ALT/SGPT): 31 U/L (12-06-20 @ 06:00)  Alanine Aminotransferase (ALT/SGPT): 30 U/L (12-05-20 @ 06:19)  Alanine Aminotransferase (ALT/SGPT): 32 U/L (12-05-20 @ 06:19)  Alanine Aminotransferase (ALT/SGPT): 29 U/L (12-04-20 @ 08:12)  Alanine Aminotransferase (ALT/SGPT): 32 U/L (12-03-20 @ 20:28)  Aspartate Aminotransferase (AST/SGOT): 31 U/L (12-07-20 @ 06:07)  Aspartate Aminotransferase (AST/SGOT): 38 U/L (12-06-20 @ 06:00)  Aspartate Aminotransferase (AST/SGOT): 45 U/L (12-05-20 @ 06:19)  Aspartate Aminotransferase (AST/SGOT): 46 U/L (12-05-20 @ 06:19)  Aspartate Aminotransferase (AST/SGOT): 52 U/L (12-04-20 @ 08:12)  Aspartate Aminotransferase (AST/SGOT): 63 U/L (12-03-20 @ 20:28)  Bilirubin Total, Serum: 0.8 mg/dL (12-07-20 @ 06:07)  Bilirubin Total, Serum: 0.5 mg/dL (12-06-20 @ 06:00)  Bilirubin Total, Serum: 0.4 mg/dL (12-05-20 @ 06:19)  Bilirubin Total, Serum: 0.4 mg/dL (12-05-20 @ 06:19)  Bilirubin Total, Serum: 0.5 mg/dL (12-04-20 @ 08:12)  Bilirubin Total, Serum: 0.6 mg/dL (12-03-20 @ 20:28)  Bilirubin Direct, Serum: 0.2 mg/dL (12-07-20 @ 06:07)  Bilirubin Direct, Serum: 0.2 mg/dL (12-06-20 @ 06:00)  Bilirubin Direct, Serum: 0.1 mg/dL (12-05-20 @ 06:19)        < from: Xray Chest 1 View-PORTABLE IMMEDIATE (12.03.20 @ 22:44) >     EXAM:  XR CHEST PORTABLE IMMED 1V                          PROCEDURE DATE:  12/03/2020        INTERPRETATION:  TECHNIQUE: Single portable view of the chest.    COMPARISON: 4/15/2010 and CT chest dated 6/20/2015    CLINICAL HISTORY: Shortness of Breath, Cough,  Fever    FINDINGS:    Single frontal view of the chest demonstrates small bilateral lower lobe infiltrates. Left-sided AICD. Mediastinal sternotomy wires. The cardiomediastinal silhouette is normal. No acute osseous abnormalities. Overlying EKG leads and wires are noted    IMPRESSION: Bilateral lower lobe infiltrates.         FERNANDA KELLY MD; Attending Radiologist  This document has been electronically signed. Dec  4 2020  8:58AM    < end of copied text >

## 2020-12-07 NOTE — PROGRESS NOTE ADULT - ASSESSMENT
This 71 y/o male with h/o cad s/p cabg , aicd, htn, dm, on eliquis now as per pt, not sure why he is on it , stated it is his heart related ( used to be on coumadin before ), reports no h/o blood clots, came to the ER brought in by his wife as he has not been feeling well for past 4 days, lack of appetite, generalized weakness and loose watery diarrhea, 3-4 time / day, no blood in stool reported, no abd. pain, no vomiting, some nausea. no sob, no cough, reports no sick contact, no travel, was not on any antibiotics recently, no cp, no dizziness. pt. reported that there has been no diarrhea in the ER for past several hours,  t max in the .7, got Tylenol and at the time of admission 97.9. no other complaints. pt's o2 sat was 90 % RA and 96 % with 2 L. pt. is COVID-19 positive .   I called pt's son Bill at [phone]  to discuss pt's status and meds but there was no answer. Will request day team to contact [place] [facility]ology office to inquire about pt's use of eliquis and indication so his record can be updated, pt. stated that he uses digoxin.  (04 Dec 2020 00:52)    patient confirmed with positive COVID test.   Xray of the chest with bilateral lower lobe infiltrates.     he denies sick contacts.       Impression:  COVID-19 infection   Viral pneumonia  shortness of breath  lung infiltrates  dependence on oxygen    Plan:  - continue Remdesivir IV daily.  x 5 day course.  THRU 12/9/2020     - continue dexamethasone 6mg daily. x 10 days    - Continue supportive care measures  - continue Oxygenation as needed;  CONTINUE to titrate down as tolerated  - self- proning  as tolerated  - ENCOURAGED OOB to chair  - encouraged incentive spirometry       continue to trend inflammatory markers.   If procalcitonin starts to rise, may need to consider treating for secondary bacterial infections    - trend CBC with diff, CMP with LFT's  - trend Inflammatory markers (ESR, CRP, Ferritin, LDH,  procalcitonin)  q48h.  D dimer, lactate, troponin, CK, PT/PTT x 1

## 2020-12-08 NOTE — PROGRESS NOTE ADULT - SUBJECTIVE AND OBJECTIVE BOX
[facility]  INFECTIOUS DISEASES AND INTERNAL MEDICINE at [place]  =======================================================  Eliezer Greenfield MD  Diplomates American Board of Internal Medicine and Infectious Diseases  Tel  [phone]  Fax [phone]  =======================================================    [id number]  YULI HEADLEY   follow up: COVID-19    no new events     =======================================================    REVIEW OF SYSTEMS:  CONSTITUTIONAL:  No Fever or chills  HEENT:  No diplopia or blurred vision.  No earache, sore throat or runny nose.  CARDIOVASCULAR:  No pressure, squeezing, strangling, tightness, heaviness or aching about the chest, neck, axilla or epigastrium.  RESPIRATORY:  POSITIVE cough, shortness of breath  GASTROINTESTINAL:  No nausea, vomiting or diarrhea.  GENITOURINARY:  No dysuria, frequency or urgency. No Blood in urine  MUSCULOSKELETAL:  no joint aches, no muscle pain  SKIN:  No change in skin, hair or nails.  NEUROLOGIC:  No Headaches, seizures or weakness.  PSYCHIATRIC:  No disorder of thought or mood.  ENDOCRINE:  No heat or cold intolerance  HEMATOLOGICAL:  No easy bruising or bleeding.    =======================================================  Allergies  No Known Allergies      ======================================================  Physical Exam:  ============     General:  No acute distress.  Eye: Pupils are equal, round and reactive to light, Extraocular movements are intact, Normal conjunctiva.  HENT: Normocephalic, Oral mucosa is DRY, No pharyngeal erythema, No sinus tenderness.  Neck: Supple, No lymphadenopathy.  Respiratory: Lungs with COARSE breath sounds bilateral   Cardiovascular: Normal rate, Regular rhythm,   Gastrointestinal: Soft, Non-tender, Non-distended, Normal bowel sounds.  Genitourinary: No costovertebral angle tenderness.  Lymphatics: No lymphadenopathy neck,   Musculoskeletal: Normal range of motion, Normal strength.  Integumentary: No rash.  Neurologic: Alert, Oriented, No focal deficits, Cranial Nerves II-XII are grossly intact.  Psychiatric: Appropriate mood & affect.    =======================================================        Vitals:  ============  T(F): 98.1 (08 Dec 2020 09:11), Max: 98.1 (08 Dec 2020 09:11)  HR: 88 (08 Dec 2020 09:11)  BP: 101/64 (08 Dec 2020 09:11)  RR: 18 (08 Dec 2020 09:11)  SpO2: 90% (08 Dec 2020 09:11) (88% - 91%)  temp max in last 48H T(F): , Max: 98.4 (12-07-20 @ 09:08)    =======================================================  Current Antibiotics:  remdesivir  IVPB   IV Intermittent   remdesivir  IVPB 100 milliGRAM(s) IV Intermittent every 24 hours    Other medications:  ALBUTerol    90 MICROgram(s) HFA Inhaler 2 Puff(s) Inhalation every 6 hours  apixaban 5 milliGRAM(s) Oral every 12 hours  ascorbic acid 500 milliGRAM(s) Oral daily  aspirin enteric coated 81 milliGRAM(s) Oral daily  atorvastatin 40 milliGRAM(s) Oral at bedtime  dexAMETHasone  Injectable 6 milliGRAM(s) IV Push daily  dextrose 40% Gel 15 Gram(s) Oral once  dextrose 5%. 1000 milliLiter(s) IV Continuous <Continuous>  dextrose 5%. 1000 milliLiter(s) IV Continuous <Continuous>  dextrose 50% Injectable 25 Gram(s) IV Push once  dextrose 50% Injectable 12.5 Gram(s) IV Push once  dextrose 50% Injectable 25 Gram(s) IV Push once  digoxin     Tablet 0.125 milliGRAM(s) Oral daily  furosemide   Injectable 40 milliGRAM(s) IV Push once  glucagon  Injectable 1 milliGRAM(s) IntraMuscular once  insulin detemir injectable (LEVEMIR) 10 Unit(s) SubCutaneous at bedtime  insulin lispro (ADMELOG) corrective regimen sliding scale   SubCutaneous three times a day before meals  insulin lispro (ADMELOG) corrective regimen sliding scale   SubCutaneous at bedtime  lisinopril 2.5 milliGRAM(s) Oral daily  metoprolol succinate ER 25 milliGRAM(s) Oral daily  multivitamin/minerals 1 Tablet(s) Oral daily      =======================================================  Labs:     12-08    x   |  x   |  x   ----------------------------<  x   x    |  x   |  0.62      TPro  6.3<L>  /  Alb  3.2<L>  /  TBili  0.9  /  DBili  0.2  /  AST  33  /  ALT  37  /  AlkPhos  59  12-08         Culture - Stool (collected 12-04-20 @ 21:55)  Source: .Stool Feces  Final Report (12-06-20 @ 16:58):    No enteric pathogens isolated.    (Stool culture examined for Salmonella,    Shigella, Campylobacter, Aeromonas, Plesiomonas,    Vibrio, E.coli O157 and Yersinia)    Culture - Urine (collected 12-04-20 @ 15:05)  Source: .Urine Clean Catch (Midstream)  Final Report (12-05-20 @ 11:24):    <10,000 CFU/mL Normal Urogenital Emily    Culture - Blood (collected 12-04-20 @ 00:54)  Source: .Blood Blood-Peripheral    Culture - Blood (collected 12-04-20 @ 00:53)  Source: .Blood Blood-Peripheral      Creatinine, Serum: 0.62 mg/dL (12-08-20 @ 07:43)  Creatinine, Serum: 0.73 mg/dL (12-07-20 @ 06:07)  Creatinine, Serum: 0.84 mg/dL (12-06-20 @ 06:00)  Creatinine, Serum: 0.84 mg/dL (12-05-20 @ 06:19)  Creatinine, Serum: 0.88 mg/dL (12-04-20 @ 08:12)  Creatinine, Serum: 1.06 mg/dL (12-03-20 @ 20:28)    Procalcitonin, Serum: 0.11 ng/mL (12-03-20 @ 20:28)    D-Dimer Assay, Quantitative: <150 ng/mL DDU (12-05-20 @ 06:19)  D-Dimer Assay, Quantitative: <150 ng/mL DDU (12-03-20 @ 20:28)    Ferritin, Serum: 1050 ng/mL (12-05-20 @ 06:19)  Ferritin, Serum: 1275 ng/mL (12-03-20 @ 20:28)    C-Reactive Protein, Serum: 0.90 mg/dL (12-05-20 @ 06:19)  C-Reactive Protein, Serum: 1.22 mg/dL (12-03-20 @ 20:28)    WBC Count: 8.19 K/uL (12-05-20 @ 06:19)  WBC Count: 2.16 K/uL (12-04-20 @ 08:12)  WBC Count: 4.89 K/uL (12-03-20 @ 20:28)    SARS-CoV-2: Detected (12-03-20 @ 20:35)  Rapid RVP Result: Detected (12-03-20 @ 20:35)    COVID-19 IgG Antibody Index: <0.10 Index (12-04-20 @ 13:11)  COVID-19 IgG Antibody Interpretation: Negative (12-04-20 @ 13:11)      Alkaline Phosphatase, Serum: 59 U/L (12-08-20 @ 07:43)  Alkaline Phosphatase, Serum: 52 U/L (12-07-20 @ 06:07)  Alkaline Phosphatase, Serum: 47 U/L (12-06-20 @ 06:00)  Alkaline Phosphatase, Serum: 45 U/L (12-05-20 @ 06:19)  Alkaline Phosphatase, Serum: 47 U/L (12-05-20 @ 06:19)  Alanine Aminotransferase (ALT/SGPT): 37 U/L (12-08-20 @ 07:43)  Alanine Aminotransferase (ALT/SGPT): 30 U/L (12-07-20 @ 06:07)  Alanine Aminotransferase (ALT/SGPT): 31 U/L (12-06-20 @ 06:00)  Alanine Aminotransferase (ALT/SGPT): 30 U/L (12-05-20 @ 06:19)  Alanine Aminotransferase (ALT/SGPT): 32 U/L (12-05-20 @ 06:19)  Aspartate Aminotransferase (AST/SGOT): 33 U/L (12-08-20 @ 07:43)  Aspartate Aminotransferase (AST/SGOT): 31 U/L (12-07-20 @ 06:07)  Aspartate Aminotransferase (AST/SGOT): 38 U/L (12-06-20 @ 06:00)  Aspartate Aminotransferase (AST/SGOT): 45 U/L (12-05-20 @ 06:19)  Aspartate Aminotransferase (AST/SGOT): 46 U/L (12-05-20 @ 06:19)  Bilirubin Total, Serum: 0.9 mg/dL (12-08-20 @ 07:43)  Bilirubin Total, Serum: 0.8 mg/dL (12-07-20 @ 06:07)  Bilirubin Total, Serum: 0.5 mg/dL (12-06-20 @ 06:00)  Bilirubin Total, Serum: 0.4 mg/dL (12-05-20 @ 06:19)  Bilirubin Total, Serum: 0.4 mg/dL (12-05-20 @ 06:19)  Bilirubin Direct, Serum: 0.2 mg/dL (12-08-20 @ 07:43)  Bilirubin Direct, Serum: 0.2 mg/dL (12-07-20 @ 06:07)  Bilirubin Direct, Serum: 0.2 mg/dL (12-06-20 @ 06:00)  Bilirubin Direct, Serum: 0.1 mg/dL (12-05-20 @ 06:19)

## 2020-12-08 NOTE — PROGRESS NOTE ADULT - ASSESSMENT
This 71 y/o male with h/o cad s/p cabg , aicd, htn, dm, on eliquis now as per pt, not sure why he is on it , stated it is his heart related ( used to be on coumadin before ), reports no h/o blood clots, came to the ER brought in by his wife as he has not been feeling well for past 4 days, lack of appetite, generalized weakness and loose watery diarrhea, 3-4 time / day, no blood in stool reported, no abd. pain, no vomiting, some nausea. no sob, no cough, reports no sick contact, no travel, was not on any antibiotics recently, no cp, no dizziness. pt. reported that there has been no diarrhea in the ER for past several hours,  t max in the .7, got Tylenol and at the time of admission 97.9. no other complaints. pt's o2 sat was 90 % RA and 96 % with 2 L. pt. is COVID-19 positive .   I called pt's son Bill at [phone]  to discuss pt's status and meds but there was no answer. Will request day team to contact [place] [facility]ology office to inquire about pt's use of eliquis and indication so his record can be updated, pt. stated that he uses digoxin.  (04 Dec 2020 00:52)    patient confirmed with positive COVID test.   Xray of the chest with bilateral lower lobe infiltrates.     he denies sick contacts.       Impression:  COVID-19 infection   Viral pneumonia  shortness of breath  lung infiltrates  dependence on oxygen    Plan:  - continue Remdesivir IV daily.  x 5 day course.  THRU 12/9/2020     - continue dexamethasone 6mg daily. x 10 days    - Continue supportive care measures  - continue Oxygenation as needed;  CONTINUE to titrate down as tolerated  - self- proning  as tolerated  - ENCOURAGED OOB to chair  - encouraged incentive spirometry       continue to trend inflammatory markers.     CRP down trending      If procalcitonin starts to rise, may need to consider treating for secondary bacterial infections    - trend CBC with diff, CMP with LFT's  - trend Inflammatory markers (ESR, CRP, Ferritin, LDH,  procalcitonin)  q48h.  D dimer, lactate, troponin, CK, PT/PTT x 1   No

## 2020-12-08 NOTE — PROGRESS NOTE ADULT - SUBJECTIVE AND OBJECTIVE BOX
Patient is a 70y old  Male who presents with a chief complaint of covid- 19 infection (07 Dec 2020 09:37)    Patient seen and examined at bedside.     ALLERGIES:  No Known Allergies    MEDICATIONS  (STANDING):  ALBUTerol    90 MICROgram(s) HFA Inhaler 2 Puff(s) Inhalation every 6 hours  apixaban 5 milliGRAM(s) Oral every 12 hours  ascorbic acid 500 milliGRAM(s) Oral daily  aspirin enteric coated 81 milliGRAM(s) Oral daily  atorvastatin 40 milliGRAM(s) Oral at bedtime  dexAMETHasone  Injectable 6 milliGRAM(s) IV Push daily  dextrose 40% Gel 15 Gram(s) Oral once  dextrose 5%. 1000 milliLiter(s) (50 mL/Hr) IV Continuous <Continuous>  dextrose 5%. 1000 milliLiter(s) (100 mL/Hr) IV Continuous <Continuous>  dextrose 50% Injectable 25 Gram(s) IV Push once  dextrose 50% Injectable 12.5 Gram(s) IV Push once  dextrose 50% Injectable 25 Gram(s) IV Push once  digoxin     Tablet 0.125 milliGRAM(s) Oral daily  glucagon  Injectable 1 milliGRAM(s) IntraMuscular once  insulin detemir injectable (LEVEMIR) 10 Unit(s) SubCutaneous at bedtime  insulin lispro (ADMELOG) corrective regimen sliding scale   SubCutaneous three times a day before meals  insulin lispro (ADMELOG) corrective regimen sliding scale   SubCutaneous at bedtime  lisinopril 2.5 milliGRAM(s) Oral daily  metoprolol succinate ER 25 milliGRAM(s) Oral daily  multivitamin/minerals 1 Tablet(s) Oral daily    MEDICATIONS  (PRN):  acetaminophen   Tablet .. 650 milliGRAM(s) Oral every 6 hours PRN Temp greater or equal to 38C (100.4F)  ALBUTerol    90 MICROgram(s) HFA Inhaler 2 Puff(s) Inhalation every 4 hours PRN Shortness of Breath and/or Wheezing  ondansetron Injectable 4 milliGRAM(s) IV Push every 6 hours PRN Nausea and/or Vomiting    Vital Signs Last 24 Hrs  T(F): 97.6 (08 Dec 2020 16:24), Max: 98.1 (08 Dec 2020 09:11)  HR: 60 (08 Dec 2020 16:24) (60 - 88)  BP: 118/68 (08 Dec 2020 16:24) (101/64 - 134/81)  RR: 18 (08 Dec 2020 16:24) (18 - 18)  SpO2: 92% (08 Dec 2020 16:24) (90% - 92%)  I&O's Summary    PHYSICAL EXAM:  General: NAD, A/O x 3; elderly  ENT: MMM, no thrush  Neck: Supple, No JVD  Lungs: decreased breath sounds b/l bases  Cardio: +S1/S2, No pitting edema  Abdomen: Soft, Nontender, Nondistended; Bowel sounds present  Extremities: No calf tenderness    LABS:    12-08    x   |  x   |  x   ----------------------------<  x   x    |  x   |  0.62      TPro  6.3  /  Alb  3.2  /  TBili  0.9  /  DBili  0.2  /  AST  33  /  ALT  37  /  AlkPhos  59  12-08    eGFR if : 115 mL/min/1.73M2 (12-08-20 @ 07:43)  eGFR if Non African American: 99 mL/min/1.73M2 (12-08-20 @ 07:43)    Glucose  POCT Blood Glucose.: 187 mg/dL (08 Dec 2020 12:41)  POCT Blood Glucose.: 140 mg/dL (08 Dec 2020 08:26)  POCT Blood Glucose.: 131 mg/dL (07 Dec 2020 22:04)    GI PCR Panel, Stool (collected 04 Dec 2020 21:55)  Source: .Stool Feces  Final Report (05 Dec 2020 02:57):    GI PCR Results: NOT detected    Culture - Stool (collected 04 Dec 2020 21:55)  Source: .Stool Feces  Final Report (06 Dec 2020 16:58):    No enteric pathogens isolated.    (Stool culture examined for Salmonella,    Shigella, Campylobacter, Aeromonas, Plesiomonas,    Vibrio, E.coli O157 and Yersinia)    Culture - Urine (collected 04 Dec 2020 15:05)  Source: .Urine Clean Catch (Midstream)  Final Report (05 Dec 2020 11:24):    <10,000 CFU/mL Normal Urogenital Emily    Culture - Blood (collected 04 Dec 2020 00:54)  Source: .Blood Blood-Peripheral  Preliminary Report (06 Dec 2020 01:00):    No growth at 48 hours    Culture - Blood (collected 04 Dec 2020 00:53)  Source: .Blood Blood-Peripheral  Preliminary Report (06 Dec 2020 01:00):    No growth at 48 hours    RADIOLOGY & ADDITIONAL TESTS:  < from: Xray Chest 1 View-PORTABLE IMMEDIATE (12.03.20 @ 22:44) >  IMPRESSION: Bilateral lower lobe infiltrates.  < end of copied text >

## 2020-12-08 NOTE — PROGRESS NOTE ADULT - ASSESSMENT
70 year old male with PMH HTN, Dyslipidemia, T2DM, CAD s/p CABG, AICD presents with weakness, diarrhea and anorexia x 4 days prior to arrival    #acute hypoxic respiratory failure  - 2/2 covid 19   -   - 02 prn- wean as tolerated  - supportive measures  - isolation precautions  - ID consult appreciated  - decadron  - remdesivir  - incentive spirometry    #Thrombocytopenia/ Lymphopenia    - likely due to viral infection. No bleeding noted  - monitor    #HTN - Essential  - metoprolol and lisinopril  - monitor blood pressure     #T2DM  - a1c on admit 6.3  - sliding scale and levemir  - monitor fingersticks    #Dyslipidemia  - Statin    #CAD s/p CABG  - aspirin, statin, metoprolol and lisinopril    #hx of afib?  - eliquis  - metoprolol  - digoxin    #DVT prophylaxis  -Eliquis    Disposition - pending clinical course     spoke to patient wife Yakelin [phone]. hospital course to date reviewed. all questions answered. advised extra steroid dose given today. advised currently on 02 with increased o2 requirements at night. however she was stating he snores at night ?heber will need sleep study outpatient.  advised currently patient desaturates quickly with minimal activity including urinating

## 2020-12-09 NOTE — PROGRESS NOTE ADULT - ATTENDING COMMENTS
I have personally seen and examined patient on the above date.  I discussed the case with LANDRY Gordillo and I agree with findings and plan as detailed per note above, which I have amended where appropriate.

## 2020-12-09 NOTE — DIETITIAN INITIAL EVALUATION ADULT. - PERTINENT MEDS FT
MEDICATIONS  (STANDING):  ALBUTerol    90 MICROgram(s) HFA Inhaler 2 Puff(s) Inhalation every 6 hours  apixaban 5 milliGRAM(s) Oral every 12 hours  ascorbic acid 500 milliGRAM(s) Oral daily  aspirin enteric coated 81 milliGRAM(s) Oral daily  atorvastatin 40 milliGRAM(s) Oral at bedtime  dexAMETHasone  Injectable 6 milliGRAM(s) IV Push daily  dextrose 40% Gel 15 Gram(s) Oral once  dextrose 5%. 1000 milliLiter(s) (50 mL/Hr) IV Continuous <Continuous>  dextrose 5%. 1000 milliLiter(s) (100 mL/Hr) IV Continuous <Continuous>  dextrose 50% Injectable 25 Gram(s) IV Push once  dextrose 50% Injectable 12.5 Gram(s) IV Push once  dextrose 50% Injectable 25 Gram(s) IV Push once  digoxin     Tablet 0.125 milliGRAM(s) Oral daily  glucagon  Injectable 1 milliGRAM(s) IntraMuscular once  insulin detemir injectable (LEVEMIR) 10 Unit(s) SubCutaneous at bedtime  insulin lispro (ADMELOG) corrective regimen sliding scale   SubCutaneous three times a day before meals  insulin lispro (ADMELOG) corrective regimen sliding scale   SubCutaneous at bedtime  lisinopril 2.5 milliGRAM(s) Oral daily  metoprolol succinate ER 25 milliGRAM(s) Oral daily  multivitamin/minerals 1 Tablet(s) Oral daily    MEDICATIONS  (PRN):  acetaminophen   Tablet .. 650 milliGRAM(s) Oral every 6 hours PRN Temp greater or equal to 38C (100.4F)  ALBUTerol    90 MICROgram(s) HFA Inhaler 2 Puff(s) Inhalation every 4 hours PRN Shortness of Breath and/or Wheezing  ondansetron Injectable 4 milliGRAM(s) IV Push every 6 hours PRN Nausea and/or Vomiting

## 2020-12-09 NOTE — DIETITIAN INITIAL EVALUATION ADULT. - PROBLEM SELECTOR PLAN 4
toprol xl and lisinopril continued with parameters. cad s/p cabg, no cp, no sig. sob  no st elevation on ekg.

## 2020-12-09 NOTE — CHART NOTE - NSCHARTNOTEFT_GEN_A_CORE
Patient desaturating to 83-84% on NC while sleeping. Pt In NAD. Transitioned to VM for night while sleeping to provide additional O2 support. Plan to wean back down to NC in am upon waking.

## 2020-12-09 NOTE — PROGRESS NOTE ADULT - SUBJECTIVE AND OBJECTIVE BOX
[facility]  INFECTIOUS DISEASES AND INTERNAL MEDICINE at [place]  =======================================================  Eliezer Greenfield MD  Diplomates American Board of Internal Medicine and Infectious Diseases  Tel  [phone]  Fax [phone]  =======================================================    [id number]  YUIL HEADLEY   follow up: COVID-19    back up on ventimask    =======================================================    REVIEW OF SYSTEMS:  CONSTITUTIONAL:  No Fever or chills  HEENT:  No diplopia or blurred vision.  No earache, sore throat or runny nose.  CARDIOVASCULAR:  No pressure, squeezing, strangling, tightness, heaviness or aching about the chest, neck, axilla or epigastrium.  RESPIRATORY:  POSITIVE cough, shortness of breath  GASTROINTESTINAL:  No nausea, vomiting or diarrhea.  GENITOURINARY:  No dysuria, frequency or urgency. No Blood in urine  MUSCULOSKELETAL:  no joint aches, no muscle pain  SKIN:  No change in skin, hair or nails.  NEUROLOGIC:  No Headaches, seizures or weakness.  PSYCHIATRIC:  No disorder of thought or mood.  ENDOCRINE:  No heat or cold intolerance  HEMATOLOGICAL:  No easy bruising or bleeding.    =======================================================  Allergies  No Known Allergies    ======================================================  Physical Exam:  ============     General:  No acute distress.  Eye: Pupils are equal, round and reactive to light, Extraocular movements are intact, Normal conjunctiva.  HENT: Normocephalic, Oral mucosa is DRY, No pharyngeal erythema, No sinus tenderness.  Neck: Supple, No lymphadenopathy.  Respiratory: Lungs with COARSE breath sounds bilateral   Cardiovascular: Normal rate, Regular rhythm,   Gastrointestinal: Soft, Non-tender, Non-distended, Normal bowel sounds.  Genitourinary: No costovertebral angle tenderness.  Lymphatics: No lymphadenopathy neck,   Musculoskeletal: Normal range of motion, Normal strength.  Integumentary: No rash.  Neurologic: Alert, Oriented, No focal deficits, Cranial Nerves II-XII are grossly intact.  Psychiatric: Appropriate mood & affect.    =======================================================    Vitals:  ============  T(F): 97.4 (09 Dec 2020 09:34), Max: 97.7 (08 Dec 2020 19:16)  HR: 64 (09 Dec 2020 09:34)  BP: 114/69 (09 Dec 2020 09:34)  RR: 18 (09 Dec 2020 09:34)  SpO2: 90% (09 Dec 2020 09:34) (90% - 92%)  temp max in last 48H T(F): , Max: 98.1 (12-08-20 @ 09:11)    =======================================================  Current Antibiotics:    Other medications:  ALBUTerol    90 MICROgram(s) HFA Inhaler 2 Puff(s) Inhalation every 6 hours  apixaban 5 milliGRAM(s) Oral every 12 hours  ascorbic acid 500 milliGRAM(s) Oral daily  aspirin enteric coated 81 milliGRAM(s) Oral daily  atorvastatin 40 milliGRAM(s) Oral at bedtime  dexAMETHasone  Injectable 6 milliGRAM(s) IV Push daily  dextrose 40% Gel 15 Gram(s) Oral once  dextrose 5%. 1000 milliLiter(s) IV Continuous <Continuous>  dextrose 5%. 1000 milliLiter(s) IV Continuous <Continuous>  dextrose 50% Injectable 25 Gram(s) IV Push once  dextrose 50% Injectable 12.5 Gram(s) IV Push once  dextrose 50% Injectable 25 Gram(s) IV Push once  digoxin     Tablet 0.125 milliGRAM(s) Oral daily  glucagon  Injectable 1 milliGRAM(s) IntraMuscular once  insulin detemir injectable (LEVEMIR) 10 Unit(s) SubCutaneous at bedtime  insulin lispro (ADMELOG) corrective regimen sliding scale   SubCutaneous three times a day before meals  insulin lispro (ADMELOG) corrective regimen sliding scale   SubCutaneous at bedtime  lisinopril 2.5 milliGRAM(s) Oral daily  metoprolol succinate ER 25 milliGRAM(s) Oral daily  multivitamin/minerals 1 Tablet(s) Oral daily      =======================================================  Labs:                        16.5   19.02 )-----------( 269      ( 09 Dec 2020 12:50 )             49.2      12-09    x   |  x   |  x   ----------------------------<  x   x    |  x   |  0.89      TPro  6.9  /  Alb  3.2<L>  /  TBili  0.9  /  DBili  0.3  /  AST  28  /  ALT  38  /  AlkPhos  66  12-09      GI PCR Panel, Stool (collected 12-04-20 @ 21:55)  Source: .Stool Feces  Final Report (12-05-20 @ 02:57):    GI PCR Results: NOT detected    *******Please Note:*******    GI panel PCR evaluates for:    Campylobacter, Plesiomonas shigelloides, Salmonella,    Vibrio, Yersinia enterocolitica, Enteroaggregative    Escherichia coli (EAEC), Enteropathogenic E.coli (EPEC),    Enterotoxigenic E. coli (ETEC) lt/st, Shiga-like    toxin-producing E. coli (STEC) stx1/stx2,    Shigella/ Enteroinvasive E. coli (EIEC), Cryptosporidium,    Cyclospora cayetanensis, Entamoeba histolytica,    Giardia lamblia, Adenovirus F 40/41, Astrovirus,    Norovirus GI/GII, Rotavirus A, Sapovirus    Culture - Stool (collected 12-04-20 @ 21:55)  Source: .Stool Feces  Final Report (12-06-20 @ 16:58):    No enteric pathogens isolated.    (Stool culture examined for Salmonella,    Shigella, Campylobacter, Aeromonas, Plesiomonas,    Vibrio, E.coli O157 and Yersinia)    Culture - Urine (collected 12-04-20 @ 15:05)  Source: .Urine Clean Catch (Midstream)  Final Report (12-05-20 @ 11:24):    <10,000 CFU/mL Normal Urogenital Emily    Culture - Blood (collected 12-04-20 @ 00:54)  Source: .Blood Blood-Peripheral  Final Report (12-09-20 @ 01:00):    No growth at 5 days.    Culture - Blood (collected 12-04-20 @ 00:53)  Source: .Blood Blood-Peripheral  Final Report (12-09-20 @ 01:00):    No growth at 5 days.      Creatinine, Serum: 0.89 mg/dL (12-09-20 @ 05:45)  Creatinine, Serum: 0.89 mg/dL (12-09-20 @ 05:44)  Creatinine, Serum: 0.62 mg/dL (12-08-20 @ 07:43)  Creatinine, Serum: 0.73 mg/dL (12-07-20 @ 06:07)  Creatinine, Serum: 0.84 mg/dL (12-06-20 @ 06:00)  Creatinine, Serum: 0.84 mg/dL (12-05-20 @ 06:19)    Procalcitonin, Serum: 0.11 ng/mL (12-03-20 @ 20:28)    D-Dimer Assay, Quantitative: <150 ng/mL DDU (12-05-20 @ 06:19)  D-Dimer Assay, Quantitative: <150 ng/mL DDU (12-03-20 @ 20:28)    Ferritin, Serum: 1050 ng/mL (12-05-20 @ 06:19)  Ferritin, Serum: 1275 ng/mL (12-03-20 @ 20:28)    C-Reactive Protein, Serum: 0.90 mg/dL (12-05-20 @ 06:19)  C-Reactive Protein, Serum: 1.22 mg/dL (12-03-20 @ 20:28)    WBC Count: 19.02 K/uL (12-09-20 @ 12:50)  WBC Count: 8.19 K/uL (12-05-20 @ 06:19)    SARS-CoV-2: Detected (12-03-20 @ 20:35)  Rapid RVP Result: Detected (12-03-20 @ 20:35)    COVID-19 IgG Antibody Index: <0.10 Index (12-04-20 @ 13:11)  COVID-19 IgG Antibody Interpretation: Negative (12-04-20 @ 13:11)      Alkaline Phosphatase, Serum: 66 U/L (12-09-20 @ 05:45)  Alkaline Phosphatase, Serum: 59 U/L (12-08-20 @ 07:43)  Alkaline Phosphatase, Serum: 52 U/L (12-07-20 @ 06:07)  Alkaline Phosphatase, Serum: 47 U/L (12-06-20 @ 06:00)  Alanine Aminotransferase (ALT/SGPT): 38 U/L (12-09-20 @ 05:45)  Alanine Aminotransferase (ALT/SGPT): 37 U/L (12-08-20 @ 07:43)  Alanine Aminotransferase (ALT/SGPT): 30 U/L (12-07-20 @ 06:07)  Alanine Aminotransferase (ALT/SGPT): 31 U/L (12-06-20 @ 06:00)  Aspartate Aminotransferase (AST/SGOT): 28 U/L (12-09-20 @ 05:45)  Aspartate Aminotransferase (AST/SGOT): 33 U/L (12-08-20 @ 07:43)  Aspartate Aminotransferase (AST/SGOT): 31 U/L (12-07-20 @ 06:07)  Aspartate Aminotransferase (AST/SGOT): 38 U/L (12-06-20 @ 06:00)  Bilirubin Total, Serum: 0.9 mg/dL (12-09-20 @ 05:45)  Bilirubin Total, Serum: 0.9 mg/dL (12-09-20 @ 05:44)  Bilirubin Total, Serum: 0.9 mg/dL (12-08-20 @ 07:43)  Bilirubin Total, Serum: 0.8 mg/dL (12-07-20 @ 06:07)  Bilirubin Total, Serum: 0.5 mg/dL (12-06-20 @ 06:00)  Bilirubin Direct, Serum: 0.3 mg/dL (12-09-20 @ 05:45)  Bilirubin Direct, Serum: 0.2 mg/dL (12-08-20 @ 07:43)  Bilirubin Direct, Serum: 0.2 mg/dL (12-07-20 @ 06:07)  Bilirubin Direct, Serum: 0.2 mg/dL (12-06-20 @ 06:00)

## 2020-12-09 NOTE — PROGRESS NOTE ADULT - ASSESSMENT
This 69 y/o male with h/o cad s/p cabg , aicd, htn, dm, on eliquis now as per pt, not sure why he is on it , stated it is his heart related ( used to be on coumadin before ), reports no h/o blood clots, came to the ER brought in by his wife as he has not been feeling well for past 4 days, lack of appetite, generalized weakness and loose watery diarrhea, 3-4 time / day, no blood in stool reported, no abd. pain, no vomiting, some nausea. no sob, no cough, reports no sick contact, no travel, was not on any antibiotics recently, no cp, no dizziness. pt. reported that there has been no diarrhea in the ER for past several hours,  t max in the .7, got Tylenol and at the time of admission 97.9. no other complaints. pt's o2 sat was 90 % RA and 96 % with 2 L. pt. is COVID-19 positive .   I called pt's son Bill at [phone]  to discuss pt's status and meds but there was no answer. Will request day team to contact [facility] to inquire about pt's use of eliquis and indication so his record can be updated, pt. stated that he uses digoxin.  (04 Dec 2020 00:52)    patient confirmed with positive COVID test.   Xray of the chest with bilateral lower lobe infiltrates.     he denies sick contacts.       Impression:  COVID-19 infection   Viral pneumonia  shortness of breath  lung infiltrates  dependence on oxygen    Plan:  - continue Remdesivir IV daily.  x 5 day course.  THRU 12/9/2020  defer additional days     - continue dexamethasone 6mg daily. x 10 days    - Continue supportive care measures  - continue Oxygenation as needed;  CONTINUE to titrate down as tolerated  - self- proning  as tolerated  - ENCOURAGED OOB to chair  - encouraged incentive spirometry       continue to trend inflammatory markers.        Will follow with you.

## 2020-12-09 NOTE — DIETITIAN INITIAL EVALUATION ADULT. - CONTINUE CURRENT NUTRITION CARE PLAN
- add Glucerna TID to optimize po intake and provide an additional 220 kcal, 10g protein per serving./yes

## 2020-12-09 NOTE — PROGRESS NOTE ADULT - ASSESSMENT
70 year old male with PMH HTN, Dyslipidemia, T2DM, CAD s/p CABG, AICD presents with weakness, diarrhea and anorexia x 4 days prior to arrival. Admitted with covid PNA.     #acute hypoxic respiratory failure  - 2/2 covid 19   - Overnight desated, now on VM sating 97%. Continue to wean O2 as able  - supportive measures  - isolation precautions  - ID following   - C/w decadron and remdesivir  - incentive spirometry  - repeat inflammatory markers in am    #Thrombocytopenia/ Lymphopenia    - likely due to viral infection. No bleeding noted  - monitor    #HTN - Essential  - metoprolol and lisinopril  - monitor blood pressure     #T2DM  - a1c on admit 6.3  - Finger sticks controlled, continue to monitor  - sliding scale and levemir    #Dyslipidemia  - Statin    #CAD s/p CABG  - aspirin, statin, metoprolol and lisinopril    #hx of afib?  - C/w eliquis, metoprolol and dig    #DVT prophylaxis  -Eliquis    Disposition - pending clinical course, weaning of O2     Spoke to patient's wife Yakelin [phone] today 12/9 around 10:20 am. Hospital course to date reviewed. All questions answered.    	  70 year old male with PMH HTN, Dyslipidemia, T2DM, CAD s/p CABG, AICD presents with weakness, diarrhea and anorexia x 4 days prior to arrival. Admitted with covid PNA.     #acute hypoxic respiratory failure  - 2/2 covid 19   - Overnight desated, now on VM sating 90%. Continue to wean O2 as able  - supportive measures  - isolation precautions  - ID following   - C/w decadron and remdesivir  - incentive spirometry  - repeat inflammatory markers in am    #Thrombocytopenia/ Lymphopenia    - likely due to viral infection. No bleeding noted  - monitor    #HTN - Essential  - metoprolol and lisinopril  - monitor blood pressure     #T2DM  - a1c on admit 6.3  - Finger sticks controlled, continue to monitor  - sliding scale and levemir    #Dyslipidemia  - Statin    #CAD s/p CABG  - aspirin, statin, metoprolol and lisinopril    #hx of afib?  - C/w eliquis, metoprolol and dig    #DVT prophylaxis  -Eliquis    Disposition - pending clinical course, weaning of O2    Updated patient's wife Yakelin [phone] this morning 12/9 around 10:20 am. Hospital course to date reviewed. All questions answered.    	  70 year old male with PMH HTN, Dyslipidemia, T2DM, CAD s/p CABG, AICD presents with weakness, diarrhea and anorexia x 4 days prior to arrival. Admitted with covid PNA.     #acute hypoxic respiratory failure  - 2/2 covid 19   - Overnight desated, now on NRB. Continue to wean O2 as able  - supportive measures  - isolation precautions  - ID following   - C/w decadron and remdesivir  - incentive spirometry  - repeat inflammatory markers in am    #Thrombocytopenia/ Lymphopenia    - likely due to viral infection. No bleeding noted  - monitor    #HTN - Essential  - metoprolol and lisinopril  - monitor blood pressure     #T2DM  - a1c on admit 6.3  - Finger sticks controlled, continue to monitor  - sliding scale and levemir    #Dyslipidemia  - Statin    #CAD s/p CABG  - aspirin, statin, metoprolol and lisinopril    #hx of afib?  - C/w eliquis, metoprolol and dig    #DVT prophylaxis  -Eliquis    Disposition - pending clinical course, weaning of O2    Updated patient's wife Yakelin [phone] this morning 12/9 around 10:20 am. Hospital course to date reviewed. All questions answered.

## 2020-12-09 NOTE — DIETITIAN INITIAL EVALUATION ADULT. - OTHER INFO
70 year old male with PMH HTN, dyslipidemia, T2DM, CAD s/p CABG, AICD presents with weakness, diarrhea and anorexia x 4 days prior to arrival. Pt on COVID isolation precautions. Currently on a DASH/TLC, CCHO diet. Noted with diarrhea and poor po 4 days PTA. No edema documented. Last BM 12/3. Skin intact. RD to follow up.

## 2020-12-09 NOTE — PROGRESS NOTE ADULT - SUBJECTIVE AND OBJECTIVE BOX
Patient is a 70y old  Male who presents with a chief complaint of covid- 19 infection (07 Dec 2020 09:37)    Patient seen and examined at bedside  Patient laying in bed, on VM maintaining sats  Patient without complaints  Denies chest pain, dyspnea, n/v, fever, chills, abdominal pain  Remainder ROS neg  VSS on NRB     ICU Vital Signs Last 24 Hrs  T(C): 36.3 (09 Dec 2020 09:34), Max: 36.5 (08 Dec 2020 19:16)  T(F): 97.4 (09 Dec 2020 09:34), Max: 97.7 (08 Dec 2020 19:16)  HR: 64 (09 Dec 2020 09:34) (60 - 80)  BP: 114/69 (09 Dec 2020 09:34) (111/64 - 118/77)  RR: 18 (09 Dec 2020 09:34) (18 - 20)  SpO2: 90% (09 Dec 2020 09:34) (90% - 92%) on NRB    PHYSICAL EXAM:  General: NAD, A/O x 3; elderly  ENT: MMM, no thrush  Neck: Supple, No JVD  Lungs: decreased breath sounds b/l bases, on NRB, able to speak in full sentences   Cardio: +S1/S2, No pitting edema  Abdomen: Soft, Nontender, Nondistended; Bowel sounds present  Extremities: No calf tenderness   Patient is a 70y old  Male who presents with a chief complaint of covid- 19 infection (09 Dec 2020 09:51)    Patient seen and examined at bedside.     ALLERGIES:  No Known Allergies    MEDICATIONS  (STANDING):  ALBUTerol    90 MICROgram(s) HFA Inhaler 2 Puff(s) Inhalation every 6 hours  apixaban 5 milliGRAM(s) Oral every 12 hours  ascorbic acid 500 milliGRAM(s) Oral daily  aspirin enteric coated 81 milliGRAM(s) Oral daily  atorvastatin 40 milliGRAM(s) Oral at bedtime  dexAMETHasone  Injectable 6 milliGRAM(s) IV Push daily  dextrose 40% Gel 15 Gram(s) Oral once  dextrose 5%. 1000 milliLiter(s) (50 mL/Hr) IV Continuous <Continuous>  dextrose 5%. 1000 milliLiter(s) (100 mL/Hr) IV Continuous <Continuous>  dextrose 50% Injectable 25 Gram(s) IV Push once  dextrose 50% Injectable 12.5 Gram(s) IV Push once  dextrose 50% Injectable 25 Gram(s) IV Push once  digoxin     Tablet 0.125 milliGRAM(s) Oral daily  glucagon  Injectable 1 milliGRAM(s) IntraMuscular once  insulin detemir injectable (LEVEMIR) 10 Unit(s) SubCutaneous at bedtime  insulin lispro (ADMELOG) corrective regimen sliding scale   SubCutaneous three times a day before meals  insulin lispro (ADMELOG) corrective regimen sliding scale   SubCutaneous at bedtime  lisinopril 2.5 milliGRAM(s) Oral daily  metoprolol succinate ER 25 milliGRAM(s) Oral daily  multivitamin/minerals 1 Tablet(s) Oral daily    MEDICATIONS  (PRN):  acetaminophen   Tablet .. 650 milliGRAM(s) Oral every 6 hours PRN Temp greater or equal to 38C (100.4F)  ALBUTerol    90 MICROgram(s) HFA Inhaler 2 Puff(s) Inhalation every 4 hours PRN Shortness of Breath and/or Wheezing  ondansetron Injectable 4 milliGRAM(s) IV Push every 6 hours PRN Nausea and/or Vomiting    Vital Signs Last 24 Hrs  T(F): 97.4 (09 Dec 2020 09:34), Max: 97.7 (08 Dec 2020 19:16)  HR: 64 (09 Dec 2020 09:34) (60 - 80)  BP: 114/69 (09 Dec 2020 09:34) (111/64 - 118/77)  RR: 18 (09 Dec 2020 09:34) (18 - 20)  SpO2: 90% (09 Dec 2020 09:34) (90% - 92%)  I&O's Summary    PHYSICAL EXAM:  General: NAD, A/O x 3; elderly  ENT: MMM, no thrush  Neck: Supple, No JVD  Lungs: decreased breath sounds b/l bases  Cardio: +S1/S2, No pitting edema  Abdomen: Soft, Nontender, Nondistended; Bowel sounds present  Extremities: No calf tenderness      LABS:                        16.5   x     )-----------( 269      ( 09 Dec 2020 12:50 )             49.2     12-09    x   |  x   |  x   ----------------------------<  x   x    |  x   |  0.89      TPro  6.9  /  Alb  3.2  /  TBili  0.9  /  DBili  0.3  /  AST  28  /  ALT  38  /  AlkPhos  66  12-09    eGFR if Non African American: 85 mL/min/1.73M2 (12-09-20 @ 05:45)  eGFR if : 99 mL/min/1.73M2 (12-09-20 @ 05:45)    PT/INR - ( 09 Dec 2020 05:44 )   PT: 22.5 sec;   INR: 2.00 ratio      Glucose  POCT Blood Glucose.: 202 mg/dL (09 Dec 2020 12:15)  POCT Blood Glucose.: 189 mg/dL (09 Dec 2020 08:06)  POCT Blood Glucose.: 188 mg/dL (08 Dec 2020 22:19)  POCT Blood Glucose.: 173 mg/dL (08 Dec 2020 17:13)    GI PCR Panel, Stool (collected 04 Dec 2020 21:55)  Source: .Stool Feces  Final Report (05 Dec 2020 02:57):    GI PCR Results: NOT detected    Culture - Stool (collected 04 Dec 2020 21:55)  Source: .Stool Feces  Final Report (06 Dec 2020 16:58):    No enteric pathogens isolated.    (Stool culture examined for Salmonella,    Shigella, Campylobacter, Aeromonas, Plesiomonas,    Vibrio, E.coli O157 and Yersinia)    Culture - Urine (collected 04 Dec 2020 15:05)  Source: .Urine Clean Catch (Midstream)  Final Report (05 Dec 2020 11:24):    <10,000 CFU/mL Normal Urogenital Emily    Culture - Blood (collected 04 Dec 2020 00:54)  Source: .Blood Blood-Peripheral  Final Report (09 Dec 2020 01:00):    No growth at 5 days.    Culture - Blood (collected 04 Dec 2020 00:53)  Source: .Blood Blood-Peripheral  Final Report (09 Dec 2020 01:00):    No growth at 5 days.      RADIOLOGY & ADDITIONAL TESTS:  < from: Xray Chest 1 View-PORTABLE IMMEDIATE (12.03.20 @ 22:44) >  IMPRESSION: Bilateral lower lobe infiltrates.  < end of copied text >       Patient is a 70y old  Male who presents with a chief complaint of covid- 19 infection (09 Dec 2020 09:51)    Patient seen and examined at bedside  Patient laying in bed sleeping  No specific complaints, back on NRB  VSS otherwise    Vital Signs Last 24 Hrs  T(C): 36.3 (09 Dec 2020 09:34), Max: 36.5 (08 Dec 2020 19:16)  T(F): 97.4 (09 Dec 2020 09:34), Max: 97.7 (08 Dec 2020 19:16)  HR: 64 (09 Dec 2020 09:34) (60 - 80)  BP: 114/69 (09 Dec 2020 09:34) (111/64 - 118/77)  BP(mean): --  RR: 18 (09 Dec 2020 09:34) (18 - 20)  SpO2: 90% (09 Dec 2020 09:34) (90% - 92%) on NRB    PHYSICAL EXAM:  General: NAD, A/O x 3; elderly  ENT: MMM, no thrush  Neck: Supple, No JVD  Lungs: decreased breath sounds b/l bases  Cardio: +S1/S2, No pitting edema  Abdomen: Soft, Nontender, Nondistended; Bowel sounds present  Extremities: No calf tenderness      LABS/IMAGING: REVIEWED

## 2020-12-09 NOTE — DIETITIAN INITIAL EVALUATION ADULT. - ADD RECOMMEND
Continue MVI and vitamin C supplementation, add vitamin D daily. Encourage po intake, monitor diet tolerance, and provide assistance at meals as needed. Obtain daily weights to monitor trends.

## 2020-12-09 NOTE — DIETITIAN INITIAL EVALUATION ADULT. - PROBLEM SELECTOR PLAN 1
Isolation, tylenol prn, o2 support. cxr reviewed by me does not appear to have typical cxr findings but is covid-19 positive, will keep on dexamethasone as pt. requiring o2, will request ID consult if candidate for Remdesivir. blood cx sent as had fever 101.7 but afebrile later, likely covid-19 related, will hold antibiotics for now.

## 2020-12-10 NOTE — PROGRESS NOTE ADULT - ASSESSMENT
This 71 y/o male with h/o cad s/p cabg , aicd, htn, dm, on eliquis now as per pt, not sure why he is on it , stated it is his heart related ( used to be on coumadin before ), reports no h/o blood clots, came to the ER brought in by his wife as he has not been feeling well for past 4 days, lack of appetite, generalized weakness and loose watery diarrhea, 3-4 time / day, no blood in stool reported, no abd. pain, no vomiting, some nausea. no sob, no cough, reports no sick contact, no travel, was not on any antibiotics recently, no cp, no dizziness. pt. reported that there has been no diarrhea in the ER for past several hours,  t max in the .7, got Tylenol and at the time of admission 97.9. no other complaints. pt's o2 sat was 90 % RA and 96 % with 2 L. pt. is COVID-19 positive .   I called pt's son Bill at [phone]  to discuss pt's status and meds but there was no answer. Will request day team to contact [place] cardiology office to inquire about pt's use of eliquis and indication so his record can be updated, pt. stated that he uses digoxin.  (04 Dec 2020 00:52)    patient confirmed with positive COVID test.   Xray of the chest with bilateral lower lobe infiltrates.     he denies sick contacts.       Impression:  COVID-19 infection   Viral pneumonia  shortness of breath  lung infiltrates  dependence on oxygen    Plan:  - completed Remdesivir IV daily.  x 5 day course.  THRU 12/9/2020  - will add 5 more days due to POOR progression  --> thru 12/14/2020  x 10 days     - continue dexamethasone 6mg daily. x 10 days    - Continue supportive care measures  - continue Oxygenation as needed;  CONTINUE to titrate down as tolerated  - self- proning  as tolerated  - ENCOURAGED OOB to chair  - encouraged incentive spirometry       continue to trend inflammatory markers.        Will follow with you.

## 2020-12-10 NOTE — PROGRESS NOTE ADULT - ATTENDING COMMENTS
I have personally seen and examined patient on the above date.  I discussed the case with LANDRY Dalton and I agree with findings and plan as detailed per note above, which I have amended where appropriate.

## 2020-12-10 NOTE — PROGRESS NOTE ADULT - ASSESSMENT
70 year old male with PMH HTN, Dyslipidemia, T2DM, CAD s/p CABG, AICD presents with weakness, diarrhea and anorexia x 4 days prior to arrival. Admitted with covid PNA.     1. Acute hypoxic respiratory failure  - 2/2 covid 19   - On NRB, wean as able  - C/w decadron   - Completed 5 day course of remdesivir - further doses deferred to ID  - Supportive measures  - Isolation precautions  - ID following   - Trend inflammatory markers    2. Thrombocytopenia/ Lymphopenia    - Likely due to viral infection. No bleeding noted  - Monitor    3. HTN - Essential  - metoprolol and lisinopril  - monitor blood pressure     4. T2DM  - a1c on admit 6.3  - Finger sticks controlled, continue to monitor  - sliding scale and levemir    5. Dyslipidemia  - Statin    6. CAD s/p CABG  - aspirin, statin, metoprolol and lisinopril    7. hx of afib?  - C/w eliquis, metoprolol and dig    #DVT prophylaxis  -Eliquis    Disposition - pending clinical course, weaning of O2    Updated patient's wife Yakelin [phone] this morning 12/10 around 9:15am. Hospital course to date reviewed. All questions answered.

## 2020-12-10 NOTE — PROGRESS NOTE ADULT - SUBJECTIVE AND OBJECTIVE BOX
[facility]  INFECTIOUS DISEASES AND INTERNAL MEDICINE at [place]  =======================================================  Eliezer Greenfield MD  Diplomates American Board of Internal Medicine and Infectious Diseases  Tel  [phone]  Fax [phone]  =======================================================    [id number]  YULI HEADLEY   follow up: COVID-19    up to NRB overnight night and this AM    =======================================================    REVIEW OF SYSTEMS:  CONSTITUTIONAL:  No Fever or chills  HEENT:  No diplopia or blurred vision.  No earache, sore throat or runny nose.  CARDIOVASCULAR:  No pressure, squeezing, strangling, tightness, heaviness or aching about the chest, neck, axilla or epigastrium.  RESPIRATORY:  POSITIVE cough, shortness of breath  GASTROINTESTINAL:  No nausea, vomiting or diarrhea.  GENITOURINARY:  No dysuria, frequency or urgency. No Blood in urine  MUSCULOSKELETAL:  no joint aches, no muscle pain  SKIN:  No change in skin, hair or nails.  NEUROLOGIC:  No Headaches, seizures or weakness.  PSYCHIATRIC:  No disorder of thought or mood.  ENDOCRINE:  No heat or cold intolerance  HEMATOLOGICAL:  No easy bruising or bleeding.    =======================================================  Allergies  No Known Allergies    ======================================================  Physical Exam:  ============     General:  No acute distress.  Eye: Pupils are equal, round and reactive to light, Extraocular movements are intact, Normal conjunctiva.  HENT: Normocephalic, Oral mucosa is DRY, No pharyngeal erythema, No sinus tenderness.  Neck: Supple, No lymphadenopathy.  Respiratory: Lungs with COARSE breath sounds bilateral   Cardiovascular: Normal rate, Regular rhythm,   Gastrointestinal: Soft, Non-tender, Non-distended, Normal bowel sounds.  Genitourinary: No costovertebral angle tenderness.  Lymphatics: No lymphadenopathy neck,   Musculoskeletal: Normal range of motion, Normal strength.  Integumentary: No rash.  Neurologic: Alert, Oriented, No focal deficits, Cranial Nerves II-XII are grossly intact.  Psychiatric: Appropriate mood & affect.    =======================================================   Vitals:  ============  T(F): --  HR: 60 (10 Dec 2020 08:48)  BP: 124/74 (10 Dec 2020 08:48)  RR: 18 (10 Dec 2020 08:48)  SpO2: 93% (10 Dec 2020 08:48) (93% - 96%)  temp max in last 48H T(F): , Max: 97.7 (12-08-20 @ 19:16)    =======================================================  Current Antibiotics:    Other medications:  ALBUTerol    90 MICROgram(s) HFA Inhaler 2 Puff(s) Inhalation every 6 hours  apixaban 5 milliGRAM(s) Oral every 12 hours  ascorbic acid 500 milliGRAM(s) Oral daily  aspirin enteric coated 81 milliGRAM(s) Oral daily  atorvastatin 40 milliGRAM(s) Oral at bedtime  dexAMETHasone  Injectable 6 milliGRAM(s) IV Push daily  dextrose 40% Gel 15 Gram(s) Oral once  dextrose 5%. 1000 milliLiter(s) IV Continuous <Continuous>  dextrose 5%. 1000 milliLiter(s) IV Continuous <Continuous>  dextrose 50% Injectable 25 Gram(s) IV Push once  dextrose 50% Injectable 12.5 Gram(s) IV Push once  dextrose 50% Injectable 25 Gram(s) IV Push once  digoxin     Tablet 0.125 milliGRAM(s) Oral daily  glucagon  Injectable 1 milliGRAM(s) IntraMuscular once  insulin detemir injectable (LEVEMIR) 10 Unit(s) SubCutaneous at bedtime  insulin lispro (ADMELOG) corrective regimen sliding scale   SubCutaneous three times a day before meals  insulin lispro (ADMELOG) corrective regimen sliding scale   SubCutaneous at bedtime  lisinopril 2.5 milliGRAM(s) Oral daily  metoprolol succinate ER 25 milliGRAM(s) Oral daily  multivitamin/minerals 1 Tablet(s) Oral daily      =======================================================  Labs:                        16.5   19.02 )-----------( 269      ( 09 Dec 2020 12:50 )             49.2      12-09    x   |  x   |  x   ----------------------------<  x   x    |  x   |  0.89      TPro  6.9  /  Alb  3.2<L>  /  TBili  0.9  /  DBili  0.3  /  AST  28  /  ALT  38  /  AlkPhos  66  12-09      GI PCR Panel, Stool (collected 12-04-20 @ 21:55)  Source: .Stool Feces  Final Report (12-05-20 @ 02:57):    GI PCR Results: NOT detected    *******Please Note:*******    GI panel PCR evaluates for:    Campylobacter, Plesiomonas shigelloides, Salmonella,    Vibrio, Yersinia enterocolitica, Enteroaggregative    Escherichia coli (EAEC), Enteropathogenic E.coli (EPEC),    Enterotoxigenic E. coli (ETEC) lt/st, Shiga-like    toxin-producing E. coli (STEC) stx1/stx2,    Shigella/ Enteroinvasive E. coli (EIEC), Cryptosporidium,    Cyclospora cayetanensis, Entamoeba histolytica,    Giardia lamblia, Adenovirus F 40/41, Astrovirus,    Norovirus GI/GII, Rotavirus A, Sapovirus    Culture - Stool (collected 12-04-20 @ 21:55)  Source: .Stool Feces  Final Report (12-06-20 @ 16:58):    No enteric pathogens isolated.    (Stool culture examined for Salmonella,    Shigella, Campylobacter, Aeromonas, Plesiomonas,    Vibrio, E.coli O157 and Yersinia)    Culture - Urine (collected 12-04-20 @ 15:05)  Source: .Urine Clean Catch (Midstream)  Final Report (12-05-20 @ 11:24):    <10,000 CFU/mL Normal Urogenital Emily    Culture - Blood (collected 12-04-20 @ 00:54)  Source: .Blood Blood-Peripheral  Final Report (12-09-20 @ 01:00):    No growth at 5 days.    Culture - Blood (collected 12-04-20 @ 00:53)  Source: .Blood Blood-Peripheral  Final Report (12-09-20 @ 01:00):    No growth at 5 days.      Creatinine, Serum: 0.89 mg/dL (12-09-20 @ 05:45)  Creatinine, Serum: 0.89 mg/dL (12-09-20 @ 05:44)  Creatinine, Serum: 0.62 mg/dL (12-08-20 @ 07:43)  Creatinine, Serum: 0.73 mg/dL (12-07-20 @ 06:07)  Creatinine, Serum: 0.84 mg/dL (12-06-20 @ 06:00)    Procalcitonin, Serum: 0.11 ng/mL (12-03-20 @ 20:28)    D-Dimer Assay, Quantitative: <150 ng/mL DDU (12-09-20 @ 16:24)  D-Dimer Assay, Quantitative: <150 ng/mL DDU (12-05-20 @ 06:19)  D-Dimer Assay, Quantitative: <150 ng/mL DDU (12-03-20 @ 20:28)    Ferritin, Serum: 1050 ng/mL (12-05-20 @ 06:19)  Ferritin, Serum: 1275 ng/mL (12-03-20 @ 20:28)    C-Reactive Protein, Serum: 0.90 mg/dL (12-05-20 @ 06:19)  C-Reactive Protein, Serum: 1.22 mg/dL (12-03-20 @ 20:28)    WBC Count: 19.02 K/uL (12-09-20 @ 12:50)    SARS-CoV-2: Detected (12-03-20 @ 20:35)  Rapid RVP Result: Detected (12-03-20 @ 20:35)    COVID-19 IgG Antibody Index: <0.10 Index (12-04-20 @ 13:11)  COVID-19 IgG Antibody Interpretation: Negative (12-04-20 @ 13:11)    Alkaline Phosphatase, Serum: 66 U/L (12-09-20 @ 05:45)  Alkaline Phosphatase, Serum: 59 U/L (12-08-20 @ 07:43)  Alkaline Phosphatase, Serum: 52 U/L (12-07-20 @ 06:07)  Alanine Aminotransferase (ALT/SGPT): 38 U/L (12-09-20 @ 05:45)  Alanine Aminotransferase (ALT/SGPT): 37 U/L (12-08-20 @ 07:43)  Alanine Aminotransferase (ALT/SGPT): 30 U/L (12-07-20 @ 06:07)  Aspartate Aminotransferase (AST/SGOT): 28 U/L (12-09-20 @ 05:45)  Aspartate Aminotransferase (AST/SGOT): 33 U/L (12-08-20 @ 07:43)  Aspartate Aminotransferase (AST/SGOT): 31 U/L (12-07-20 @ 06:07)  Bilirubin Total, Serum: 0.9 mg/dL (12-09-20 @ 05:45)  Bilirubin Total, Serum: 0.9 mg/dL (12-09-20 @ 05:44)  Bilirubin Total, Serum: 0.9 mg/dL (12-08-20 @ 07:43)  Bilirubin Total, Serum: 0.8 mg/dL (12-07-20 @ 06:07)  Bilirubin Direct, Serum: 0.3 mg/dL (12-09-20 @ 05:45)  Bilirubin Direct, Serum: 0.2 mg/dL (12-08-20 @ 07:43)  Bilirubin Direct, Serum: 0.2 mg/dL (12-07-20 @ 06:07)

## 2020-12-10 NOTE — CHART NOTE - NSCHARTNOTEFT_GEN_A_CORE
Patient with complaint of feeling SOB at rest, O2 sat 92% on venti mask. Requesting nonrebreather for to alleviate symptoms while sleeping. Currently sating 96%, resting comfortably in NAD. Denies chest pain, abdominal pain, n/v/d/c, headaches, dizziness. Will attempt to wean O2 requirements as tolerated. Continue to monitor.

## 2020-12-10 NOTE — PROGRESS NOTE ADULT - SUBJECTIVE AND OBJECTIVE BOX
Patient is a 70y old  Male who presents with a chief complaint of covid- 19 infection (09 Dec 2020 09:51)    Patient seen and examined at bedside  Patient laying in bed  No specific complaints, currently on NRB  Does not voice specific complaints, irritable at bedside  Per nurse was refusing vitals etc this morning   VSS otherwise    Vital Signs Last 24 Hrs  T(C): 36.3 (09 Dec 2020 09:34), Max: 36.3 (09 Dec 2020 09:34)  T(F): 97.4 (09 Dec 2020 09:34), Max: 97.4 (09 Dec 2020 09:34)  HR: 60 (10 Dec 2020 08:48) (60 - 64)  BP: 124/74 (10 Dec 2020 08:48) (114/69 - 129/76)  BP(mean): --  RR: 18 (10 Dec 2020 08:48) (18 - 18)  SpO2: 93% (10 Dec 2020 08:48) (90% - 96%) on NRB    PHYSICAL EXAM:  General: NAD, A/O x 3; elderly  ENT: MMM, no thrush  Neck: Supple, No JVD  Lungs: decreased breath sounds b/l bases  Cardio: +S1/S2, No pitting edema  Abdomen: Soft, Nontender, Nondistended; Bowel sounds present  Extremities: No calf tenderness      LABS/IMAGING: REVIEWED

## 2020-12-11 NOTE — PROGRESS NOTE ADULT - SUBJECTIVE AND OBJECTIVE BOX
[facility]  INFECTIOUS DISEASES AND INTERNAL MEDICINE at [place]  =======================================================  Eliezer Greenfield MD  Diplomates American Board of Internal Medicine and Infectious Diseases  Tel  [phone]  Fax [phone]  =======================================================    [id number]  YULI HEADLEY   follow up: COVID-19    remains on NRB  more awake today    =======================================================    REVIEW OF SYSTEMS:  CONSTITUTIONAL:  No Fever or chills  HEENT:  No diplopia or blurred vision.  No earache, sore throat or runny nose.  CARDIOVASCULAR:  No pressure, squeezing, strangling, tightness, heaviness or aching about the chest, neck, axilla or epigastrium.  RESPIRATORY:  POSITIVE cough, shortness of breath  GASTROINTESTINAL:  No nausea, vomiting or diarrhea.  GENITOURINARY:  No dysuria, frequency or urgency. No Blood in urine  MUSCULOSKELETAL:  no joint aches, no muscle pain  SKIN:  No change in skin, hair or nails.  NEUROLOGIC:  No Headaches, seizures or weakness.  PSYCHIATRIC:  No disorder of thought or mood.  ENDOCRINE:  No heat or cold intolerance  HEMATOLOGICAL:  No easy bruising or bleeding.    =======================================================  Allergies  No Known Allergies    ======================================================  Physical Exam:  ============     General:  No acute distress.  Eye: Pupils are equal, round and reactive to light, Extraocular movements are intact, Normal conjunctiva.  HENT: Normocephalic, Oral mucosa is DRY, No pharyngeal erythema, No sinus tenderness.  Neck: Supple, No lymphadenopathy.  Respiratory: Lungs with COARSE breath sounds bilateral   Cardiovascular: Normal rate, Regular rhythm,   Gastrointestinal: Soft, Non-tender, Non-distended, Normal bowel sounds.  Genitourinary: No costovertebral angle tenderness.  Lymphatics: No lymphadenopathy neck,   Musculoskeletal: Normal range of motion, Normal strength.  Integumentary: No rash.  Neurologic: Alert, Oriented, No focal deficits, Cranial Nerves II-XII are grossly intact.  Psychiatric: Appropriate mood & affect.    =======================================================   Vitals:  ============  T(F): 96.5 (11 Dec 2020 08:57), Max: 98.2 (10 Dec 2020 15:46)  HR: 60 (11 Dec 2020 08:57)  BP: 117/66 (11 Dec 2020 08:57)  RR: 18 (11 Dec 2020 08:57)  SpO2: 96% (11 Dec 2020 08:57) (93% - 97%)  temp max in last 48H T(F): , Max: 98.2 (12-10-20 @ 15:46)    =======================================================  Current Antibiotics:  remdesivir  IVPB 100 milliGRAM(s) IV Intermittent every 24 hours    Other medications:  ALBUTerol    90 MICROgram(s) HFA Inhaler 2 Puff(s) Inhalation every 6 hours  apixaban 5 milliGRAM(s) Oral every 12 hours  ascorbic acid 500 milliGRAM(s) Oral daily  aspirin enteric coated 81 milliGRAM(s) Oral daily  atorvastatin 40 milliGRAM(s) Oral at bedtime  dexAMETHasone  Injectable 6 milliGRAM(s) IV Push daily  dextrose 40% Gel 15 Gram(s) Oral once  dextrose 5%. 1000 milliLiter(s) IV Continuous <Continuous>  dextrose 5%. 1000 milliLiter(s) IV Continuous <Continuous>  dextrose 50% Injectable 25 Gram(s) IV Push once  dextrose 50% Injectable 12.5 Gram(s) IV Push once  dextrose 50% Injectable 25 Gram(s) IV Push once  digoxin     Tablet 0.125 milliGRAM(s) Oral daily  glucagon  Injectable 1 milliGRAM(s) IntraMuscular once  insulin detemir injectable (LEVEMIR) 10 Unit(s) SubCutaneous at bedtime  insulin lispro (ADMELOG) corrective regimen sliding scale   SubCutaneous three times a day before meals  insulin lispro (ADMELOG) corrective regimen sliding scale   SubCutaneous at bedtime  lisinopril 2.5 milliGRAM(s) Oral daily  metoprolol succinate ER 25 milliGRAM(s) Oral daily  multivitamin/minerals 1 Tablet(s) Oral daily      =======================================================  Labs:                        16.5   19.02 )-----------( 269      ( 09 Dec 2020 12:50 )             49.2      12-11    x   |  x   |  x   ----------------------------<  x   x    |  x   |  0.69    Ca    8.6      10 Dec 2020 13:36    TPro  6.9  /  Alb  3.1<L>  /  TBili  1.1  /  DBili  0.2  /  AST  18  /  ALT  31  /  AlkPhos  71  12-11      GI PCR Panel, Stool (collected 12-04-20 @ 21:55)  Source: .Stool Feces  Final Report (12-05-20 @ 02:57):    GI PCR Results: NOT detected    *******Please Note:*******    GI panel PCR evaluates for:    Campylobacter, Plesiomonas shigelloides, Salmonella,    Vibrio, Yersinia enterocolitica, Enteroaggregative    Escherichia coli (EAEC), Enteropathogenic E.coli (EPEC),    Enterotoxigenic E. coli (ETEC) lt/st, Shiga-like    toxin-producing E. coli (STEC) stx1/stx2,    Shigella/ Enteroinvasive E. coli (EIEC), Cryptosporidium,    Cyclospora cayetanensis, Entamoeba histolytica,    Giardia lamblia, Adenovirus F 40/41, Astrovirus,    Norovirus GI/GII, Rotavirus A, Sapovirus    Culture - Stool (collected 12-04-20 @ 21:55)  Source: .Stool Feces  Final Report (12-06-20 @ 16:58):    No enteric pathogens isolated.    (Stool culture examined for Salmonella,    Shigella, Campylobacter, Aeromonas, Plesiomonas,    Vibrio, E.coli O157 and Yersinia)    Culture - Urine (collected 12-04-20 @ 15:05)  Source: .Urine Clean Catch (Midstream)  Final Report (12-05-20 @ 11:24):    <10,000 CFU/mL Normal Urogenital Emily    Culture - Blood (collected 12-04-20 @ 00:54)  Source: .Blood Blood-Peripheral  Final Report (12-09-20 @ 01:00):    No growth at 5 days.    Culture - Blood (collected 12-04-20 @ 00:53)  Source: .Blood Blood-Peripheral  Final Report (12-09-20 @ 01:00):    No growth at 5 days.      Creatinine, Serum: 0.69 mg/dL (12-11-20 @ 08:47)  Creatinine, Serum: 0.87 mg/dL (12-10-20 @ 13:36)  Creatinine, Serum: 0.85 mg/dL (12-10-20 @ 13:35)  Creatinine, Serum: 0.89 mg/dL (12-09-20 @ 05:45)  Creatinine, Serum: 0.89 mg/dL (12-09-20 @ 05:44)  Creatinine, Serum: 0.62 mg/dL (12-08-20 @ 07:43)  Creatinine, Serum: 0.73 mg/dL (12-07-20 @ 06:07)    Procalcitonin, Serum: 0.08 ng/mL (12-10-20 @ 13:36)  Procalcitonin, Serum: 0.11 ng/mL (12-03-20 @ 20:28)    D-Dimer Assay, Quantitative: <150 ng/mL DDU (12-09-20 @ 16:24)  D-Dimer Assay, Quantitative: <150 ng/mL DDU (12-05-20 @ 06:19)  D-Dimer Assay, Quantitative: <150 ng/mL DDU (12-03-20 @ 20:28)    Ferritin, Serum: 823 ng/mL (12-10-20 @ 13:36)  Ferritin, Serum: 1050 ng/mL (12-05-20 @ 06:19)  Ferritin, Serum: 1275 ng/mL (12-03-20 @ 20:28)    C-Reactive Protein, Serum: 1.24 mg/dL (12-10-20 @ 13:36)  C-Reactive Protein, Serum: 0.90 mg/dL (12-05-20 @ 06:19)  C-Reactive Protein, Serum: 1.22 mg/dL (12-03-20 @ 20:28)    WBC Count: 19.02 K/uL (12-09-20 @ 12:50)    SARS-CoV-2: Detected (12-03-20 @ 20:35)  Rapid RVP Result: Detected (12-03-20 @ 20:35)    COVID-19 IgG Antibody Index: <0.10 Index (12-04-20 @ 13:11)  COVID-19 IgG Antibody Interpretation: Negative (12-04-20 @ 13:11)    Lactate Dehydrogenase, Serum: 324 U/L (12-10-20 @ 13:36)    Alkaline Phosphatase, Serum: 71 U/L (12-11-20 @ 08:47)  Alkaline Phosphatase, Serum: 67 U/L (12-10-20 @ 13:36)  Alkaline Phosphatase, Serum: 68 U/L (12-10-20 @ 13:35)  Alkaline Phosphatase, Serum: 66 U/L (12-09-20 @ 05:45)  Alkaline Phosphatase, Serum: 59 U/L (12-08-20 @ 07:43)  Alanine Aminotransferase (ALT/SGPT): 31 U/L (12-11-20 @ 08:47)  Alanine Aminotransferase (ALT/SGPT): 32 U/L (12-10-20 @ 13:36)  Alanine Aminotransferase (ALT/SGPT): 32 U/L (12-10-20 @ 13:35)  Alanine Aminotransferase (ALT/SGPT): 38 U/L (12-09-20 @ 05:45)  Alanine Aminotransferase (ALT/SGPT): 37 U/L (12-08-20 @ 07:43)  Aspartate Aminotransferase (AST/SGOT): 18 U/L (12-11-20 @ 08:47)  Aspartate Aminotransferase (AST/SGOT): 20 U/L (12-10-20 @ 13:36)  Aspartate Aminotransferase (AST/SGOT): 20 U/L (12-10-20 @ 13:35)  Aspartate Aminotransferase (AST/SGOT): 28 U/L (12-09-20 @ 05:45)  Aspartate Aminotransferase (AST/SGOT): 33 U/L (12-08-20 @ 07:43)  Bilirubin Total, Serum: 1.1 mg/dL (12-11-20 @ 08:47)  Bilirubin Total, Serum: 0.9 mg/dL (12-10-20 @ 13:36)  Bilirubin Total, Serum: 0.9 mg/dL (12-10-20 @ 13:35)  Bilirubin Total, Serum: 0.9 mg/dL (12-09-20 @ 05:45)  Bilirubin Total, Serum: 0.9 mg/dL (12-09-20 @ 05:44)  Bilirubin Total, Serum: 0.9 mg/dL (12-08-20 @ 07:43)  Bilirubin Direct, Serum: 0.2 mg/dL (12-11-20 @ 08:47)  Bilirubin Direct, Serum: 0.3 mg/dL (12-10-20 @ 13:35)  Bilirubin Direct, Serum: 0.3 mg/dL (12-09-20 @ 05:45)  Bilirubin Direct, Serum: 0.2 mg/dL (12-08-20 @ 07:43)

## 2020-12-11 NOTE — BEHAVIORAL HEALTH ASSESSMENT NOTE - DETAILS
n/a denies any previous suicidal ideation and denies making statement. son - schizophrenia and anxiety

## 2020-12-11 NOTE — PROGRESS NOTE ADULT - ASSESSMENT
70 year old male with PMH HTN, Dyslipidemia, T2DM, CAD s/p CABG, AICD presents with weakness, diarrhea and anorexia x 4 days prior to arrival. Admitted with covid PNA.     #Acute hypoxic respiratory failure  - 2/2 covid 19   - oxygen NRB  - C/w decadron   - remdesivir   - Supportive measures  - Isolation precautions  - ID consult appreciated  - self prone    #Thrombocytopenia/ Lymphopenia    - Likely due to viral infection. No bleeding noted  - Monitor    #HTN - Essential  - metoprolol and lisinopril  - monitor blood pressure     #T2DM  - a1c on admit 6.3  - Finger sticks controlled, continue to monitor  - sliding scale and levemir    #?suicidal thoughts- per Wife   - psych consult pending     #Dyslipidemia  - Statin    #CAD s/p CABG  - aspirin, statin, metoprolol and lisinopril    #hx of afib?  - eliquis, metoprolol and dig    #DVT prophylaxis  - Eliquis    Disposition - pending clinical course, weaning of O2    Updated patient's wife Yakelin [phone]. Hospital course to date reviewed. All questions answered. 70 year old male with PMH HTN, Dyslipidemia, T2DM, CAD s/p CABG, AICD presents with weakness, diarrhea and anorexia x 4 days prior to arrival. Admitted with covid PNA.     #Acute hypoxic respiratory failure  - 2/2 covid 19   - oxygen NRB  - C/w decadron   - remdesivir   - Supportive measures  - Isolation precautions  - ID consult appreciated  - self prone    #Thrombocytopenia/ Lymphopenia    - Likely due to viral infection. No bleeding noted  - Monitor    #HTN - Essential  - metoprolol and lisinopril  - monitor blood pressure     #T2DM  - a1c on admit 6.3  - Finger sticks controlled, continue to monitor  - sliding scale and levemir    #?suicidal thoughts- per Wife   - psych consult pending   - constant obs    #Dyslipidemia  - Statin    #CAD s/p CABG  - aspirin, statin, metoprolol and lisinopril    #hx of afib?  - eliquis, metoprolol and dig    #DVT prophylaxis  - Eliquis    Disposition - pending clinical course, weaning of O2    Updated patient's wife Yakelin [phone]. Hospital course to date reviewed. All questions answered.

## 2020-12-11 NOTE — BEHAVIORAL HEALTH ASSESSMENT NOTE - HPI (INCLUDE ILLNESS QUALITY, SEVERITY, DURATION, TIMING, CONTEXT, MODIFYING FACTORS, ASSOCIATED SIGNS AND SYMPTOMS)
Patient is a 71 y/o  male, domiciled with spouse in [facility] cad s/p cabg , aicd, htn, dm, never received formal psychiatric treatment, no known suicide attempts, hospitalizations, no known complicated withdrawals, remote h/o aggression to objects, admitted to [facility] 12/3/20 for hypoxia in the context of COVID-19 infection. Psychiatry consulted for suicidal ideation. As per Dr. Seth, patient made suicidal statement to spouse.  Patient interviewed via phone video conference. He was observed wearing a rebreather and appeared SOB. He is A&Ox3. He reports his mood is " good" and repeatedly stated, " I want to go home." He denies recent or current suicidal ideation or depressed mood. He denies making suicidal statement, stating, " someone misunderstood me, I would never say that, I am not a coward." He reports if discharged home he would continue with oxygen and take medication for COVID infection. Patient repeatedly stated, " I don't like it here at the hospital. They do not give me water, the food is poor and I am not able to rest." He reports if discharged home he would eat and sleep better. He reports he is a retired  and looks forward to returning home to his meals. He admits to refusing selective medication x 2 days stating, " they were giving me the wrong dose of insulin and I refused vitamins." He denies refusing cardiac medications. Patient denies any recent or current thoughts of hurting self or others and denies auditory/visual hallucinations.   Case discussed with LOU Stanford who reports patient has been denying suicidal ideation and is compliant with treatment.     Received collateral from spouse Yakelin Olivera, [phone]. Spouse reports patient was agitated during their phone conversation yesterday. He appeared confused and repeatedly asked her to call their insurance company so he could be discharged. He then began yelling and stated, " Call the insurance company so I could get out of here otherwise I'll have to commit suicide." Spouse does not feel patient meant what he said and does not feel he is a suicide risk. Patient has never received psychiatric treatment and prior to yesterday has never verbalized feeling depressed or suicidal. Prior to admission to [facility] he was at baseline. He was taking his medication as directed and did nt appear depressed.  At baseline his mood is usually " okay" but he can become irritable if his routine is interrupted. Since [facility], he enjoys watching TV and looks forward to his meals. Patient drinks on average 1-2 glasses of wine with dinner.

## 2020-12-11 NOTE — PROGRESS NOTE ADULT - ASSESSMENT
This 71 y/o male with h/o cad s/p cabg , aicd, htn, dm, on eliquis now as per pt, not sure why he is on it , stated it is his heart related ( used to be on coumadin before ), reports no h/o blood clots, came to the ER brought in by his wife as he has not been feeling well for past 4 days, lack of appetite, generalized weakness and loose watery diarrhea, 3-4 time / day, no blood in stool reported, no abd. pain, no vomiting, some nausea. no sob, no cough, reports no sick contact, no travel, was not on any antibiotics recently, no cp, no dizziness. pt. reported that there has been no diarrhea in the ER for past several hours,  t max in the .7, got Tylenol and at the time of admission 97.9. no other complaints. pt's o2 sat was 90 % RA and 96 % with 2 L. pt. is COVID-19 positive .   I called pt's son Bill at [phone]  to discuss pt's status and meds but there was no answer. Will request day team to contact [place] [facility] office to inquire about pt's use of eliquis and indication so his record can be updated, pt. stated that he uses digoxin.  (04 Dec 2020 00:52)    patient confirmed with positive COVID test.   Xray of the chest with bilateral lower lobe infiltrates.     he denies sick contacts.       Impression:  COVID-19 infection   Viral pneumonia  shortness of breath  lung infiltrates  dependence on oxygen    Plan:  - completed Remdesivir IV daily.   -> thru 12/14/2020  x 10 days  - continue dexamethasone 6mg daily. x 10 days  - SLOW clinical improvement    - Continue supportive care measures  - continue Oxygenation as needed;  CONTINUE to titrate down as tolerated  - self- proning  as tolerated  - ENCOURAGED OOB to chair  - encouraged incentive spirometry    continue to trend inflammatory markers.      Will follow with you.

## 2020-12-11 NOTE — BEHAVIORAL HEALTH ASSESSMENT NOTE - SUMMARY
Patient is a 71 y/o  male, domiciled with spouse in [facility] cad s/p cabg , aicd, htn, dm, never received formal psychiatric treatment, no known suicide attempts, hospitalizations, no known complicated withdrawals, remote h/o aggression to objects, admitted to [facility] 12/3/20 for hypoxia in the context of COVID-19 infection. Psychiatry consulted for suicidal ideation. As per Dr. Seth, patient made suicidal statement to spouse.  Patient seen and evaluated. He is currently A&Ox3 and denies recent or current depressed mood or suicidal ideation. He denies auditory/visual hallucinations, thoughts of paranoia and does not appear manic or psychotic. During interview he repeatedly asked writer if I was asked to be discharge home and spoke of being unsatisfied with accomodation at [facility]. Patient appeared SOB and was seen wearing NRB oxygen. Collateral from spouse supports he is not an imminent suicide risk. Spouse reports he is intermittently confused and is irritable because he wants to come home. She does not feel he would harm himself or others at this time.   Recommend   Haldol 2.5 mg q6h prn severe agitation

## 2020-12-11 NOTE — BEHAVIORAL HEALTH ASSESSMENT NOTE - NSBHCHARTREVIEWLAB_PSY_A_CORE FT
12-11    x   |  x   |  x   ----------------------------<  x   x    |  x   |  0.69    Ca    8.6      10 Dec 2020 13:36    TPro  6.9  /  Alb  3.1<L>  /  TBili  1.1  /  DBili  0.2  /  AST  18  /  ALT  31  /  AlkPhos  71  12-11    LIVER FUNCTIONS - ( 11 Dec 2020 08:47 )  Alb: 3.1 g/dL / Pro: 6.9 g/dL / ALK PHOS: 71 U/L / ALT: 31 U/L / AST: 18 U/L / GGT: x

## 2020-12-11 NOTE — BEHAVIORAL HEALTH ASSESSMENT NOTE - NSBHCHARTREVIEWVS_PSY_A_CORE FT
ICU Vital Signs Last 24 Hrs  T(C): 35.8 (11 Dec 2020 08:57), Max: 36.8 (10 Dec 2020 15:46)  T(F): 96.5 (11 Dec 2020 08:57), Max: 98.2 (10 Dec 2020 15:46)  HR: 60 (11 Dec 2020 08:57) (59 - 61)  BP: 117/66 (11 Dec 2020 08:57) (117/66 - 135/75)  BP(mean): --  ABP: --  ABP(mean): --  RR: 18 (11 Dec 2020 12:21) (18 - 18)  SpO2: 97% (11 Dec 2020 12:21) (93% - 97%)

## 2020-12-11 NOTE — BEHAVIORAL HEALTH ASSESSMENT NOTE - NSBHCHARTREVIEWINVESTIGATE_PSY_A_CORE FT
< from: 12 Lead ECG (12.03.20 @ 22:17) >    Ventricular Rate 73 BPM    Atrial Rate 73 BPM    P-R Interval 186 ms    QRS Duration 102 ms    Q-T Interval 368 ms    QTC Calculation(Bazett) 405 ms    P Axis 67 degrees    R Axis -21 degrees    < end of copied text >

## 2020-12-11 NOTE — BEHAVIORAL HEALTH ASSESSMENT NOTE - RISK ASSESSMENT
Low Acute Suicide Risk Patient denies making suicidal statement and denies recent or current ideation. He has no prior suicide attempt and is focused on going home which he claims to enjoy.

## 2020-12-11 NOTE — PROGRESS NOTE ADULT - SUBJECTIVE AND OBJECTIVE BOX
Patient is a 70y old  Male who presents with a chief complaint of covid- 19 infection (10 Dec 2020 09:18)    Patient seen and examined at bedside.     ALLERGIES:  No Known Allergies    MEDICATIONS  (STANDING):  ALBUTerol    90 MICROgram(s) HFA Inhaler 2 Puff(s) Inhalation every 6 hours  apixaban 5 milliGRAM(s) Oral every 12 hours  ascorbic acid 500 milliGRAM(s) Oral daily  aspirin enteric coated 81 milliGRAM(s) Oral daily  atorvastatin 40 milliGRAM(s) Oral at bedtime  dexAMETHasone  Injectable 6 milliGRAM(s) IV Push daily  dextrose 40% Gel 15 Gram(s) Oral once  dextrose 5%. 1000 milliLiter(s) (50 mL/Hr) IV Continuous <Continuous>  dextrose 5%. 1000 milliLiter(s) (100 mL/Hr) IV Continuous <Continuous>  dextrose 50% Injectable 25 Gram(s) IV Push once  dextrose 50% Injectable 12.5 Gram(s) IV Push once  dextrose 50% Injectable 25 Gram(s) IV Push once  digoxin     Tablet 0.125 milliGRAM(s) Oral daily  glucagon  Injectable 1 milliGRAM(s) IntraMuscular once  insulin detemir injectable (LEVEMIR) 10 Unit(s) SubCutaneous at bedtime  insulin lispro (ADMELOG) corrective regimen sliding scale   SubCutaneous three times a day before meals  insulin lispro (ADMELOG) corrective regimen sliding scale   SubCutaneous at bedtime  lisinopril 2.5 milliGRAM(s) Oral daily  metoprolol succinate ER 25 milliGRAM(s) Oral daily  multivitamin/minerals 1 Tablet(s) Oral daily  remdesivir  IVPB 100 milliGRAM(s) IV Intermittent every 24 hours    MEDICATIONS  (PRN):  acetaminophen   Tablet .. 650 milliGRAM(s) Oral every 6 hours PRN Temp greater or equal to 38C (100.4F)  ALBUTerol    90 MICROgram(s) HFA Inhaler 2 Puff(s) Inhalation every 4 hours PRN Shortness of Breath and/or Wheezing  ondansetron Injectable 4 milliGRAM(s) IV Push every 6 hours PRN Nausea and/or Vomiting    Vital Signs Last 24 Hrs  T(F): 96.5 (11 Dec 2020 08:57), Max: 98.2 (10 Dec 2020 15:46)  HR: 60 (11 Dec 2020 08:57) (59 - 61)  BP: 117/66 (11 Dec 2020 08:57) (117/66 - 135/75)  RR: 18 (11 Dec 2020 08:57) (18 - 18)  SpO2: 96% (11 Dec 2020 08:57) (93% - 97%)  I&O's Summary    PHYSICAL EXAM:  General: NAD, A/O x 3; elderly  ENT: MMM, no thrush  Neck: Supple, No JVD  Lungs: decreased breath sounds b/l bases  Cardio: +S1/S2, No pitting edema  Abdomen: Soft, Nontender, Nondistended; Bowel sounds present  Extremities: No calf tenderness    LABS:                        16.5   19.02 )-----------( 269      ( 09 Dec 2020 12:50 )             49.2     12-11    x   |  x   |  x   ----------------------------<  x   x    |  x   |  0.69    Ca    8.6      10 Dec 2020 13:36    TPro  6.9  /  Alb  3.1  /  TBili  1.1  /  DBili  0.2  /  AST  18  /  ALT  31  /  AlkPhos  71  12-11    eGFR if : 110 mL/min/1.73M2 (12-11-20 @ 08:47)  eGFR if Non African American: 95 mL/min/1.73M2 (12-11-20 @ 08:47)    PT/INR - ( 09 Dec 2020 05:44 )   PT: 22.5 sec;   INR: 2.00 ratio      Procalcitonin, Serum: 0.08 ng/mL (12-10-20 @ 13:36)    Glucose  POCT Blood Glucose.: 126 mg/dL (11 Dec 2020 08:11)  POCT Blood Glucose.: 146 mg/dL (10 Dec 2020 21:29)  POCT Blood Glucose.: 192 mg/dL (10 Dec 2020 16:40)    Cultures  GI PCR Panel, Stool (collected 04 Dec 2020 21:55)  Source: .Stool Feces  Final Report (05 Dec 2020 02:57):    GI PCR Results: NOT detected    Culture - Stool (collected 04 Dec 2020 21:55)  Source: .Stool Feces  Final Report (06 Dec 2020 16:58):    No enteric pathogens isolated.    (Stool culture examined for Salmonella,    Shigella, Campylobacter, Aeromonas, Plesiomonas,    Vibrio, E.coli O157 and Yersinia)    Culture - Urine (collected 04 Dec 2020 15:05)  Source: .Urine Clean Catch (Midstream)  Final Report (05 Dec 2020 11:24):    <10,000 CFU/mL Normal Urogenital Emily      RADIOLOGY & ADDITIONAL TESTS:  < from: Xray Chest 1 View-PORTABLE IMMEDIATE (12.03.20 @ 22:44) >  IMPRESSION: Bilateral lower lobe infiltrates.  < end of copied text >

## 2020-12-12 NOTE — PROGRESS NOTE ADULT - SUBJECTIVE AND OBJECTIVE BOX
YULI HEADLEY    [id number]    70y      Male    CC: weakness    INTERVAL HPI/OVERNIGHT EVENTS: Pt seen and examined. no acute events o/n. on NRB+NC    REVIEW OF SYSTEMS:    CONSTITUTIONAL: No fever, weight loss  RESPIRATORY: No cough, wheezing, hemoptysis  CARDIOVASCULAR: No chest pain, palpitations  GASTROINTESTINAL: No abdominal or epigastric pain. No nausea, vomiting  NEUROLOGICAL: No headaches    Vital Signs Last 24 Hrs  T(C): 36.7 (12 Dec 2020 08:38), Max: 36.8 (11 Dec 2020 22:08)  T(F): 98 (12 Dec 2020 08:38), Max: 98.2 (11 Dec 2020 22:08)  HR: 78 (12 Dec 2020 08:38) (60 - 78)  BP: 99/64 (12 Dec 2020 08:38) (99/64 - 145/80)  BP(mean): --  RR: 21 (12 Dec 2020 08:38) (18 - 25)  SpO2: 93% (12 Dec 2020 08:38) (80% - 98%)    PHYSICAL EXAM:    GENERAL: NAD  HEENT: +EOMI  NECK: soft, supple  CHEST/LUNG: decreased breath sounds b/l; respirations non labored on NRB +NC  HEART: S1S2+, Regular rate and rhythm  ABDOMEN: Soft, Nontender, Nondistended; Bowel sounds present  SKIN: No rashes or lesions  NEURO: awake, alert    LABS:    12-12    x   |  x   |  x   ----------------------------<  x   x    |  x   |  0.63    Ca    8.6      10 Dec 2020 13:36    TPro  7.2  /  Alb  3.0<L>  /  TBili  1.2  /  DBili  0.2  /  AST  21  /  ALT  28  /  AlkPhos  87  12-12            MEDICATIONS  (STANDING):  ALBUTerol    90 MICROgram(s) HFA Inhaler 2 Puff(s) Inhalation every 6 hours  apixaban 5 milliGRAM(s) Oral every 12 hours  ascorbic acid 500 milliGRAM(s) Oral daily  aspirin enteric coated 81 milliGRAM(s) Oral daily  atorvastatin 40 milliGRAM(s) Oral at bedtime  dexAMETHasone  Injectable 6 milliGRAM(s) IV Push daily  dextrose 40% Gel 15 Gram(s) Oral once  dextrose 5%. 1000 milliLiter(s) (50 mL/Hr) IV Continuous <Continuous>  dextrose 5%. 1000 milliLiter(s) (100 mL/Hr) IV Continuous <Continuous>  dextrose 50% Injectable 25 Gram(s) IV Push once  dextrose 50% Injectable 12.5 Gram(s) IV Push once  dextrose 50% Injectable 25 Gram(s) IV Push once  digoxin     Tablet 0.125 milliGRAM(s) Oral daily  glucagon  Injectable 1 milliGRAM(s) IntraMuscular once  insulin detemir injectable (LEVEMIR) 10 Unit(s) SubCutaneous at bedtime  insulin lispro (ADMELOG) corrective regimen sliding scale   SubCutaneous three times a day before meals  insulin lispro (ADMELOG) corrective regimen sliding scale   SubCutaneous at bedtime  lisinopril 2.5 milliGRAM(s) Oral daily  metoprolol succinate ER 25 milliGRAM(s) Oral daily  multivitamin/minerals 1 Tablet(s) Oral daily  remdesivir  IVPB 100 milliGRAM(s) IV Intermittent every 24 hours    MEDICATIONS  (PRN):  acetaminophen   Tablet .. 650 milliGRAM(s) Oral every 6 hours PRN Temp greater or equal to 38C (100.4F)  ALBUTerol    90 MICROgram(s) HFA Inhaler 2 Puff(s) Inhalation every 4 hours PRN Shortness of Breath and/or Wheezing  haloperidol    Injectable 2.5 milliGRAM(s) IntraMuscular every 6 hours PRN agitation  ondansetron Injectable 4 milliGRAM(s) IV Push every 6 hours PRN Nausea and/or Vomiting      RADIOLOGY & ADDITIONAL TESTS:

## 2020-12-12 NOTE — PROGRESS NOTE ADULT - ASSESSMENT
70y/oM PMH HTN, HLD, DM, CAD s/p CABG, AICD presenting with weakness, diarrhea, anorexia x4 days, admitted with COVID-19 PNA      70y/oM PMH HTN, HLD, DM, CAD s/p CABG, AICD presenting with weakness, diarrhea, anorexia x4 days, admitted with COVID-19 PNA     Acute hypoxic respiratory failure   2/2 COVID-19 PNA   -cont     Thrombocytopenia, Lymphopenia     HTN  -cont metoprolol, lisinopril     DM2   -HgbA1c 6.3 70y/oM PMH HTN, HLD, DM, CAD s/p CABG, AICD presenting with weakness, diarrhea, anorexia x4 days, admitted with COVID-19 PNA     Acute hypoxic respiratory failure   2/2 COVID-19 PNA   -ID recs appreciated   -cont remdesivir through 12/14  -cont dexamethasone x 10days   -cont supportive care   -prone as tolerated   -wean supplemental O2 as tolerated, seen on NRB +NC 12/12    Thrombocytopenia, Lymphopenia   -likely 2/2 viral infection     CAD s/p CABG  HTN, HLD  -cont metoprolol, lisinopril, statin, asa    DM2   -HgbA1c 6.3   -iSS  -Levemir 10uqhs    Suicidal thoughts   -pt currently denies  -psych recs appreciated  -on enhanced supervision ; no psych contraindications to d/c    paroxysmal afib   -cont eliquis, metoprolol, digoxin     DVT ppx: Eliquis        70y/oM PMH HTN, HLD, DM, CAD s/p CABG, AICD presenting with weakness, diarrhea, anorexia x4 days, admitted with COVID-19 PNA     Acute hypoxic respiratory failure   2/2 COVID-19 PNA   -ID recs appreciated   -cont remdesivir through 12/14  -cont dexamethasone x 10days   -cont supportive care   -prone as tolerated   -wean supplemental O2 as tolerated, seen on NRB +NC 12/12    Thrombocytopenia, Lymphopenia   -likely 2/2 viral infection     CAD s/p CABG  HTN, HLD  -cont metoprolol, lisinopril, statin, asa    DM2   -HgbA1c 6.3   -iSS  -Levemir 10uqhs    Suicidal thoughts   -pt currently denies  -psych recs appreciated  -on enhanced supervision ; no psych contraindications to d/c    paroxysmal afib   -cont eliquis, metoprolol, digoxin     DVT ppx: Eliquis     Dispo: pending clinical improvement, still requiring NRB/NC     Pt's wife, Yakelin [phone] updated

## 2020-12-13 NOTE — PROGRESS NOTE ADULT - SUBJECTIVE AND OBJECTIVE BOX
YULI HEADLEY    [id number]    70y      Male    CC: weakness    INTERVAL HPI/OVERNIGHT EVENTS: pt seen and examined. on NRB. c/o feeling of dry mouth    REVIEW OF SYSTEMS:    CONSTITUTIONAL: No fever, weight loss  RESPIRATORY: No wheezing, hemoptysis  CARDIOVASCULAR: No chest pain, palpitations  GASTROINTESTINAL: No abdominal or epigastric pain. No nausea, vomiting  NEUROLOGICAL: No headaches    Vital Signs Last 24 Hrs  T(C): 36.2 (13 Dec 2020 08:35), Max: 36.6 (12 Dec 2020 17:51)  T(F): 97.1 (13 Dec 2020 08:35), Max: 97.9 (12 Dec 2020 17:51)  HR: 75 (13 Dec 2020 08:35) (60 - 75)  BP: 122/83 (13 Dec 2020 08:35) (104/71 - 122/83)  BP(mean): --  RR: 18 (13 Dec 2020 08:35) (18 - 21)  SpO2: 100% (13 Dec 2020 08:35) (94% - 100%)    PHYSICAL EXAM:    GENERAL: NAD  HEENT: +EOMI  NECK: soft, supple  CHEST/LUNG: decreased lung sounds b/l; respirations non-labored on NRB  HEART: S1S2+, Regular rate and rhythm  ABDOMEN: Soft, Nontender, Nondistended; Bowel sounds present  SKIN: No rashes or lesions  NEURO: AAOX3, no focal deficits      LABS:    12-12    x   |  x   |  x   ----------------------------<  x   x    |  x   |  0.63      TPro  7.2  /  Alb  3.0<L>  /  TBili  1.2  /  DBili  0.2  /  AST  21  /  ALT  28  /  AlkPhos  87  12-12            MEDICATIONS  (STANDING):  ALBUTerol    90 MICROgram(s) HFA Inhaler 2 Puff(s) Inhalation every 6 hours  apixaban 5 milliGRAM(s) Oral every 12 hours  ascorbic acid 500 milliGRAM(s) Oral daily  aspirin enteric coated 81 milliGRAM(s) Oral daily  atorvastatin 40 milliGRAM(s) Oral at bedtime  dexAMETHasone  Injectable 6 milliGRAM(s) IV Push daily  dextrose 40% Gel 15 Gram(s) Oral once  dextrose 5%. 1000 milliLiter(s) (50 mL/Hr) IV Continuous <Continuous>  dextrose 5%. 1000 milliLiter(s) (100 mL/Hr) IV Continuous <Continuous>  dextrose 50% Injectable 25 Gram(s) IV Push once  dextrose 50% Injectable 12.5 Gram(s) IV Push once  dextrose 50% Injectable 25 Gram(s) IV Push once  digoxin     Tablet 0.125 milliGRAM(s) Oral daily  glucagon  Injectable 1 milliGRAM(s) IntraMuscular once  guaiFENesin  milliGRAM(s) Oral every 12 hours  insulin detemir injectable (LEVEMIR) 10 Unit(s) SubCutaneous at bedtime  insulin lispro (ADMELOG) corrective regimen sliding scale   SubCutaneous three times a day before meals  insulin lispro (ADMELOG) corrective regimen sliding scale   SubCutaneous at bedtime  lisinopril 2.5 milliGRAM(s) Oral daily  metoprolol succinate ER 25 milliGRAM(s) Oral daily  multivitamin/minerals 1 Tablet(s) Oral daily  remdesivir  IVPB 100 milliGRAM(s) IV Intermittent every 24 hours    MEDICATIONS  (PRN):  acetaminophen   Tablet .. 650 milliGRAM(s) Oral every 6 hours PRN Temp greater or equal to 38C (100.4F)  ALBUTerol    90 MICROgram(s) HFA Inhaler 2 Puff(s) Inhalation every 4 hours PRN Shortness of Breath and/or Wheezing  haloperidol    Injectable 2.5 milliGRAM(s) IntraMuscular every 6 hours PRN agitation  ondansetron Injectable 4 milliGRAM(s) IV Push every 6 hours PRN Nausea and/or Vomiting      RADIOLOGY & ADDITIONAL TESTS:

## 2020-12-13 NOTE — PROGRESS NOTE ADULT - ASSESSMENT
70y/oM PMH HTN, HLD, DM, CAD s/p CABG, AICD presenting with weakness, diarrhea, anorexia x4 days, admitted with COVID-19 PNA     Acute hypoxic respiratory failure   2/2 COVID-19 PNA   -ID recs appreciated   -cont remdesivir through 12/14  -cont dexamethasone x 10days   -cont supportive care   -prone as tolerated   -wean supplemental O2 as tolerated, seen on NRB 12/13    Thrombocytopenia, Lymphopenia   -likely 2/2 viral infection     CAD s/p CABG  HTN, HLD  -cont metoprolol, lisinopril, statin, asa    DM2   -HgbA1c 6.3   -iSS  -Levemir 10uqhs    Suicidal thoughts   -pt currently denies  -psych recs appreciated  -on enhanced supervision ; no psych contraindications to d/c    paroxysmal afib   -cont eliquis, metoprolol, digoxin     DVT ppx: Eliquis     Dispo: pending clinical improvement, still requiring NRB         70y/oM PMH HTN, HLD, DM, CAD s/p CABG, AICD presenting with weakness, diarrhea, anorexia x4 days, admitted with COVID-19 PNA     Acute hypoxic respiratory failure   2/2 COVID-19 PNA   -ID recs appreciated   -cont remdesivir through 12/14  -cont dexamethasone x 10days   -cont supportive care   -prone as tolerated   -wean supplemental O2 as tolerated, seen on NRB 12/13    Thrombocytopenia, Lymphopenia   -likely 2/2 viral infection     CAD s/p CABG  HTN, HLD  -cont metoprolol, lisinopril, statin, asa    DM2   -HgbA1c 6.3   -iSS  -Levemir 10uqhs    Suicidal thoughts   -pt currently denies  -psych recs appreciated  -on enhanced supervision ; no psych contraindications to d/c    paroxysmal afib   -cont eliquis, metoprolol, digoxin     DVT ppx: Eliquis     Dispo: pending clinical improvement, still requiring NRB    pt's wife, Yakelin, updated

## 2020-12-14 NOTE — CHART NOTE - NSCHARTNOTEFT_GEN_A_CORE
Source: Patient [ ]  Family [ ]   other [ x]    Current Diet: Diet, DASH/TLC:   Sodium & Cholesterol Restricted  Consistent Carbohydrate {Evening Snack} (CSTCHOSN) (12-04-20 @ 01:22)    Current Weight:   (12/3) 162 lbs    % Weight Change no new weight to assess, will continue to monitor.  No edema noted.     Pertinent Medications: MEDICATIONS  (STANDING):  ALBUTerol    90 MICROgram(s) HFA Inhaler 2 Puff(s) Inhalation every 6 hours  apixaban 5 milliGRAM(s) Oral every 12 hours  ascorbic acid 500 milliGRAM(s) Oral daily  aspirin enteric coated 81 milliGRAM(s) Oral daily  atorvastatin 40 milliGRAM(s) Oral at bedtime  dexAMETHasone  Injectable 6 milliGRAM(s) IV Push daily  dextrose 40% Gel 15 Gram(s) Oral once  dextrose 5%. 1000 milliLiter(s) (100 mL/Hr) IV Continuous <Continuous>  dextrose 5%. 1000 milliLiter(s) (50 mL/Hr) IV Continuous <Continuous>  dextrose 50% Injectable 25 Gram(s) IV Push once  dextrose 50% Injectable 12.5 Gram(s) IV Push once  dextrose 50% Injectable 25 Gram(s) IV Push once  digoxin     Tablet 0.125 milliGRAM(s) Oral daily  glucagon  Injectable 1 milliGRAM(s) IntraMuscular once  guaiFENesin  milliGRAM(s) Oral every 12 hours  insulin detemir injectable (LEVEMIR) 10 Unit(s) SubCutaneous at bedtime  insulin lispro (ADMELOG) corrective regimen sliding scale   SubCutaneous three times a day before meals  insulin lispro (ADMELOG) corrective regimen sliding scale   SubCutaneous at bedtime  lisinopril 2.5 milliGRAM(s) Oral daily  metoprolol succinate ER 25 milliGRAM(s) Oral daily  multivitamin/minerals 1 Tablet(s) Oral daily  remdesivir  IVPB 100 milliGRAM(s) IV Intermittent every 24 hours    MEDICATIONS  (PRN):  acetaminophen   Tablet .. 650 milliGRAM(s) Oral every 6 hours PRN Temp greater or equal to 38C (100.4F)  ALBUTerol    90 MICROgram(s) HFA Inhaler 2 Puff(s) Inhalation every 4 hours PRN Shortness of Breath and/or Wheezing  haloperidol    Injectable 2.5 milliGRAM(s) IntraMuscular every 6 hours PRN agitation  ondansetron Injectable 4 milliGRAM(s) IV Push every 6 hours PRN Nausea and/or Vomiting    Pertinent Labs: CBC Full  -  ( 14 Dec 2020 07:23 )  WBC Count : 25.72 K/uL  RBC Count : 5.74 M/uL  Hemoglobin : 18.7 g/dL  Hematocrit : 56.0 %  Platelet Count - Automated : 248 K/uL  Mean Cell Volume : 97.6 fl  Mean Cell Hemoglobin : 32.6 pg  Mean Cell Hemoglobin Concentration : 33.4 gm/dL  Auto Neutrophil # : 22.20 K/uL  Auto Lymphocyte # : 0.89 K/uL  Auto Monocyte # : 1.58 K/uL  Auto Eosinophil # : 0.02 K/uL  Auto Basophil # : 0.15 K/uL  Auto Neutrophil % : 86.3 %  Auto Lymphocyte % : 3.5 %  Auto Monocyte % : 6.1 %  Auto Eosinophil % : 0.1 %  Auto Basophil % : 0.6 %  12-14 Na138 mmol/L Glu 93 mg/dL K+ 5.4 mmol/L<H> Cr  0.82 mg/dL BUN 40.0 mg/dL<H> Phos n/a   Alb 3.0 g/dL<L> PAB n/a       Skin: no skin breakdown noted     Current Nutrition Diagnosis: Pt remains at nutrition risk secondary to increased nutrient needs related to increased physiological demand for nutrient as evidenced by pt with acute hypoxic respiratory failure due to COVID.  Pt continues to receive DASH/TLC, cons cho meal plan.  Current po intake no documented, however diarrhea improved per physician notes.  Pt receiving non rebreather and instructed to self prone as tolerated.     Recommendations:   1. RX: Glucerna TID   2. RX: Vit D supplementation daily  3. Continue with MVI and Vit C daily  4. Monitor daily wts     Monitoring and Evaluation:   [x ] PO intake [ x] Tolerance to diet prescription [X] Weights  [X] Follow up per protocol [X] Labs:

## 2020-12-14 NOTE — PROGRESS NOTE ADULT - ASSESSMENT
70y/oM PMH HTN, HLD, DM, CAD s/p CABG, AICD presenting with weakness, diarrhea, anorexia x4 days, admitted with COVID-19 PNA     Acute hypoxic respiratory failure   2/2 COVID-19 PNA   -ID recs appreciated   -cont remdesivir through 12/14  -s/p dexamethasone  -cont supportive care   -prone as tolerated   -wean supplemental O2 as tolerated, seen on NRB 12/14    Thrombocytopenia, Lymphopenia   -likely 2/2 viral infection   -thrombocytopenia improved    Hyperkalemia   -K 5.4 this am, f/u repeat BMP   -if remains high, will give [facility]     CAD s/p CABG  HTN, HLD  -cont metoprolol, lisinopril, statin, asa    DM2   -HgbA1c 6.3   -iSS  -Levemir 10uqhs    Suicidal thoughts   -pt currently denies  -psych recs appreciated  -on enhanced supervision ; no psych contraindications to d/c    paroxysmal afib   -cont eliquis, metoprolol, digoxin     DVT ppx: Eliquis     Dispo: pending clinical improvement, still requiring NRB

## 2020-12-14 NOTE — PROGRESS NOTE ADULT - ASSESSMENT
This 71 y/o male with h/o cad s/p cabg , aicd, htn, dm, on eliquis now as per pt, not sure why he is on it , stated it is his heart related ( used to be on coumadin before ), reports no h/o blood clots, came to the ER brought in by his wife as he has not been feeling well for past 4 days, lack of appetite, generalized weakness and loose watery diarrhea, 3-4 time / day, no blood in stool reported, no abd. pain, no vomiting, some nausea. no sob, no cough, reports no sick contact, no travel, was not on any antibiotics recently, no cp, no dizziness. pt. reported that there has been no diarrhea in the ER for past several hours,  t max in the .7, got Tylenol and at the time of admission 97.9. no other complaints. pt's o2 sat was 90 % RA and 96 % with 2 L. pt. is COVID-19 positive .   I called pt's son Bill at [phone]  to discuss pt's status and meds but there was no answer. Will request day team to contact [facility] to inquire about pt's use of eliquis and indication so his record can be updated, pt. stated that he uses digoxin.  (04 Dec 2020 00:52)    patient confirmed with positive COVID test.   Xray of the chest with bilateral lower lobe infiltrates.     he denies sick contacts.       Impression:  COVID-19 infection   Viral pneumonia  shortness of breath  lung infiltrates  dependence on oxygen    Plan:  - completed Remdesivir IV daily.   -> thru 12/14/2020  x 10 days  - continue dexamethasone 6mg daily. x 10 days  - SLOW clinical improvement    - Continue supportive care measures  - continue Oxygenation as needed;  CONTINUE to titrate down as tolerated  - self- proning  as tolerated  - ENCOURAGED OOB to chair  - encouraged incentive spirometry    continue to trend inflammatory markers.          No further specific infectious disease recommendations. Will be available as needed.

## 2020-12-14 NOTE — PROGRESS NOTE ADULT - SUBJECTIVE AND OBJECTIVE BOX
YULI HEADLEY    [id number]    70y      Male    CC: weakness    INTERVAL HPI/OVERNIGHT EVENTS: Pt seen and examined. still on NRB. denies cp, abd pain, n/v    REVIEW OF SYSTEMS:    CONSTITUTIONAL: No fever, weight loss  RESPIRATORY: No cough, wheezing, hemoptysis  CARDIOVASCULAR: No chest pain, palpitations  GASTROINTESTINAL: No abdominal or epigastric pain. No nausea, vomiting  NEUROLOGICAL: No headaches    Vital Signs Last 24 Hrs  T(C): 36.6 (14 Dec 2020 07:49), Max: 36.8 (14 Dec 2020 06:59)  T(F): 97.9 (14 Dec 2020 07:49), Max: 98.2 (14 Dec 2020 06:59)  HR: 78 (14 Dec 2020 07:49) (71 - 82)  BP: 106/72 (14 Dec 2020 07:49) (106/72 - 126/72)  BP(mean): --  RR: 20 (14 Dec 2020 07:49) (20 - 20)  SpO2: 95% (14 Dec 2020 07:49) (95% - 97%)    PHYSICAL EXAM:    GENERAL: NAD  HEENT: +EOMI  NECK: warm, dry  CHEST/LUNG: decreased lung sounds b/l; non-labored respirations on NRB  HEART: S1S2+, Regular rate and rhythm; No murmurs, rubs, or gallops  ABDOMEN: Soft, Nontender, Nondistended; Bowel sounds present  SKIN: warm, dry  NEURO: Awake, alert    LABS:                        18.7   25.72 )-----------( 248      ( 14 Dec 2020 07:23 )             56.0     12-14    138  |  100  |  40.0<H>  ----------------------------<  93  5.4<H>   |  27.0  |  0.82    Ca    9.2      14 Dec 2020 07:23  Mg     2.1     12-13    TPro  7.4  /  Alb  3.0<L>  /  TBili  1.2  /  DBili  0.3  /  AST  15  /  ALT  21  /  AlkPhos  97  12-14            MEDICATIONS  (STANDING):  ALBUTerol    90 MICROgram(s) HFA Inhaler 2 Puff(s) Inhalation every 6 hours  apixaban 5 milliGRAM(s) Oral every 12 hours  ascorbic acid 500 milliGRAM(s) Oral daily  aspirin enteric coated 81 milliGRAM(s) Oral daily  atorvastatin 40 milliGRAM(s) Oral at bedtime  dexAMETHasone  Injectable 6 milliGRAM(s) IV Push daily  dextrose 40% Gel 15 Gram(s) Oral once  dextrose 5%. 1000 milliLiter(s) (100 mL/Hr) IV Continuous <Continuous>  dextrose 5%. 1000 milliLiter(s) (50 mL/Hr) IV Continuous <Continuous>  dextrose 50% Injectable 25 Gram(s) IV Push once  dextrose 50% Injectable 12.5 Gram(s) IV Push once  dextrose 50% Injectable 25 Gram(s) IV Push once  digoxin     Tablet 0.125 milliGRAM(s) Oral daily  glucagon  Injectable 1 milliGRAM(s) IntraMuscular once  guaiFENesin  milliGRAM(s) Oral every 12 hours  insulin detemir injectable (LEVEMIR) 10 Unit(s) SubCutaneous at bedtime  insulin lispro (ADMELOG) corrective regimen sliding scale   SubCutaneous three times a day before meals  insulin lispro (ADMELOG) corrective regimen sliding scale   SubCutaneous at bedtime  lisinopril 2.5 milliGRAM(s) Oral daily  metoprolol succinate ER 25 milliGRAM(s) Oral daily  multivitamin/minerals 1 Tablet(s) Oral daily  remdesivir  IVPB 100 milliGRAM(s) IV Intermittent every 24 hours    MEDICATIONS  (PRN):  acetaminophen   Tablet .. 650 milliGRAM(s) Oral every 6 hours PRN Temp greater or equal to 38C (100.4F)  ALBUTerol    90 MICROgram(s) HFA Inhaler 2 Puff(s) Inhalation every 4 hours PRN Shortness of Breath and/or Wheezing  haloperidol    Injectable 2.5 milliGRAM(s) IntraMuscular every 6 hours PRN agitation  ondansetron Injectable 4 milliGRAM(s) IV Push every 6 hours PRN Nausea and/or Vomiting      RADIOLOGY & ADDITIONAL TESTS:

## 2020-12-14 NOTE — PROGRESS NOTE ADULT - SUBJECTIVE AND OBJECTIVE BOX
[facility]  INFECTIOUS DISEASES AND INTERNAL MEDICINE at [place]  =======================================================  Eliezer Greenfield MD  Diplomates American Board of Internal Medicine and Infectious Diseases  Tel  [phone]  Fax [phone]  =======================================================    [id number]  YULI HEADLEY   follow up: COVID-19    still on NRB  confused this AM,.     =======================================================    REVIEW OF SYSTEMS:  CONSTITUTIONAL:  No Fever or chills  HEENT:  No diplopia or blurred vision.  No earache, sore throat or runny nose.  CARDIOVASCULAR:  No pressure, squeezing, strangling, tightness, heaviness or aching about the chest, neck, axilla or epigastrium.  RESPIRATORY:  POSITIVE cough, shortness of breath  GASTROINTESTINAL:  No nausea, vomiting or diarrhea.  GENITOURINARY:  No dysuria, frequency or urgency. No Blood in urine  MUSCULOSKELETAL:  no joint aches, no muscle pain  SKIN:  No change in skin, hair or nails.  NEUROLOGIC:  No Headaches, seizures or weakness.  PSYCHIATRIC:  No disorder of thought or mood.  ENDOCRINE:  No heat or cold intolerance  HEMATOLOGICAL:  No easy bruising or bleeding.    =======================================================  Allergies  No Known Allergies    ======================================================  Physical Exam:  ============     General:  No acute distress.  Eye: Pupils are equal, round and reactive to light, Extraocular movements are intact, Normal conjunctiva.  HENT: Normocephalic, Oral mucosa is DRY, No pharyngeal erythema, No sinus tenderness.  Neck: Supple, No lymphadenopathy.  Respiratory: Lungs with COARSE breath sounds bilateral   Cardiovascular: Normal rate, Regular rhythm,   Gastrointestinal: Soft, Non-tender, Non-distended, Normal bowel sounds.  Genitourinary: No costovertebral angle tenderness.  Lymphatics: No lymphadenopathy neck,   Musculoskeletal: Normal range of motion, Normal strength.  Integumentary: No rash.  Neurologic: Alert, Oriented, No focal deficits, Cranial Nerves II-XII are grossly intact.  Psychiatric: Appropriate mood & affect.    =======================================================  Vitals:  ============  T(F): 97.9 (14 Dec 2020 07:49), Max: 98.2 (14 Dec 2020 06:59)  HR: 78 (14 Dec 2020 07:49)  BP: 106/72 (14 Dec 2020 07:49)  RR: 20 (14 Dec 2020 07:49)  SpO2: 95% (14 Dec 2020 07:49) (95% - 97%)  temp max in last 48H T(F): , Max: 98.2 (12-14-20 @ 06:59)    =======================================================       Other medications:  ALBUTerol    90 MICROgram(s) HFA Inhaler 2 Puff(s) Inhalation every 6 hours  apixaban 5 milliGRAM(s) Oral every 12 hours  ascorbic acid 500 milliGRAM(s) Oral daily  aspirin enteric coated 81 milliGRAM(s) Oral daily  atorvastatin 40 milliGRAM(s) Oral at bedtime  cholecalciferol 1000 Unit(s) Oral daily  dextrose 40% Gel 15 Gram(s) Oral once  dextrose 5%. 1000 milliLiter(s) IV Continuous <Continuous>  dextrose 5%. 1000 milliLiter(s) IV Continuous <Continuous>  dextrose 50% Injectable 25 Gram(s) IV Push once  dextrose 50% Injectable 12.5 Gram(s) IV Push once  dextrose 50% Injectable 25 Gram(s) IV Push once  digoxin     Tablet 0.125 milliGRAM(s) Oral daily  glucagon  Injectable 1 milliGRAM(s) IntraMuscular once  guaiFENesin  milliGRAM(s) Oral every 12 hours  insulin detemir injectable (LEVEMIR) 10 Unit(s) SubCutaneous at bedtime  insulin lispro (ADMELOG) corrective regimen sliding scale   SubCutaneous three times a day before meals  insulin lispro (ADMELOG) corrective regimen sliding scale   SubCutaneous at bedtime  lisinopril 2.5 milliGRAM(s) Oral daily  metoprolol succinate ER 25 milliGRAM(s) Oral daily  multivitamin/minerals 1 Tablet(s) Oral daily      =======================================================  Labs:                        18.7   25.72 )-----------( 248      ( 14 Dec 2020 07:23 )             56.0      12-14    138  |  100  |  40.0<H>  ----------------------------<  93  5.4<H>   |  27.0  |  0.82    Ca    9.2      14 Dec 2020 07:23  Mg     2.1     12-13    TPro  7.4  /  Alb  3.0<L>  /  TBili  1.2  /  DBili  0.3  /  AST  15  /  ALT  21  /  AlkPhos  97  12-14      GI PCR Panel, Stool (collected 12-04-20 @ 21:55)  Source: .Stool Feces  Final Report (12-05-20 @ 02:57):    GI PCR Results: NOT detected    *******Please Note:*******    GI panel PCR evaluates for:    Campylobacter, Plesiomonas shigelloides, Salmonella,    Vibrio, Yersinia enterocolitica, Enteroaggregative    Escherichia coli (EAEC), Enteropathogenic E.coli (EPEC),    Enterotoxigenic E. coli (ETEC) lt/st, Shiga-like    toxin-producing E. coli (STEC) stx1/stx2,    Shigella/ Enteroinvasive E. coli (EIEC), Cryptosporidium,    Cyclospora cayetanensis, Entamoeba histolytica,    Giardia lamblia, Adenovirus F 40/41, Astrovirus,    Norovirus GI/GII, Rotavirus A, Sapovirus    Culture - Stool (collected 12-04-20 @ 21:55)  Source: .Stool Feces  Final Report (12-06-20 @ 16:58):    No enteric pathogens isolated.    (Stool culture examined for Salmonella,    Shigella, Campylobacter, Aeromonas, Plesiomonas,    Vibrio, E.coli O157 and Yersinia)    Culture - Urine (collected 12-04-20 @ 15:05)  Source: .Urine Clean Catch (Midstream)  Final Report (12-05-20 @ 11:24):    <10,000 CFU/mL Normal Urogenital Emily    Culture - Blood (collected 12-04-20 @ 00:54)  Source: .Blood Blood-Peripheral  Final Report (12-09-20 @ 01:00):    No growth at 5 days.    Culture - Blood (collected 12-04-20 @ 00:53)  Source: .Blood Blood-Peripheral  Final Report (12-09-20 @ 01:00):    No growth at 5 days.      Creatinine, Serum: 0.82 mg/dL (12-14-20 @ 07:23)  Creatinine, Serum: 0.59 mg/dL (12-13-20 @ 09:50)  Creatinine, Serum: 0.63 mg/dL (12-12-20 @ 06:18)  Creatinine, Serum: 0.69 mg/dL (12-11-20 @ 08:47)  Creatinine, Serum: 0.87 mg/dL (12-10-20 @ 13:36)  Creatinine, Serum: 0.85 mg/dL (12-10-20 @ 13:35)    Procalcitonin, Serum: 0.04 ng/mL (12-13-20 @ 09:50)  Procalcitonin, Serum: 0.08 ng/mL (12-10-20 @ 13:36)  Procalcitonin, Serum: 0.11 ng/mL (12-03-20 @ 20:28)    D-Dimer Assay, Quantitative: <150 ng/mL DDU (12-09-20 @ 16:24)  D-Dimer Assay, Quantitative: <150 ng/mL DDU (12-05-20 @ 06:19)    Ferritin, Serum: 978 ng/mL (12-13-20 @ 09:50)  Ferritin, Serum: 823 ng/mL (12-10-20 @ 13:36)  Ferritin, Serum: 1050 ng/mL (12-05-20 @ 06:19)  Ferritin, Serum: 1275 ng/mL (12-03-20 @ 20:28)    C-Reactive Protein, Serum: 1.57 mg/dL (12-13-20 @ 09:50)  C-Reactive Protein, Serum: 1.24 mg/dL (12-10-20 @ 13:36)  C-Reactive Protein, Serum: 0.90 mg/dL (12-05-20 @ 06:19)  C-Reactive Protein, Serum: 1.22 mg/dL (12-03-20 @ 20:28)    WBC Count: 25.72 K/uL (12-14-20 @ 07:23)  WBC Count: 23.22 K/uL (12-13-20 @ 09:50)    SARS-CoV-2: Detected (12-03-20 @ 20:35)  Rapid RVP Result: Detected (12-03-20 @ 20:35)    COVID-19 IgG Antibody Index: <0.10 Index (12-04-20 @ 13:11)  COVID-19 IgG Antibody Interpretation: Negative (12-04-20 @ 13:11)    Lactate Dehydrogenase, Serum: 390 U/L (12-13-20 @ 09:50)  Lactate Dehydrogenase, Serum: 324 U/L (12-10-20 @ 13:36)    Alkaline Phosphatase, Serum: 97 U/L (12-14-20 @ 07:23)  Alkaline Phosphatase, Serum: 92 U/L (12-13-20 @ 09:50)  Alkaline Phosphatase, Serum: 84 U/L (12-13-20 @ 09:50)  Alkaline Phosphatase, Serum: 87 U/L (12-12-20 @ 06:18)  Alkaline Phosphatase, Serum: 71 U/L (12-11-20 @ 08:47)  Alanine Aminotransferase (ALT/SGPT): 21 U/L (12-14-20 @ 07:23)  Alanine Aminotransferase (ALT/SGPT): 23 U/L (12-13-20 @ 09:50)  Alanine Aminotransferase (ALT/SGPT): 23 U/L (12-13-20 @ 09:50)  Alanine Aminotransferase (ALT/SGPT): 28 U/L (12-12-20 @ 06:18)  Alanine Aminotransferase (ALT/SGPT): 31 U/L (12-11-20 @ 08:47)  Aspartate Aminotransferase (AST/SGOT): 15 U/L (12-14-20 @ 07:23)  Aspartate Aminotransferase (AST/SGOT): 19 U/L (12-13-20 @ 09:50)  Aspartate Aminotransferase (AST/SGOT): 18 U/L (12-13-20 @ 09:50)  Aspartate Aminotransferase (AST/SGOT): 21 U/L (12-12-20 @ 06:18)  Aspartate Aminotransferase (AST/SGOT): 18 U/L (12-11-20 @ 08:47)  Bilirubin Total, Serum: 1.2 mg/dL (12-14-20 @ 07:23)  Bilirubin Total, Serum: 1.3 mg/dL (12-13-20 @ 09:50)  Bilirubin Total, Serum: 1.3 mg/dL (12-13-20 @ 09:50)  Bilirubin Total, Serum: 1.2 mg/dL (12-12-20 @ 06:18)  Bilirubin Total, Serum: 1.1 mg/dL (12-11-20 @ 08:47)  Bilirubin Direct, Serum: 0.3 mg/dL (12-14-20 @ 07:23)  Bilirubin Direct, Serum: 0.3 mg/dL (12-13-20 @ 09:50)  Bilirubin Direct, Serum: 0.2 mg/dL (12-12-20 @ 06:18)  Bilirubin Direct, Serum: 0.2 mg/dL (12-11-20 @ 08:47)

## 2020-12-14 NOTE — CHART NOTE - NSCHARTNOTEFT_GEN_A_CORE
Potassium 5.8 on repeat will d/c lisinopril, give lokelma 10g once and lasix 40mg IVP x1, recheck BMP in AM Potassium 5.8 on repeat will d/c lisinopril, give lokelma 10g once and lasix 40mg IVP x1    Will order STAT EKG, on cardiac monitor, Give insulin 10U IV and D50, repeat potassium @ 23:00

## 2020-12-15 NOTE — PROGRESS NOTE ADULT - ASSESSMENT
This 69 y/o male with h/o cad s/p cabg , aicd, htn, dm, on eliquis now as per pt, not sure why he is on it , stated it is his heart related ( used to be on coumadin before ), reports no h/o blood clots, came to the ER brought in by his wife as he has not been feeling well for past 4 days, lack of appetite, generalized weakness and loose watery diarrhea, 3-4 time / day, no blood in stool reported, no abd. pain, no vomiting, some nausea. no sob, no cough, reports no sick contact, no travel, was not on any antibiotics recently, no cp, no dizziness. pt. reported that there has been no diarrhea in the ER for past several hours,  t max in the .7, got Tylenol and at the time of admission 97.9. no other complaints. pt's o2 sat was 90 % RA and 96 % with 2 L. pt. is COVID-19 positive .   I called pt's son Bill at [phone]  to discuss pt's status and meds but there was no answer. Will request day team to contact [place] [facility]ology office to inquire about pt's use of eliquis and indication so his record can be updated, pt. stated that he uses digoxin.  (04 Dec 2020 00:52)    patient confirmed with positive COVID test.   Xray of the chest with bilateral lower lobe infiltrates.     he denies sick contacts.       Impression:  COVID-19 infection   Viral pneumonia  shortness of breath  lung infiltrates  dependence on oxygen    Plan:  - completed Remdesivir IV daily.   -> thru 12/14/2020  x 10 days  - continue dexamethasone 6mg daily. x 10 days  - SLOW clinical improvement    - Continue supportive care measures  - continue Oxygenation as needed;  CONTINUE to titrate down as tolerated  - self- proning  as tolerated  - ENCOURAGED OOB to chair  - encouraged incentive spirometry    continue to trend inflammatory markers.     No further specific infectious disease recommendations. Will be available as needed.

## 2020-12-15 NOTE — PROGRESS NOTE ADULT - ASSESSMENT
70y/oM PMH HTN, HLD, DM, CAD s/p CABG, AICD presenting with weakness, diarrhea, anorexia x4 days, admitted with COVID-19 PNA     1. Acute hypoxic respiratory failure 2/2 COVID-19 PNA   -ID recs appreciated   -Completed remdesivir x 10 days  -C/w dexamethasone  -Slow clinical improvement, still on NRB, wean o2 as able  -Repeated CXR this am, no signifcant new changes, ddimer also repeated and wnl at 220 (prior ddimer 150)  -Cont supportive care, prone as tolerated     2.Thrombocytopenia, Lymphopenia   -Likely 2/2 viral infection   -Resolved    3. Hyperkalemia   -Overnight w/ elevated K of 5.8. Lisinopril dced, and s/p insulin, dextrose and lokelma  -This am, K 5.0, monitor BMP    4. CAD s/p CABG, HTN, HLD  -cont metoprolol, lisinopril, statin, asa    5.DM2   -HgbA1c 6.3   -ISS  -Levemir 10uqhs    6. Suicidal thoughts   -Pt currently denies  -Psych recs appreciated  -No psych contraindications to d/c    7. Paroxysmal afib   -Cont eliquis, metoprolol, digoxin     DVT ppx: Eliquis     Dispo: pending clinical improvement, still requiring NRB

## 2020-12-15 NOTE — PROGRESS NOTE ADULT - ATTENDING COMMENTS
I have personally seen, examined and participated in the care of this patient. I have reviewed all pertinent clinical information, including history, physical exam, plan and agree with the above.   pt still requiring nrb; encouraged to prone   polycythemia, poss due to volume contraction; gentle IVF, f/u repeat labs.

## 2020-12-15 NOTE — PHYSICAL THERAPY INITIAL EVALUATION ADULT - NEUROVASCULAR ASSESSMENT LLE
Pulmonary [address] [facility]                             Pulmonary Consult/Progress Note :          Patient: Juliane Savage  MRN: [id number]  : 1966      Date of Admission: .3/5/2020  8:27 PM    Consulting Physician:Dr Davis         Reason for Consultation:SOB ,SIMENTAL   CC : FELIBETRO   HPI:   Juliane Savage is a 52y.o. year old with h/o nonischemic cardiomyopathy, was seen before in [facility], she is also known to have atrial flutter and anxiety, and she was have to find PFO in 2D echo with bubble study,    She presented to the hospital with worsening shortness of breath she had work-up for hair history of congestive heart failure and echo shows ejection fraction of 60%, it did also shows PFO  She states for the last few days she is having worsening shortness of breath with dyspnea on exertion with no chest pain or orthopnea she also stated that she has been having dry cough with no sputum    She also mentioned on admission that she had some chest pain    Work-up included CBC, troponin, echo, was unremarkable    She had CTA of the chest that shows no pulmonary embolism, but shows bilateral groundglass opacities/air trapping and pulmonary was consulted    She smoke only for 6 years     PAST MEDICAL HISTORY:   Past Medical History:   Diagnosis Date    Cardiomyopathy [facility])     CHF (congestive heart failure) ([facility] [address]     Fibroleiomyoma     Paroxysmal atrial fibrillation ([facility] [address] 3/7/2020       PAST SURGICAL HISTORY:   Past Surgical History:   Procedure Laterality Date    APPENDECTOMY      BACK SURGERY      CHOLECYSTECTOMY         FAMILY HISTORY:   History reviewed. No pertinent family history.     SOCIAL HISTORY:   Social History     Socioeconomic History    Marital status: Single     Spouse name: Not on file    Number of children: Not on file    Years of education: Not on file    Highest education level: Not on file   Occupational History    Not on file   Social Needs    Financial resource strain: Not on file    Food insecurity     Worry: Not on file     Inability: Not on file    Transportation needs     Medical: Not on file     Non-medical: Not on file   Tobacco Use    Smoking status: Former Smoker    Smokeless tobacco: Never Used   Substance and Sexual Activity    Alcohol use: Yes     Comment: socially    Drug use: Never    Sexual activity: Not on file   Lifestyle    Physical activity     Days per week: Not on file     Minutes per session: Not on file    Stress: Not on file   Relationships    Social connections     Talks on phone: Not on file     Gets together: Not on file     Attends Mu-ism service: Not on file     Active member of club or organization: Not on file     Attends meetings of clubs or organizations: Not on file     Relationship status: Not on file    Intimate partner violence     Fear of current or ex partner: Not on file     Emotionally abused: Not on file     Physically abused: Not on file     Forced sexual activity: Not on file   Other Topics Concern    Not on file   Social History Narrative    Not on file     Social History     Tobacco Use   Smoking Status Former Smoker   Smokeless Tobacco Never Used     Social History     Substance and Sexual Activity   Alcohol Use Yes    Comment: socially     Social History     Substance and Sexual Activity   Drug Use Never         HOME MEDICATIONS:  Prior to Admission medications    Medication Sig Start Date End Date Taking?  Authorizing Provider   aspirin 81 MG chewable tablet Take 1 tablet by mouth daily 3/8/20  Yes Juanito Gary,    isosorbide mononitrate (IMDUR) 30 MG extended release tablet Take 1 tablet by mouth daily 3/7/20  Yes Jaspal Escobar,    furosemide (LASIX) 20 MG tablet Take 20 mg by mouth 2 times daily May take 1/2 tab for increased edema or weight gain   Yes Historical Provider, MD   spironolactone (ALDACTONE) 25 MG tablet Take 25 mg by mouth daily   Yes Historical Date    WBC 7.6 03/05/2020    HGB 13.6 03/05/2020    HCT 42.4 03/05/2020    MCV 89.1 03/05/2020     03/05/2020    LYMPHOPCT 18.9 (L) 03/05/2020    RBC 4.76 03/05/2020    MCH 28.6 03/05/2020    MCHC 32.1 03/05/2020    RDW 12.0 03/05/2020    NEUTOPHILPCT 72.7 03/05/2020    MONOPCT 6.2 03/05/2020    BASOPCT 0.5 03/05/2020    NEUTROABS 5.52 03/05/2020    LYMPHSABS 1.44 (L) 03/05/2020    MONOSABS 0.47 03/05/2020    EOSABS 0.10 03/05/2020    BASOSABS 0.04 03/05/2020       Recent Labs     03/05/20  1445   WBC 7.6   HGB 13.6   HCT 42.4   MCV 89.1          BMP:   Recent Labs     03/07/20  0647 03/08/20  0625 03/09/20  0556    137 141   K 3.9 3.9 3.9    100 103   CO2 23 23 24   BUN 19 20 23*   CREATININE 1.0 1.1* 1.2*       MG: No results found for: MG  Ca/Phos:   Lab Results   Component Value Date    CALCIUM 9.2 03/09/2020     Amylase: No results found for: AMYLASE  Lipase: No results found for: LIPASE  LIVER PROFILE:   Recent Labs     03/07/20  0647 03/08/20  0625 03/09/20  0556   AST 18 16 15   ALT 10 9 10   BILITOT 0.5 0.6 0.3   ALKPHOS 65 61 63       PT/INR: No results for input(s): PROTIME, INR in the last 72 hours. APTT: No results for input(s): APTT in the last 72 hours. Cardiac Enzymes:  Lab Results   Component Value Date    TROPONINI <0.01 03/09/2020             PROBLEM LIST:  Patient Active Problem List   Diagnosis    Chest pain    Paroxysmal atrial fibrillation (HCC)                     ASSESSMENT:  1.) h/o Cardiomyopathy ,non ischemic   2. )PFO  3.)Ground glass opacities   4.)Cough   5. )? ARNEL      PLAN:  *patient with unexplained SOB as her 2 d echo shows normal EF   *- at this point will do work up for GGO ,include inflammatory process or HPS ,    *-I believe we need to assess/work up for  PHTN ,with her PFO ,RHC may be needed,May need to be seen by Dr Erik Vanegas   *-sed rate ,CRP ,hypersensitiveiy panel ,PFT   *-if SOB persist and no cardiac explanation ,then will do Bronchoscopy no discoloration/no numbness/warm/no tingling

## 2020-12-15 NOTE — PHYSICAL THERAPY INITIAL EVALUATION ADULT - CRITERIA FOR SKILLED THERAPEUTIC INTERVENTIONS
rehab potential/predicted duration of therapy intervention/risk reduction/prevention/functional limitations in following categories/anticipated equipment needs at discharge/impairments found/anticipated discharge recommendation/therapy frequency

## 2020-12-15 NOTE — PHYSICAL THERAPY INITIAL EVALUATION ADULT - DISCHARGE DISPOSITION, PT EVAL
acute rehab for strength, transfers, balance, endurance and ambulation/stair training, as pt is an increased falls risk and has multiple stairs to negotiate at home, deeming pt unsafe to return home at this time. pt would benefit from increased hours of rehab a day/3 hrs a day to help progress to functional goals.

## 2020-12-15 NOTE — PROGRESS NOTE ADULT - SUBJECTIVE AND OBJECTIVE BOX
[facility]  INFECTIOUS DISEASES AND INTERNAL MEDICINE at [place]  =======================================================  Eliezer Greenfield MD  Diplomates American Board of Internal Medicine and Infectious Diseases  Tel  [phone]  Fax [phone]  =======================================================    [id number]  YULI HEADLEY   follow up: COVID-19    still on NRB    POOR appetite  does not want to eat    =======================================================    REVIEW OF SYSTEMS:  CONSTITUTIONAL:  No Fever or chills  HEENT:  No diplopia or blurred vision.  No earache, sore throat or runny nose.  CARDIOVASCULAR:  No pressure, squeezing, strangling, tightness, heaviness or aching about the chest, neck, axilla or epigastrium.  RESPIRATORY:  POSITIVE cough, shortness of breath  GASTROINTESTINAL:  No nausea, vomiting or diarrhea.  GENITOURINARY:  No dysuria, frequency or urgency. No Blood in urine  MUSCULOSKELETAL:  no joint aches, no muscle pain  SKIN:  No change in skin, hair or nails.  NEUROLOGIC:  No Headaches, seizures or weakness.  PSYCHIATRIC:  No disorder of thought or mood.  ENDOCRINE:  No heat or cold intolerance  HEMATOLOGICAL:  No easy bruising or bleeding.    =======================================================    Allergies  No Known Allergies    ======================================================  Physical Exam:  ============     General:  No acute distress.  Eye: Pupils are equal, round and reactive to light, Extraocular movements are intact, Normal conjunctiva.  HENT: Normocephalic, Oral mucosa is DRY, No pharyngeal erythema, No sinus tenderness.  Neck: Supple, No lymphadenopathy.  Respiratory: Lungs with COARSE breath sounds bilateral   Cardiovascular: Normal rate, Regular rhythm,   Gastrointestinal: Soft, Non-tender, Non-distended, Normal bowel sounds.  Genitourinary: No costovertebral angle tenderness.  Lymphatics: No lymphadenopathy neck,   Musculoskeletal: Normal range of motion, Normal strength.  Integumentary: No rash.  Neurologic: Alert, Oriented, No focal deficits, Cranial Nerves II-XII are grossly intact.  Psychiatric: Appropriate mood & affect.    =======================================================  Vitals:  ============  T(F): 96.9 (15 Dec 2020 07:55), Max: 97.9 (14 Dec 2020 16:14)  HR: 83 (15 Dec 2020 07:55)  BP: 95/64 (15 Dec 2020 07:55)  RR: 19 (15 Dec 2020 07:55)  SpO2: 96% (15 Dec 2020 07:55) (91% - 96%)  temp max in last 48H T(F): , Max: 98.2 (12-14-20 @ 06:59)    =======================================================  Current Antibiotics:    Other medications:  ALBUTerol    90 MICROgram(s) HFA Inhaler 2 Puff(s) Inhalation every 6 hours  apixaban 5 milliGRAM(s) Oral every 12 hours  ascorbic acid 500 milliGRAM(s) Oral daily  aspirin enteric coated 81 milliGRAM(s) Oral daily  atorvastatin 40 milliGRAM(s) Oral at bedtime  cholecalciferol 1000 Unit(s) Oral daily  dextrose 40% Gel 15 Gram(s) Oral once  dextrose 5%. 1000 milliLiter(s) IV Continuous <Continuous>  dextrose 5%. 1000 milliLiter(s) IV Continuous <Continuous>  dextrose 50% Injectable 25 Gram(s) IV Push once  dextrose 50% Injectable 12.5 Gram(s) IV Push once  dextrose 50% Injectable 25 Gram(s) IV Push once  digoxin     Tablet 0.125 milliGRAM(s) Oral daily  glucagon  Injectable 1 milliGRAM(s) IntraMuscular once  guaiFENesin  milliGRAM(s) Oral every 12 hours  insulin detemir injectable (LEVEMIR) 15 Unit(s) SubCutaneous at bedtime  insulin lispro (ADMELOG) corrective regimen sliding scale   SubCutaneous three times a day before meals  insulin lispro (ADMELOG) corrective regimen sliding scale   SubCutaneous at bedtime  metoprolol succinate ER 25 milliGRAM(s) Oral daily  multivitamin/minerals 1 Tablet(s) Oral daily  sodium chloride 0.9%. 1000 milliLiter(s) IV Continuous <Continuous>      =======================================================  Labs:                        19.3   32.17 )-----------( 239      ( 15 Dec 2020 07:08 )             56.2      12-15    134<L>  |  95<L>  |  56.0<H>  ----------------------------<  167<H>  5.0   |  26.0  |  0.97    Ca    9.3      15 Dec 2020 07:08  Mg     2.5     12-15    TPro  7.7  /  Alb  3.1<L>  /  TBili  1.6  /  DBili  0.3  /  AST  18  /  ALT  20  /  AlkPhos  112  12-15      GI PCR Panel, Stool (collected 12-04-20 @ 21:55)  Source: .Stool Feces  Final Report (12-05-20 @ 02:57):    GI PCR Results: NOT detected    *******Please Note:*******    GI panel PCR evaluates for:    Campylobacter, Plesiomonas shigelloides, Salmonella,    Vibrio, Yersinia enterocolitica, Enteroaggregative    Escherichia coli (EAEC), Enteropathogenic E.coli (EPEC),    Enterotoxigenic E. coli (ETEC) lt/st, Shiga-like    toxin-producing E. coli (STEC) stx1/stx2,    Shigella/ Enteroinvasive E. coli (EIEC), Cryptosporidium,    Cyclospora cayetanensis, Entamoeba histolytica,    Giardia lamblia, Adenovirus F 40/41, Astrovirus,    Norovirus GI/GII, Rotavirus A, Sapovirus    Culture - Stool (collected 12-04-20 @ 21:55)  Source: .Stool Feces  Final Report (12-06-20 @ 16:58):    No enteric pathogens isolated.    (Stool culture examined for Salmonella,    Shigella, Campylobacter, Aeromonas, Plesiomonas,    Vibrio, E.coli O157 and Yersinia)    Culture - Urine (collected 12-04-20 @ 15:05)  Source: .Urine Clean Catch (Midstream)  Final Report (12-05-20 @ 11:24):    <10,000 CFU/mL Normal Urogenital Emily    Culture - Blood (collected 12-04-20 @ 00:54)  Source: .Blood Blood-Peripheral  Final Report (12-09-20 @ 01:00):    No growth at 5 days.    Culture - Blood (collected 12-04-20 @ 00:53)  Source: .Blood Blood-Peripheral  Final Report (12-09-20 @ 01:00):    No growth at 5 days.      Creatinine, Serum: 0.97 mg/dL (12-15-20 @ 07:08)  Creatinine, Serum: 0.95 mg/dL (12-15-20 @ 01:12)  Creatinine, Serum: 0.80 mg/dL (12-14-20 @ 19:10)  Creatinine, Serum: 0.82 mg/dL (12-14-20 @ 07:23)  Creatinine, Serum: 0.59 mg/dL (12-13-20 @ 09:50)  Creatinine, Serum: 0.63 mg/dL (12-12-20 @ 06:18)  Creatinine, Serum: 0.69 mg/dL (12-11-20 @ 08:47)    Procalcitonin, Serum: 0.11 ng/mL (12-15-20 @ 07:08)  Procalcitonin, Serum: 0.04 ng/mL (12-13-20 @ 09:50)  Procalcitonin, Serum: 0.08 ng/mL (12-10-20 @ 13:36)  Procalcitonin, Serum: 0.11 ng/mL (12-03-20 @ 20:28)    D-Dimer Assay, Quantitative: 220 ng/mL DDU (12-15-20 @ 07:40)  D-Dimer Assay, Quantitative: <150 ng/mL DDU (12-09-20 @ 16:24)    Ferritin, Serum: 1398 ng/mL (12-15-20 @ 07:08)  Ferritin, Serum: 978 ng/mL (12-13-20 @ 09:50)  Ferritin, Serum: 823 ng/mL (12-10-20 @ 13:36)  Ferritin, Serum: 1050 ng/mL (12-05-20 @ 06:19)  Ferritin, Serum: 1275 ng/mL (12-03-20 @ 20:28)    C-Reactive Protein, Serum: 5.50 mg/dL (12-15-20 @ 07:08)  C-Reactive Protein, Serum: 1.57 mg/dL (12-13-20 @ 09:50)  C-Reactive Protein, Serum: 1.24 mg/dL (12-10-20 @ 13:36)  C-Reactive Protein, Serum: 0.90 mg/dL (12-05-20 @ 06:19)  C-Reactive Protein, Serum: 1.22 mg/dL (12-03-20 @ 20:28)    WBC Count: 32.17 K/uL (12-15-20 @ 07:08)  WBC Count: 25.72 K/uL (12-14-20 @ 07:23)  WBC Count: 23.22 K/uL (12-13-20 @ 09:50)    SARS-CoV-2: Detected (12-03-20 @ 20:35)  Rapid RVP Result: Detected (12-03-20 @ 20:35)    COVID-19 IgG Antibody Index: <0.10 Index (12-04-20 @ 13:11)  COVID-19 IgG Antibody Interpretation: Negative (12-04-20 @ 13:11)    Lactate Dehydrogenase, Serum: 347 U/L (12-15-20 @ 07:08)  Lactate Dehydrogenase, Serum: 390 U/L (12-13-20 @ 09:50)  Lactate Dehydrogenase, Serum: 324 U/L (12-10-20 @ 13:36)    Alkaline Phosphatase, Serum: 112 U/L (12-15-20 @ 07:08)  Alkaline Phosphatase, Serum: 112 U/L (12-15-20 @ 07:08)  Alkaline Phosphatase, Serum: 97 U/L (12-14-20 @ 07:23)  Alkaline Phosphatase, Serum: 92 U/L (12-13-20 @ 09:50)  Alkaline Phosphatase, Serum: 84 U/L (12-13-20 @ 09:50)  Alkaline Phosphatase, Serum: 87 U/L (12-12-20 @ 06:18)  Alanine Aminotransferase (ALT/SGPT): 20 U/L (12-15-20 @ 07:08)  Alanine Aminotransferase (ALT/SGPT): 20 U/L (12-15-20 @ 07:08)  Alanine Aminotransferase (ALT/SGPT): 21 U/L (12-14-20 @ 07:23)  Alanine Aminotransferase (ALT/SGPT): 23 U/L (12-13-20 @ 09:50)  Alanine Aminotransferase (ALT/SGPT): 23 U/L (12-13-20 @ 09:50)  Alanine Aminotransferase (ALT/SGPT): 28 U/L (12-12-20 @ 06:18)  Aspartate Aminotransferase (AST/SGOT): 18 U/L (12-15-20 @ 07:08)  Aspartate Aminotransferase (AST/SGOT): 19 U/L (12-15-20 @ 07:08)  Aspartate Aminotransferase (AST/SGOT): 15 U/L (12-14-20 @ 07:23)  Aspartate Aminotransferase (AST/SGOT): 19 U/L (12-13-20 @ 09:50)  Aspartate Aminotransferase (AST/SGOT): 18 U/L (12-13-20 @ 09:50)  Aspartate Aminotransferase (AST/SGOT): 21 U/L (12-12-20 @ 06:18)    Bilirubin Total, Serum: 1.6 mg/dL (12-15-20 @ 07:08)  Bilirubin Total, Serum: 1.6 mg/dL (12-15-20 @ 07:08)  Bilirubin Total, Serum: 1.2 mg/dL (12-14-20 @ 07:23)  Bilirubin Total, Serum: 1.3 mg/dL (12-13-20 @ 09:50)  Bilirubin Total, Serum: 1.3 mg/dL (12-13-20 @ 09:50)  Bilirubin Total, Serum: 1.2 mg/dL (12-12-20 @ 06:18)  Bilirubin Direct, Serum: 0.3 mg/dL (12-15-20 @ 07:08)  Bilirubin Direct, Serum: 0.3 mg/dL (12-14-20 @ 07:23)  Bilirubin Direct, Serum: 0.3 mg/dL (12-13-20 @ 09:50)  Bilirubin Direct, Serum: 0.2 mg/dL (12-12-20 @ 06:18)

## 2020-12-15 NOTE — PHYSICAL THERAPY INITIAL EVALUATION ADULT - GENERAL OBSERVATIONS, REHAB EVAL
Pt received on 4BRK, LOU moody'ed pt for PT. pt observed semi-rai in bed with NRB, IV, BP cuff, telemontior with , condom cath, pleasant, cooperative, A&O and c/o 0/10 pain t/o eval

## 2020-12-15 NOTE — PHYSICAL THERAPY INITIAL EVALUATION ADULT - ADDITIONAL COMMENTS
As per pt, pt lives with spouse and two adult sons in a private home with 2-3 HOLLY + 8steps (unclear as to if those lead to bed&bath, f/u with pt &/or pt's family to confirm exact number of stairs that pt needs to negotiate is recommended). Pt's PLOF was independent in all ADLs/ambulation without an Assistive Device

## 2020-12-15 NOTE — PHYSICAL THERAPY INITIAL EVALUATION ADULT - PLANNED THERAPY INTERVENTIONS, PT EVAL
transfer training/gait training/stair + endurance training/bed mobility training/strengthening/balance training

## 2020-12-15 NOTE — PROGRESS NOTE ADULT - SUBJECTIVE AND OBJECTIVE BOX
YULI HEADLEY    [id number]    70y      Male    CC: weakness    INTERVAL HPI/OVERNIGHT EVENTS: Pt seen and examined. still on NRB. No specific complaints, denies cp, abd pain, n/v    Vital Signs Last 24 Hrs  T(C): 36.1 (15 Dec 2020 07:55), Max: 36.6 (14 Dec 2020 16:14)  T(F): 96.9 (15 Dec 2020 07:55), Max: 97.9 (14 Dec 2020 16:14)  HR: 83 (15 Dec 2020 07:55) (80 - 103)  BP: 95/64 (15 Dec 2020 07:55) (95/64 - 127/78)  BP(mean): 95 (14 Dec 2020 21:32) (86 - 95)  RR: 19 (15 Dec 2020 07:55) (18 - 21)  SpO2: 96% (15 Dec 2020 07:55) (91% - 96%) on NRB    PHYSICAL EXAM:  GENERAL: NAD  HEENT: +EOMI  NECK: warm, dry  CHEST/LUNG: decreased lung sounds b/l; non-labored respirations on NRB  HEART: S1S2+, Regular rate and rhythm; No murmurs, rubs, or gallops  ABDOMEN: Soft, Nontender, Nondistended; Bowel sounds present  SKIN: warm, dry  NEURO: Awake, alert    LABS/IMAGING: REVIEWED

## 2020-12-15 NOTE — PHYSICAL THERAPY INITIAL EVALUATION ADULT - DIAGNOSIS, PT EVAL
[facility]    Medicine Progress Note - Hospitalist Service       Date of Admission:  3/15/2019  Assessment & Plan   Gil Chowdary is a 57 year old male with history of severe aortic valve stenosis status post replacement in 2017,  heart failure with preserved ejection fraction, morbid obesity, obstructive sleep apnea, anemia and thrombocytopenia, type 2 diabetes, and former tobacco use who presented to the ED from cardiology clinic with complaints of weight gain. He has gained about 15 lbs in the last few weeks. Cardiology has started bumex drip and recommended nephrology consultation for question of diuretic resistance.    Exacerbation of heart failure with preserved ejection fraction  History of severe aortic stenosis s/p tissue valve replacement 2017  15 pounds up in ~3 weeks despite reported compliance with meds - he does not routinely monitor his amount of fluid intake  NT Pro ; last echo October 2018 - EF 60-65%, diastolic dysfunction noted  PTA ASA 81, metoprolol tartrate 50 bid continued with holding parameters   PTA eplerenone held given bumex drip  3/16-Today his weight is down from 140.6Kg to 135.5Kg  Continue intravenous bumetanide   Monitor weight, electrolytes and renal function  3/17-weight is down to 132.7Kg- creatinine around 1.5 HCO3 27- would continue intravenous diuretic one more day. Lower extremity  ultrasound was negative for deep venous thrombosis.  3/18- weight 130.5Kg and renal function is stable- continue intravenous bumetanide infusion  3/19- weight down, renal function stable- discussed with cardiology- right heart catheterization today. Continue bumetanide     Possible acute on chronic renal failure  Creatinine is stable and around baseline     Type 2 diabetes mellitus   October 2018 A1c 6.8%  POC QID with sliding scale insulin  PTA glargine (20u qam) adjusted to 16 units every morning  3/17- glucometers in the 80's to 90's - decrease glargine  3/18-  glucometers 89/96/106- continue same insulin dosing today  3/19- glucometers still < 100- decrease Lantus again     DRAKE on CPAP  Continue CPAP     Iron deficiency anemia and thrombocytopenia  Hemoglobin 9.3g, appears his baseline as of December  Platelets 72K- continue to monitor      History of hyperlipidemia  PTA statin continued     History of GERD  PTA pantoprazole continued    Dementia   Continue donepezil     Diet: NPO per Anesthesia Guidelines for Procedure/Surgery Except for: Meds    DVT Prophylaxis: pneumatic compression device   [place] Catheter: not present  Code Status: Full Code      Disposition Plan   Expected discharge: 1-2 days, recommended to prior living arrangement once adequate diuresis has been obtained.  Entered: Norm Washington MD 03/19/2019, 9:56 AM     The patient's care was discussed with the Patient.    Norm Washington MD  Hospitalist Service  [facility]    ______________________________________________________________________    Interval History   No new complaints    Data reviewed today: I reviewed all medications, new labs and imaging results over the last 24 hours. I personally reviewed no images or EKG's today.    Physical Exam   Vital Signs: Temp: 98  F (36.7  C) Temp src: Oral BP: 91/46   Heart Rate: 61 Resp: 18 SpO2: 93 % O2 Device: None (Room air)    Weight: 283 lbs 11.2 oz  Constitutional: obese, awake, alert, cooperative, no apparent distress, and appears stated age  Respiratory: No increased work of breathing, good air exchange, clear to auscultation bilaterally, no crackles or wheezing  Cardiovascular:  regular rate and rhythm, normal S1 and S2, no S3 or S4, and no murmur noted; legs are wrapped  GI: soft nontender and nondistended   Skin: no rashes and no jaundice  Musculoskeletal: There is no redness, warmth, or swelling of the joints.  Full range of motion noted.   Neurologic: Awake, alert, oriented to name, place and time.  Cranial nerves  II-XII are grossly intact.  Motor is 5 out of 5 bilaterally  Neuropsychiatric: General: normal, calm and normal eye contact    Data   Recent Labs   Lab 03/19/19  0616 03/18/19  0615 03/17/19  0535  03/15/19  1919 03/15/19  1444   WBC  --  3.8*  --   --   --  4.2   HGB  --  10.2*  --   --   --  9.3*   MCV  --  100  --   --   --  102*   PLT 72* 79*  --   --   --  79*   INR  --   --   --   --  1.28*  --     138 141   < >  --  140   POTASSIUM 3.6 3.7 3.9   < >  --  4.4   CHLORIDE 104 103 107   < >  --  105   CO2 28 28 27   < >  --  27   BUN 25 26 26   < >  --  19   CR 1.44* 1.50* 1.58*   < >  --  1.70*   ANIONGAP 7 7 7   < >  --  12.4   MYRON 8.2* 8.4* 8.3*   < >  --  8.6   GLC 85 96 92   < >  --  161*   ALBUMIN  --  3.5 3.3*  --   --   --    PROTTOTAL  --  8.2  --   --   --   --    BILITOTAL  --  0.9  --   --   --   --    ALKPHOS  --  72  --   --   --   --    ALT  --  44  --   --   --   --    AST  --  88*  --   --   --   --     < > = values in this interval not displayed.     No results found for this or any previous visit (from the past 24 hour(s)).   Decreased functional status and mobility due to decrease strength, transfers, balance, endurance and ambulation/stair tolerance

## 2020-12-15 NOTE — CHART NOTE - NSCHARTNOTEFT_GEN_A_CORE
PA event note    Patient was seen by physical therapy, was sat up at the end of the bed while on NRB, pt noted to desat, was placed back in bed. Pt with sustain desats to low 80s. Placed on 6L NC in addition to NRB and proned. Chest PT given at bedside. Pt also given ativan x 1 for WOB. Pt saturation improved to 91-94% with 6L and NRB. STAT abg performed at bedside, awaiting results. Repeat CXR and ddimer was repeated earlier in the day and both without significant change/actionable findings. For now, pt to continue on 6L NC and NRB. If status worsens RN to call PA. PA to follow ABG results.

## 2020-12-16 NOTE — PROGRESS NOTE ADULT - SUBJECTIVE AND OBJECTIVE BOX
YULI HEADLEY    [id number]    70y      Male    CC: weakness    See chart note from yesterday 12/15 afternoon, desated while working with PT, required 6L NC and NRB, eventually improved and weaned back to just NRB overnight    Pt seen and examined at bedside this am  Laying in bed, no specific complaints, appears weak and tired  On NRB, maintaining sats 96%, will attempt to wean if able today  Decreased appetite, not eating much, low BG this am  Remainder of ROS neg  VSS on NRB    Vital Signs Last 24 Hrs  T(C): 37 (16 Dec 2020 07:48), Max: 37.2 (15 Dec 2020 16:37)  T(F): 98.6 (16 Dec 2020 07:48), Max: 98.9 (15 Dec 2020 16:37)  HR: 79 (16 Dec 2020 07:48) (79 - 114)  BP: 104/64 (16 Dec 2020 07:48) (85/75 - 115/91)  BP(mean): 77 (16 Dec 2020 07:48) (77 - 77)  RR: 17 (16 Dec 2020 07:48) (16 - 36)  SpO2: 96% (16 Dec 2020 07:48) (69% - 100%) on NRB    PHYSICAL EXAM:  GENERAL: NAD, tired, weak   HEENT: +EOMI  NECK: warm, dry  CHEST/LUNG: decreased lung sounds b/l; non-labored respirations on NRB  HEART: S1S2+, Regular rate and rhythm; No murmurs, rubs, or gallops  ABDOMEN: Soft, Nontender, Nondistended; Bowel sounds present  SKIN: warm, dry  NEURO: Awake, alert    LABS/IMAGING: REVIEWED              MEDICATIONS  (STANDING):  ALBUTerol    90 MICROgram(s) HFA Inhaler 2 Puff(s) Inhalation every 6 hours  apixaban 5 milliGRAM(s) Oral every 12 hours  ascorbic acid 500 milliGRAM(s) Oral daily  aspirin enteric coated 81 milliGRAM(s) Oral daily  atorvastatin 40 milliGRAM(s) Oral at bedtime  cholecalciferol 1000 Unit(s) Oral daily  dextrose 40% Gel 15 Gram(s) Oral once  dextrose 5%. 1000 milliLiter(s) (50 mL/Hr) IV Continuous <Continuous>  dextrose 5%. 1000 milliLiter(s) (100 mL/Hr) IV Continuous <Continuous>  dextrose 50% Injectable 25 Gram(s) IV Push once  dextrose 50% Injectable 12.5 Gram(s) IV Push once  dextrose 50% Injectable 25 Gram(s) IV Push once  digoxin     Tablet 0.125 milliGRAM(s) Oral daily  glucagon  Injectable 1 milliGRAM(s) IntraMuscular once  guaiFENesin  milliGRAM(s) Oral every 12 hours  insulin detemir injectable (LEVEMIR) 5 Unit(s) SubCutaneous at bedtime  insulin lispro (ADMELOG) corrective regimen sliding scale   SubCutaneous three times a day before meals  insulin lispro (ADMELOG) corrective regimen sliding scale   SubCutaneous at bedtime  metoprolol succinate ER 25 milliGRAM(s) Oral daily  multivitamin/minerals 1 Tablet(s) Oral daily  sodium chloride 0.9%. 1000 milliLiter(s) (75 mL/Hr) IV Continuous <Continuous>    MEDICATIONS  (PRN):  acetaminophen   Tablet .. 650 milliGRAM(s) Oral every 6 hours PRN Temp greater or equal to 38C (100.4F)  ALBUTerol    90 MICROgram(s) HFA Inhaler 2 Puff(s) Inhalation every 4 hours PRN Shortness of Breath and/or Wheezing  haloperidol    Injectable 2.5 milliGRAM(s) IntraMuscular every 6 hours PRN agitation  ondansetron Injectable 4 milliGRAM(s) IV Push every 6 hours PRN Nausea and/or Vomiting

## 2020-12-16 NOTE — PROGRESS NOTE ADULT - ASSESSMENT
70y/oM PMH HTN, HLD, DM, CAD s/p CABG, AICD presenting with weakness, diarrhea, anorexia x4 days, admitted with COVID-19 PNA     1. Acute hypoxic respiratory failure 2/2 COVID-19 PNA   -ID recs appreciated   -Completed remdesivir x 10 days  -C/w dexamethasone  -Slow clinical improvement, still on NRB, wean o2 as able (required 6L NC in addition to NRB yesterday 12/15)  -Repeat CXR yesterday 12/15 w/ no signifcant new changes, ddimer also repeated and wnl at 220 (prior ddimer 150)  -Will repeat CT chest today   -C/w full dose AC w/ eliquis  -Cont supportive care, prone as tolerated     2.Thrombocytopenia, Lymphopenia   -Likely 2/2 viral infection   -Resolved    3. Hyperkalemia   -Now resolved, continue to hold lisinopril as BP stable off meds currently  -Monitor BP    4. CAD s/p CABG, HTN, HLD  -Cont metoprolol, statin, asa  -Lisinopril on hold due to hyperkalemia and BP stable w/o it currently. SBP on low side    5. DM2   -With hypoglycemia as pt with decreased appetite  -Will start maintence fluids w/ D5  -HgbA1c 6.3   -ISS  -Levemir 10uqhs    6. Suicidal thoughts   -Pt currently denies  -Psych recs appreciated  -No psych contraindications to d/c    7. Paroxysmal afib   -Cont eliquis, metoprolol, digoxin     DVT ppx: Eliquis     Dispo: pending clinical improvement, still requiring NRB         70y/oM PMH HTN, HLD, DM, CAD s/p CABG, AICD presenting with weakness, diarrhea, anorexia x4 days, admitted with COVID-19 PNA     1. Acute hypoxic respiratory failure 2/2 COVID-19 PNA   -ID recs appreciated   -Completed remdesivir x 10 days  -C/w dexamethasone  -Slow clinical improvement, still on NRB, wean o2 as able (required 6L NC in addition to NRB yesterday 12/15)  -Repeat CXR yesterday 12/15 w/ no signifcant new changes, ddimer also repeated and wnl at 220 (prior ddimer 150)  -Will repeat CT chest today   -C/w full dose AC w/ eliquis  -Cont supportive care, prone as tolerated     2.Thrombocytopenia, Lymphopenia   -Likely 2/2 viral infection   -Resolved    3. Hyperkalemia   -Now resolved, continue to hold lisinopril as BP stable off meds currently  -Monitor BP    4. CAD s/p CABG, HTN, HLD  -Cont metoprolol, statin, asa  -Lisinopril on hold due to hyperkalemia and BP stable w/o it currently. SBP on low side    5. DM2   -With hypoglycemia as pt with decreased appetite  -Will start maintence fluids w/ D5  -HgbA1c 6.3   -ISS  -Levemir 5 QHS    6. Suicidal thoughts   -Pt currently denies  -Psych recs appreciated  -No psych contraindications to d/c    7. Paroxysmal afib   -Cont eliquis, metoprolol, digoxin     DVT ppx: Eliquis     Dispo: pending clinical improvement, still requiring NRB         70y/oM PMH HTN, HLD, DM, CAD s/p CABG, AICD presenting with weakness, diarrhea, anorexia x4 days, admitted with COVID-19 PNA     1. Acute hypoxic respiratory failure 2/2 COVID-19 PNA   -ID recs appreciated   -Completed remdesivir x 10 days  -C/w dexamethasone  -Slow clinical improvement, still on NRB, wean o2 as able (required 6L NC in addition to NRB yesterday 12/15)  -Repeat CXR yesterday 12/15 w/ no signifcant new changes, ddimer also repeated and wnl at 220 (prior ddimer 150)  -Will repeat CT chest today   -Trend inflammatory markers, next set tomorrow 12/17  -C/w full dose AC w/ eliquis  -Cont supportive care, prone as tolerated     2.Thrombocytopenia, Lymphopenia   -Likely 2/2 viral infection   -Resolved    3. Hyperkalemia   -Now resolved, continue to hold lisinopril as BP stable off meds currently  -Monitor BP    4. CAD s/p CABG, HTN, HLD  -Cont metoprolol, statin, asa  -Lisinopril on hold due to hyperkalemia and BP stable w/o it currently. SBP on low side    5. DM2   -With hypoglycemia as pt with decreased appetite  -Will start maintence fluids w/ D5  -HgbA1c 6.3   -ISS  -Levemir 5 QHS    6. Suicidal thoughts   -Pt currently denies  -Psych recs appreciated  -No psych contraindications to d/c    7. Paroxysmal afib   -Cont eliquis, metoprolol, digoxin     DVT ppx: Eliquis     Dispo: pending clinical improvement, still requiring NRB         70y/oM PMH HTN, HLD, DM, CAD s/p CABG, AICD presenting with weakness, diarrhea, anorexia x4 days, admitted with COVID-19 PNA     1. Acute hypoxic respiratory failure 2/2 COVID-19 PNA   -ID recs appreciated   -Completed remdesivir x 10 days  -C/w dexamethasone  -Slow clinical improvement, still on NRB, wean o2 as able (required 6L NC in addition to NRB yesterday 12/15)  -Repeat CXR yesterday 12/15 w/ no signifcant new changes, ddimer also repeated and wnl at 220 (prior ddimer 150)  -Will repeat CT chest today   -Trend inflammatory markers, next set tomorrow 12/17  -C/w full dose AC w/ eliquis  -Cont supportive care, prone as tolerated     2.Thrombocytopenia, Lymphopenia   -Likely 2/2 viral infection   -Resolved    3. Hyperkalemia   -Now resolved, continue to hold lisinopril as BP stable off meds currently  -Monitor BP    4. CAD s/p CABG, HTN, HLD  -Cont metoprolol, statin, asa  -Lisinopril on hold due to hyperkalemia and BP stable w/o it currently. SBP on low side    5. DM2   -With hypoglycemia as pt with decreased appetite  -Will start maintence fluids w/ D5  -HgbA1c 6.3   -ISS  -Levemir 5 QHS    6. Suicidal thoughts   -Pt currently denies  -Psych recs appreciated  -No psych contraindications to d/c    7. Paroxysmal afib   -Cont eliquis, metoprolol, digoxin     DVT ppx: Eliquis     Dispo: pending clinical improvement, still requiring NRB    attempted to call son, no answer

## 2020-12-16 NOTE — CHART NOTE - NSCHARTNOTEFT_GEN_A_CORE
Contacted by RN for sustained desat to 86-87% for approx. 15 mins while on 50% venti mask   As per RN, pt refusing to prone  Patient seen and examined at bedside.  Pt endorsing SOB, otherwise has no complaints. Denies CP, HA, N/V, cough.     Vital Signs Last 24 Hrs  T(C): 36.6 (16 Dec 2020 21:09), Max: 37.2 (16 Dec 2020 05:47)  T(F): 97.9 (16 Dec 2020 21:09), Max: 98.9 (16 Dec 2020 05:47)  HR: 102 (16 Dec 2020 21:09) (79 - 102)  BP: 105/70 (16 Dec 2020 21:09) (104/64 - 111/70)  BP(mean): 77 (16 Dec 2020 07:48) (77 - 77)  RR: 25 (16 Dec 2020 21:09) (16 - 25)  SpO2: 90% (16 Dec 2020 21:09) (90% - 97%)    PE  Gen:  AOx3 laying bed, NAD, venti mask in place.   CV: +S1S2, tachycardic  Lungs: decreased breath sounds b/l, rhonchi appreciated R base  Abd: normoactive bowel sounds, soft/nt/nd    A/P: hypoxia  O2 goals 88-92%, Overall goal is to wean supp O2. Wean as tolerated  Pt proned and chest PT performed with sustained improvement to 90-92%  Pt left comfortably in NAD in prone position with venti mask in place  Consider NRB if condition worsens/sustained desat <88%  RN to monitor, assess and escalate to PA PRN.  Will continue to monitor. Contacted by RN for sustained desat to 86-87% for approx. 15 mins while on 50% venti mask   As per RN, pt refusing to prone  Patient seen and examined at bedside.  Pt endorsing SOB, otherwise has no complaints. Denies CP, HA, N/V, cough.     Vital Signs Last 24 Hrs  T(C): 36.6 (16 Dec 2020 21:09), Max: 37.2 (16 Dec 2020 05:47)  T(F): 97.9 (16 Dec 2020 21:09), Max: 98.9 (16 Dec 2020 05:47)  HR: 102 (16 Dec 2020 21:09) (79 - 102)  BP: 105/70 (16 Dec 2020 21:09) (104/64 - 111/70)  BP(mean): 77 (16 Dec 2020 07:48) (77 - 77)  RR: 25 (16 Dec 2020 21:09) (16 - 25)  SpO2: 90% (16 Dec 2020 21:09) (90% - 97%)    PE  Gen:  AOx3 laying bed, NAD, venti mask in place.   CV: +S1S2, tachycardic  Lungs: decreased breath sounds b/l, rhonchi appreciated R base  Abd: normoactive bowel sounds, soft/nt/nd    A/P: hypoxia  O2 goals 88-92%, Overall goal is to wean supp O2. Wean as tolerated  Pt proned and chest PT performed with sustained improvement to 90-92%  Pt left comfortably in NAD in prone position with venti mask in place  Consider NRB if condition worsens/sustained desat <88%  RN to monitor, assess and escalate to PA PRN.  Will continue to monitor.    1230 update (late entry): Contacted by RN for sustained desat to 86-87% for approx. 15 mins while on 50% venti mask   As per RN, pt refusing to prone  Patient seen and examined at bedside.  Pt endorsing SOB, otherwise has no complaints. Denies CP, HA, N/V, cough.     Vital Signs Last 24 Hrs  T(C): 36.6 (16 Dec 2020 21:09), Max: 37.2 (16 Dec 2020 05:47)  T(F): 97.9 (16 Dec 2020 21:09), Max: 98.9 (16 Dec 2020 05:47)  HR: 102 (16 Dec 2020 21:09) (79 - 102)  BP: 105/70 (16 Dec 2020 21:09) (104/64 - 111/70)  BP(mean): 77 (16 Dec 2020 07:48) (77 - 77)  RR: 25 (16 Dec 2020 21:09) (16 - 25)  SpO2: 90% (16 Dec 2020 21:09) (90% - 97%)    PE  Gen:  AOx3 laying bed, NAD, venti mask in place.   CV: +S1S2, tachycardic  Lungs: decreased breath sounds b/l, rhonchi appreciated R base  Abd: normoactive bowel sounds, soft/nt/nd    A/P: hypoxia  O2 goals 88-92%, Overall goal is to wean supp O2. Wean as tolerated  Pt proned and chest PT performed with sustained improvement to 90-92%  Pt left comfortably in NAD in prone position with venti mask in place  Consider NRB if condition worsens/sustained desat <88%  RN to monitor, assess and escalate to PA PRN.  Will continue to monitor.    1230 update (late entry):  Contacted by RN again for sustained desat to 85% and pt no longer tolerating proning.  NRB placed by RN  SPO2 improved to 90-91%  Will attempt to wean as tolerated   Continue to monitor. Contacted by RN for sustained desat to 86-87% for approx. 15 mins while on 50% venti mask   As per RN, pt refusing to prone  Patient seen and examined at bedside.  Pt endorsing SOB, otherwise has no complaints. Denies CP, HA, N/V, cough.     Vital Signs Last 24 Hrs  T(C): 36.6 (16 Dec 2020 21:09), Max: 37.2 (16 Dec 2020 05:47)  T(F): 97.9 (16 Dec 2020 21:09), Max: 98.9 (16 Dec 2020 05:47)  HR: 102 (16 Dec 2020 21:09) (79 - 102)  BP: 105/70 (16 Dec 2020 21:09) (104/64 - 111/70)  BP(mean): 77 (16 Dec 2020 07:48) (77 - 77)  RR: 25 (16 Dec 2020 21:09) (16 - 25)  SpO2: 90% (16 Dec 2020 21:09) (90% - 97%)    PE  Gen:  AOx3 laying bed, NAD, venti mask in place.   CV: +S1S2, tachycardic  Lungs: decreased breath sounds b/l, rhonchi appreciated R base  Abd: normoactive bowel sounds, soft/nt/nd    A/P: hypoxia  O2 goals 88-92%, Overall goal is to wean supp O2. Wean as tolerated  Pt proned and chest PT performed with sustained improvement to 90-92%  Pt left comfortably in NAD in prone position with venti mask in place  Consider NRB if condition worsens/sustained desat <88%  RN to monitor, assess and escalate to PA PRN.  Will continue to monitor.    1230 update (late entry):  Contacted by RN again for sustained desat to 85% and pt no longer tolerating proning.  NRB placed by RN  SPO2 improved to 90-91%  Will attempt to wean as tolerated   Continue to monitor.    0330 update:   Called by RN as pt was not tolerating NRB and desatted again to 80 (sustained).   Spoke with patient at bedside: he was agitated, rambling, demanding to be transferred and progressed to using profanities.  Provided reassurance and explained the importance of O2.  Pt eventually tolerant of NRB again and O2 improved to 90-92 sustained.   Lights turned off for good sleep hygiene techniques.   Can consider morphine 1mg IVP x1 if he continues to worsen.  Will continue to monitor.

## 2020-12-16 NOTE — PROGRESS NOTE ADULT - ATTENDING COMMENTS
I have personally seen, examined and participated in the care of this patient. I have reviewed all pertinent clinical information, including history, physical exam, plan and agree with the above.   insulin regimen adjusted as pt becoming hypoglycemic with poor PO intake. cont gentle ivf. leukocytosis also increasing, f/u blood cultures. d/w Dr. Eugene. f/u CT chest

## 2020-12-16 NOTE — PROGRESS NOTE ADULT - ASSESSMENT
This 69 y/o male with h/o cad s/p cabg , aicd, htn, dm, on eliquis now as per pt, not sure why he is on it , stated it is his heart related ( used to be on coumadin before ), reports no h/o blood clots, came to the ER brought in by his wife as he has not been feeling well for past 4 days, lack of appetite, generalized weakness and loose watery diarrhea, 3-4 time / day, no blood in stool reported, no abd. pain, no vomiting, some nausea. no sob, no cough, reports no sick contact, no travel, was not on any antibiotics recently, no cp, no dizziness. pt. reported that there has been no diarrhea in the ER for past several hours,  t max in the .7, got Tylenol and at the time of admission 97.9. no other complaints. pt's o2 sat was 90 % RA and 96 % with 2 L. pt. is COVID-19 positive .   I called pt's son Bill at [phone]  to discuss pt's status and meds but there was no answer. Will request day team to contact [facility] to inquire about pt's use of eliquis and indication so his record can be updated, pt. stated that he uses digoxin.  (04 Dec 2020 00:52)    patient confirmed with positive COVID test.   Xray of the chest with bilateral lower lobe infiltrates.     he denies sick contacts.       Impression:  COVID-19 infection   Viral pneumonia  shortness of breath  lung infiltrates  dependence on oxygen    Plan:  - completed Remdesivir IV daily.   -> thru 12/14/2020  x 10 days  - completed dexamethasone course    - SLOW clinical improvement    - Continue supportive care measures  - continue Oxygenation as needed;  CONTINUE to titrate down as tolerated  - self- proning  as tolerated  - ENCOURAGED OOB to chair    - encouraged incentive spirometry - not cooperative    INFLAMM markers are UPTRENDING  please watch closely.   - will repeat cultures from blood.     No further specific infectious disease recommendations. Will be available as needed.

## 2020-12-16 NOTE — PROGRESS NOTE ADULT - SUBJECTIVE AND OBJECTIVE BOX
[facility]  INFECTIOUS DISEASES AND INTERNAL MEDICINE at [place]  =======================================================  Eliezer Greenfield MD  Diplomates American Board of Internal Medicine and Infectious Diseases  Tel  [phone]  Fax [phone]  =======================================================    [id number]  YULI HEADLEY   follow up: COVID-19    still on NRB    POOR appetite, does not want to eat, despite encouragement.     =======================================================    REVIEW OF SYSTEMS:  CONSTITUTIONAL:  No Fever or chills  HEENT:  No diplopia or blurred vision.  No earache, sore throat or runny nose.  CARDIOVASCULAR:  No pressure, squeezing, strangling, tightness, heaviness or aching about the chest, neck, axilla or epigastrium.  RESPIRATORY:  POSITIVE cough, shortness of breath  GASTROINTESTINAL:  No nausea, vomiting or diarrhea.  GENITOURINARY:  No dysuria, frequency or urgency. No Blood in urine  MUSCULOSKELETAL:  no joint aches, no muscle pain  SKIN:  No change in skin, hair or nails.  NEUROLOGIC:  No Headaches, seizures or weakness.  PSYCHIATRIC:  No disorder of thought or mood.  ENDOCRINE:  No heat or cold intolerance  HEMATOLOGICAL:  No easy bruising or bleeding.    =======================================================    Allergies  No Known Allergies    ======================================================  Physical Exam:  ============     General:  No acute distress.  Eye: Pupils are equal, round and reactive to light, Extraocular movements are intact, Normal conjunctiva.  HENT: Normocephalic, Oral mucosa is DRY, No pharyngeal erythema, No sinus tenderness.  Neck: Supple, No lymphadenopathy.  Respiratory: Lungs with COARSE breath sounds bilateral   Cardiovascular: Normal rate, Regular rhythm,   Gastrointestinal: Soft, Non-tender, Non-distended, Normal bowel sounds.  Genitourinary: No costovertebral angle tenderness.  Lymphatics: No lymphadenopathy neck,   Musculoskeletal: Normal range of motion, Normal strength.  Integumentary: No rash.  Neurologic: Alert, Oriented, No focal deficits, Cranial Nerves II-XII are grossly intact.  Psychiatric: Appropriate mood & affect.    =======================================================      Vitals:  ============  T(F): 98.6 (16 Dec 2020 07:48), Max: 98.9 (15 Dec 2020 16:37)  HR: 79 (16 Dec 2020 07:48)  BP: 104/64 (16 Dec 2020 07:48)  RR: 17 (16 Dec 2020 07:48)  SpO2: 96% (16 Dec 2020 07:48) (69% - 100%)  temp max in last 48H T(F): , Max: 98.9 (12-15-20 @ 16:37)    =======================================================  Current Antibiotics:    Other medications:  ALBUTerol    90 MICROgram(s) HFA Inhaler 2 Puff(s) Inhalation every 6 hours  apixaban 5 milliGRAM(s) Oral every 12 hours  ascorbic acid 500 milliGRAM(s) Oral daily  aspirin enteric coated 81 milliGRAM(s) Oral daily  atorvastatin 40 milliGRAM(s) Oral at bedtime  cholecalciferol 1000 Unit(s) Oral daily  dextrose 40% Gel 15 Gram(s) Oral once  dextrose 5% + lactated ringers. 1000 milliLiter(s) IV Continuous <Continuous>  dextrose 5%. 1000 milliLiter(s) IV Continuous <Continuous>  dextrose 5%. 1000 milliLiter(s) IV Continuous <Continuous>  dextrose 50% Injectable 25 Gram(s) IV Push once  dextrose 50% Injectable 12.5 Gram(s) IV Push once  dextrose 50% Injectable 25 Gram(s) IV Push once  digoxin     Tablet 0.125 milliGRAM(s) Oral daily  glucagon  Injectable 1 milliGRAM(s) IntraMuscular once  guaiFENesin  milliGRAM(s) Oral every 12 hours  insulin detemir injectable (LEVEMIR) 5 Unit(s) SubCutaneous at bedtime  insulin lispro (ADMELOG) corrective regimen sliding scale   SubCutaneous three times a day before meals  insulin lispro (ADMELOG) corrective regimen sliding scale   SubCutaneous at bedtime  metoprolol succinate ER 25 milliGRAM(s) Oral daily  multivitamin/minerals 1 Tablet(s) Oral daily      =======================================================  Labs:                        17.2   34.98 )-----------( 191      ( 16 Dec 2020 07:46 )             51.1      12-16    139  |  102  |  53.0<H>  ----------------------------<  59<L>  4.4   |  28.0  |  0.85    Ca    8.7      16 Dec 2020 07:46  Mg     2.5     12-16    TPro  6.7  /  Alb  1.7<L>  /  TBili  1.4  /  DBili  0.2  /  AST  39  /  ALT  17  /  AlkPhos  165<H>  12-16      GI PCR Panel, Stool (collected 12-04-20 @ 21:55)  Source: .Stool Feces  Final Report (12-05-20 @ 02:57):    GI PCR Results: NOT detected    *******Please Note:*******    GI panel PCR evaluates for:    Campylobacter, Plesiomonas shigelloides, Salmonella,    Vibrio, Yersinia enterocolitica, Enteroaggregative    Escherichia coli (EAEC), Enteropathogenic E.coli (EPEC),    Enterotoxigenic E. coli (ETEC) lt/st, Shiga-like    toxin-producing E. coli (STEC) stx1/stx2,    Shigella/ Enteroinvasive E. coli (EIEC), Cryptosporidium,    Cyclospora cayetanensis, Entamoeba histolytica,    Giardia lamblia, Adenovirus F 40/41, Astrovirus,    Norovirus GI/GII, Rotavirus A, Sapovirus    Culture - Stool (collected 12-04-20 @ 21:55)  Source: .Stool Feces  Final Report (12-06-20 @ 16:58):    No enteric pathogens isolated.    (Stool culture examined for Salmonella,    Shigella, Campylobacter, Aeromonas, Plesiomonas,    Vibrio, E.coli O157 and Yersinia)    Culture - Urine (collected 12-04-20 @ 15:05)  Source: .Urine Clean Catch (Midstream)  Final Report (12-05-20 @ 11:24):    <10,000 CFU/mL Normal Urogenital Emily    Culture - Blood (collected 12-04-20 @ 00:54)  Source: .Blood Blood-Peripheral  Final Report (12-09-20 @ 01:00):    No growth at 5 days.    Culture - Blood (collected 12-04-20 @ 00:53)  Source: .Blood Blood-Peripheral  Final Report (12-09-20 @ 01:00):    No growth at 5 days.      Creatinine, Serum: 0.85 mg/dL (12-16-20 @ 07:46)  Creatinine, Serum: 0.97 mg/dL (12-15-20 @ 07:08)  Creatinine, Serum: 0.95 mg/dL (12-15-20 @ 01:12)  Creatinine, Serum: 0.80 mg/dL (12-14-20 @ 19:10)  Creatinine, Serum: 0.82 mg/dL (12-14-20 @ 07:23)  Creatinine, Serum: 0.59 mg/dL (12-13-20 @ 09:50)  Creatinine, Serum: 0.63 mg/dL (12-12-20 @ 06:18)    Procalcitonin, Serum: 0.11 ng/mL (12-15-20 @ 07:08)  Procalcitonin, Serum: 0.04 ng/mL (12-13-20 @ 09:50)  Procalcitonin, Serum: 0.08 ng/mL (12-10-20 @ 13:36)  Procalcitonin, Serum: 0.11 ng/mL (12-03-20 @ 20:28)    D-Dimer Assay, Quantitative: 220 ng/mL DDU (12-15-20 @ 07:40)  D-Dimer Assay, Quantitative: <150 ng/mL DDU (12-09-20 @ 16:24)    Ferritin, Serum: 1398 ng/mL (12-15-20 @ 07:08)  Ferritin, Serum: 978 ng/mL (12-13-20 @ 09:50)  Ferritin, Serum: 823 ng/mL (12-10-20 @ 13:36)  Ferritin, Serum: 1050 ng/mL (12-05-20 @ 06:19)  Ferritin, Serum: 1275 ng/mL (12-03-20 @ 20:28)    C-Reactive Protein, Serum: 5.50 mg/dL (12-15-20 @ 07:08)  C-Reactive Protein, Serum: 1.57 mg/dL (12-13-20 @ 09:50)  C-Reactive Protein, Serum: 1.24 mg/dL (12-10-20 @ 13:36)  C-Reactive Protein, Serum: 0.90 mg/dL (12-05-20 @ 06:19)  C-Reactive Protein, Serum: 1.22 mg/dL (12-03-20 @ 20:28)    WBC Count: 34.98 K/uL (12-16-20 @ 07:46)  WBC Count: 32.17 K/uL (12-15-20 @ 07:08)  WBC Count: 25.72 K/uL (12-14-20 @ 07:23)  WBC Count: 23.22 K/uL (12-13-20 @ 09:50)    SARS-CoV-2: Detected (12-03-20 @ 20:35)  Rapid RVP Result: Detected (12-03-20 @ 20:35)    COVID-19 IgG Antibody Index: <0.10 Index (12-04-20 @ 13:11)  COVID-19 IgG Antibody Interpretation: Negative (12-04-20 @ 13:11)    Lactate Dehydrogenase, Serum: 347 U/L (12-15-20 @ 07:08)  Lactate Dehydrogenase, Serum: 390 U/L (12-13-20 @ 09:50)  Lactate Dehydrogenase, Serum: 324 U/L (12-10-20 @ 13:36)    Alkaline Phosphatase, Serum: 165 U/L (12-16-20 @ 07:46)  Alkaline Phosphatase, Serum: 112 U/L (12-15-20 @ 07:08)  Alkaline Phosphatase, Serum: 112 U/L (12-15-20 @ 07:08)  Alkaline Phosphatase, Serum: 97 U/L (12-14-20 @ 07:23)  Alkaline Phosphatase, Serum: 92 U/L (12-13-20 @ 09:50)  Alkaline Phosphatase, Serum: 84 U/L (12-13-20 @ 09:50)  Alanine Aminotransferase (ALT/SGPT): 17 U/L (12-16-20 @ 07:46)  Alanine Aminotransferase (ALT/SGPT): 20 U/L (12-15-20 @ 07:08)  Alanine Aminotransferase (ALT/SGPT): 20 U/L (12-15-20 @ 07:08)  Alanine Aminotransferase (ALT/SGPT): 21 U/L (12-14-20 @ 07:23)  Alanine Aminotransferase (ALT/SGPT): 23 U/L (12-13-20 @ 09:50)  Alanine Aminotransferase (ALT/SGPT): 23 U/L (12-13-20 @ 09:50)  Aspartate Aminotransferase (AST/SGOT): 39 U/L (12-16-20 @ 07:46)  Aspartate Aminotransferase (AST/SGOT): 18 U/L (12-15-20 @ 07:08)  Aspartate Aminotransferase (AST/SGOT): 19 U/L (12-15-20 @ 07:08)  Aspartate Aminotransferase (AST/SGOT): 15 U/L (12-14-20 @ 07:23)  Aspartate Aminotransferase (AST/SGOT): 19 U/L (12-13-20 @ 09:50)  Aspartate Aminotransferase (AST/SGOT): 18 U/L (12-13-20 @ 09:50)  Bilirubin Total, Serum: 1.4 mg/dL (12-16-20 @ 07:46)  Bilirubin Total, Serum: 1.6 mg/dL (12-15-20 @ 07:08)  Bilirubin Total, Serum: 1.6 mg/dL (12-15-20 @ 07:08)  Bilirubin Total, Serum: 1.2 mg/dL (12-14-20 @ 07:23)  Bilirubin Total, Serum: 1.3 mg/dL (12-13-20 @ 09:50)  Bilirubin Total, Serum: 1.3 mg/dL (12-13-20 @ 09:50)  Bilirubin Direct, Serum: 0.2 mg/dL (12-16-20 @ 07:46)  Bilirubin Direct, Serum: 0.3 mg/dL (12-15-20 @ 07:08)  Bilirubin Direct, Serum: 0.3 mg/dL (12-14-20 @ 07:23)  Bilirubin Direct, Serum: 0.3 mg/dL (12-13-20 @ 09:50)

## 2020-12-17 NOTE — PROGRESS NOTE ADULT - SUBJECTIVE AND OBJECTIVE BOX
YULI HEADLEY    [id number]    70y      Male    CC: weakness    See chart note from yesterday 12/15 afternoon, desated while working with PT, required 6L NC and NRB, eventually improved and weaned back to just NRB overnight    Overnight: pt agitated, taking of NRB. Pt eventually calmed down, proned and NRB replaced.     Pt seen and examined at bedside this am  Laying in bed, no specific complaints, appears weak and tired  Complains of neck pain  On NRB, maintaining sats 96%   Remainder of ROS neg  VSS on NRB    Vital Signs Last 24 Hrs  T(C): 36.8 (17 Dec 2020 08:17), Max: 38.1 (17 Dec 2020 00:35)  T(F): 98.3 (17 Dec 2020 08:17), Max: 100.5 (17 Dec 2020 00:35)  HR: 89 (17 Dec 2020 08:17) (89 - 102)  BP: 112/79 (17 Dec 2020 08:17) (105/70 - 112/79)  BP(mean): --  RR: 21 (17 Dec 2020 08:17) (21 - 25)  SpO2: 90% (17 Dec 2020 08:17) (90% - 96%) on NRB    PHYSICAL EXAM:  GENERAL: NAD, tired, weak   HEENT: +EOMI  NECK: warm, dry  CHEST/LUNG: decreased lung sounds b/l; non-labored respirations on NRB  HEART: S1S2+, Regular rate and rhythm; No murmurs, rubs, or gallops  ABDOMEN: Soft, Nontender, Nondistended; Bowel sounds present  SKIN: warm, dry  NEURO: Awake, alert    LABS/IMAGING: REVIEWED     MEDICATIONS  (STANDING):  ALBUTerol    90 MICROgram(s) HFA Inhaler 2 Puff(s) Inhalation every 6 hours  apixaban 5 milliGRAM(s) Oral every 12 hours  ascorbic acid 500 milliGRAM(s) Oral daily  aspirin enteric coated 81 milliGRAM(s) Oral daily  atorvastatin 40 milliGRAM(s) Oral at bedtime  cholecalciferol 1000 Unit(s) Oral daily  dextrose 40% Gel 15 Gram(s) Oral once  dextrose 5% + lactated ringers. 1000 milliLiter(s) (75 mL/Hr) IV Continuous <Continuous>  dextrose 5%. 1000 milliLiter(s) (50 mL/Hr) IV Continuous <Continuous>  dextrose 5%. 1000 milliLiter(s) (100 mL/Hr) IV Continuous <Continuous>  dextrose 50% Injectable 25 Gram(s) IV Push once  dextrose 50% Injectable 12.5 Gram(s) IV Push once  dextrose 50% Injectable 25 Gram(s) IV Push once  digoxin     Tablet 0.125 milliGRAM(s) Oral daily  glucagon  Injectable 1 milliGRAM(s) IntraMuscular once  guaiFENesin  milliGRAM(s) Oral every 12 hours  insulin lispro (ADMELOG) corrective regimen sliding scale   SubCutaneous three times a day before meals  insulin lispro (ADMELOG) corrective regimen sliding scale   SubCutaneous at bedtime  metoprolol succinate ER 25 milliGRAM(s) Oral daily  multivitamin/minerals 1 Tablet(s) Oral daily    MEDICATIONS  (PRN):  acetaminophen   Tablet .. 650 milliGRAM(s) Oral every 6 hours PRN Temp greater or equal to 38C (100.4F)  ALBUTerol    90 MICROgram(s) HFA Inhaler 2 Puff(s) Inhalation every 4 hours PRN Shortness of Breath and/or Wheezing  haloperidol    Injectable 2.5 milliGRAM(s) IntraMuscular every 6 hours PRN agitation  ondansetron Injectable 4 milliGRAM(s) IV Push every 6 hours PRN Nausea and/or Vomiting         YULI HEADLEY    [id number]    70y      Male    CC: weakness    See chart note from yesterday 12/15 afternoon, desated while working with PT, required 6L NC and NRB, eventually improved and weaned back to just NRB overnight    Overnight: pt agitated, taking of NRB. Pt eventually calmed down, proned and NRB replaced.     Pt seen and examined at bedside this am  Laying in bed, no specific complaints, appears weak and tired  Complains of neck pain  On NRB, maintaining sats 96%   Remainder of ROS neg  VSS on NRB    Vital Signs Last 24 Hrs  T(C): 36.8 (17 Dec 2020 08:17), Max: 38.1 (17 Dec 2020 00:35)  T(F): 98.3 (17 Dec 2020 08:17), Max: 100.5 (17 Dec 2020 00:35) *low grade temp overnight  HR: 89 (17 Dec 2020 08:17) (89 - 102)  BP: 112/79 (17 Dec 2020 08:17) (105/70 - 112/79)  BP(mean): --  RR: 21 (17 Dec 2020 08:17) (21 - 25)  SpO2: 90% (17 Dec 2020 08:17) (90% - 96%) on NRB    PHYSICAL EXAM:  GENERAL: NAD, tired, weak   HEENT: +EOMI  NECK: warm, dry  CHEST/LUNG: decreased lung sounds b/l; non-labored respirations on NRB  HEART: S1S2+, Regular rate and rhythm; No murmurs, rubs, or gallops  ABDOMEN: Soft, Nontender, Nondistended; Bowel sounds present  SKIN: warm, dry  NEURO: Awake, alert    LABS/IMAGING: REVIEWED     MEDICATIONS  (STANDING):  ALBUTerol    90 MICROgram(s) HFA Inhaler 2 Puff(s) Inhalation every 6 hours  apixaban 5 milliGRAM(s) Oral every 12 hours  ascorbic acid 500 milliGRAM(s) Oral daily  aspirin enteric coated 81 milliGRAM(s) Oral daily  atorvastatin 40 milliGRAM(s) Oral at bedtime  cholecalciferol 1000 Unit(s) Oral daily  dextrose 40% Gel 15 Gram(s) Oral once  dextrose 5% + lactated ringers. 1000 milliLiter(s) (75 mL/Hr) IV Continuous <Continuous>  dextrose 5%. 1000 milliLiter(s) (50 mL/Hr) IV Continuous <Continuous>  dextrose 5%. 1000 milliLiter(s) (100 mL/Hr) IV Continuous <Continuous>  dextrose 50% Injectable 25 Gram(s) IV Push once  dextrose 50% Injectable 12.5 Gram(s) IV Push once  dextrose 50% Injectable 25 Gram(s) IV Push once  digoxin     Tablet 0.125 milliGRAM(s) Oral daily  glucagon  Injectable 1 milliGRAM(s) IntraMuscular once  guaiFENesin  milliGRAM(s) Oral every 12 hours  insulin lispro (ADMELOG) corrective regimen sliding scale   SubCutaneous three times a day before meals  insulin lispro (ADMELOG) corrective regimen sliding scale   SubCutaneous at bedtime  metoprolol succinate ER 25 milliGRAM(s) Oral daily  multivitamin/minerals 1 Tablet(s) Oral daily    MEDICATIONS  (PRN):  acetaminophen   Tablet .. 650 milliGRAM(s) Oral every 6 hours PRN Temp greater or equal to 38C (100.4F)  ALBUTerol    90 MICROgram(s) HFA Inhaler 2 Puff(s) Inhalation every 4 hours PRN Shortness of Breath and/or Wheezing  haloperidol    Injectable 2.5 milliGRAM(s) IntraMuscular every 6 hours PRN agitation  ondansetron Injectable 4 milliGRAM(s) IV Push every 6 hours PRN Nausea and/or Vomiting

## 2020-12-17 NOTE — CHART NOTE - NSCHARTNOTEFT_GEN_A_CORE
Pt had recent fever workup.   Chest CT 12/16.  Blood cultures 12/16 still pending . U/A neg. WBC 30. Lactate 2.4. ID following. Pt started on Doxycycline and Meropenem today  Repeat lactate now 3.0  Pt on albuterol Nebs  asymptomatic  Discussed with Dr Singh  Continue to monitor at this time  Repeat Lactate in am

## 2020-12-17 NOTE — PROGRESS NOTE ADULT - SUBJECTIVE AND OBJECTIVE BOX
[facility]  INFECTIOUS DISEASES AND INTERNAL MEDICINE at [place]  =======================================================  Eliezer Greenfield MD  Diplomates American Board of Internal Medicine and Infectious Diseases  Tel  [phone]  Fax [phone]  =======================================================    [id number]  YULI HEADLEY   follow up: COVID-19    remains on NRB  no new complaints    =======================================================    REVIEW OF SYSTEMS:  CONSTITUTIONAL:  No Fever or chills.  POOR appetite  HEENT:  No diplopia or blurred vision.  No earache, sore throat or runny nose.  CARDIOVASCULAR:  No pressure, squeezing, strangling, tightness, heaviness or aching about the chest, neck, axilla or epigastrium.  RESPIRATORY:  POSITIVE cough, shortness of breath  GASTROINTESTINAL:  No nausea, vomiting or diarrhea.  GENITOURINARY:  No dysuria, frequency or urgency. No Blood in urine  MUSCULOSKELETAL:  no joint aches, no muscle pain  SKIN:  No change in skin, hair or nails.  NEUROLOGIC:  No Headaches, seizures or weakness.  PSYCHIATRIC:  No disorder of thought or mood.  ENDOCRINE:  No heat or cold intolerance  HEMATOLOGICAL:  No easy bruising or bleeding.    =======================================================    Allergies  No Known Allergies    ======================================================  Physical Exam:  ============     General:  No acute distress.  Eye: Pupils are equal, round and reactive to light, Extraocular movements are intact, Normal conjunctiva.  HENT: Normocephalic, Oral mucosa is DRY, No pharyngeal erythema, No sinus tenderness.  Neck: Supple, No lymphadenopathy.  Respiratory: Lungs with COARSE breath sounds bilateral   Cardiovascular: Normal rate, Regular rhythm,   Gastrointestinal: Soft, Non-tender, Non-distended, Normal bowel sounds.  Genitourinary: No costovertebral angle tenderness.  Lymphatics: No lymphadenopathy neck,   Musculoskeletal: Normal range of motion, Normal strength.  Integumentary: No rash.  Neurologic: Alert, Oriented, No focal deficits, Cranial Nerves II-XII are grossly intact.  Psychiatric: Appropriate mood & affect.    =======================================================      Vitals:  ============  T(F): 98.6 (16 Dec 2020 07:48), Max: 98.9 (15 Dec 2020 16:37)  HR: 79 (16 Dec 2020 07:48)  BP: 104/64 (16 Dec 2020 07:48)  RR: 17 (16 Dec 2020 07:48)  SpO2: 96% (16 Dec 2020 07:48) (69% - 100%)  temp max in last 48H T(F): , Max: 98.9 (12-15-20 @ 16:37)    =======================================================  Current Antibiotics:    Other medications:  ALBUTerol    90 MICROgram(s) HFA Inhaler 2 Puff(s) Inhalation every 6 hours  apixaban 5 milliGRAM(s) Oral every 12 hours  ascorbic acid 500 milliGRAM(s) Oral daily  aspirin enteric coated 81 milliGRAM(s) Oral daily  atorvastatin 40 milliGRAM(s) Oral at bedtime  cholecalciferol 1000 Unit(s) Oral daily  dextrose 40% Gel 15 Gram(s) Oral once  dextrose 5% + lactated ringers. 1000 milliLiter(s) IV Continuous <Continuous>  dextrose 5%. 1000 milliLiter(s) IV Continuous <Continuous>  dextrose 5%. 1000 milliLiter(s) IV Continuous <Continuous>  dextrose 50% Injectable 25 Gram(s) IV Push once  dextrose 50% Injectable 12.5 Gram(s) IV Push once  dextrose 50% Injectable 25 Gram(s) IV Push once  digoxin     Tablet 0.125 milliGRAM(s) Oral daily  glucagon  Injectable 1 milliGRAM(s) IntraMuscular once  guaiFENesin  milliGRAM(s) Oral every 12 hours  insulin detemir injectable (LEVEMIR) 5 Unit(s) SubCutaneous at bedtime  insulin lispro (ADMELOG) corrective regimen sliding scale   SubCutaneous three times a day before meals  insulin lispro (ADMELOG) corrective regimen sliding scale   SubCutaneous at bedtime  metoprolol succinate ER 25 milliGRAM(s) Oral daily  multivitamin/minerals 1 Tablet(s) Oral daily      =======================================================     Vitals:  ============  T(F): 97.8 (17 Dec 2020 15:32), Max: 100.5 (17 Dec 2020 00:35)  HR: 72 (17 Dec 2020 15:32)  BP: 110/67 (17 Dec 2020 15:32)  RR: 20 (17 Dec 2020 15:32)  SpO2: 98% (17 Dec 2020 15:32) (90% - 98%)  temp max in last 48H T(F): , Max: 100.5 (12-17-20 @ 00:35)    =======================================================  Current Antibiotics:  doxycycline IVPB      doxycycline IVPB 100 milliGRAM(s) IV Intermittent every 12 hours  meropenem  IVPB      meropenem  IVPB 1000 milliGRAM(s) IV Intermittent every 8 hours    Other medications:  ALBUTerol    90 MICROgram(s) HFA Inhaler 2 Puff(s) Inhalation every 6 hours  apixaban 5 milliGRAM(s) Oral every 12 hours  ascorbic acid 500 milliGRAM(s) Oral daily  aspirin enteric coated 81 milliGRAM(s) Oral daily  atorvastatin 40 milliGRAM(s) Oral at bedtime  cholecalciferol 1000 Unit(s) Oral daily  dextrose 40% Gel 15 Gram(s) Oral once  dextrose 5%. 1000 milliLiter(s) IV Continuous <Continuous>  dextrose 5%. 1000 milliLiter(s) IV Continuous <Continuous>  dextrose 50% Injectable 25 Gram(s) IV Push once  dextrose 50% Injectable 12.5 Gram(s) IV Push once  dextrose 50% Injectable 25 Gram(s) IV Push once  digoxin     Tablet 0.125 milliGRAM(s) Oral daily  glucagon  Injectable 1 milliGRAM(s) IntraMuscular once  guaiFENesin  milliGRAM(s) Oral every 12 hours  insulin lispro (ADMELOG) corrective regimen sliding scale   SubCutaneous three times a day before meals  insulin lispro (ADMELOG) corrective regimen sliding scale   SubCutaneous at bedtime  lactated ringers. 1000 milliLiter(s) IV Continuous <Continuous>  metoprolol succinate ER 25 milliGRAM(s) Oral daily  multivitamin/minerals 1 Tablet(s) Oral daily      =======================================================  Labs:                        15.9   30.30 )-----------( 141      ( 17 Dec 2020 07:32 )             47.4      12-17    139  |  105  |  50.0<H>  ----------------------------<  132<H>  4.7   |  22.0  |  0.64    Ca    8.7      17 Dec 2020 07:32  Mg     2.4     12-17    TPro  6.6  /  Alb  2.0<L>  /  TBili  1.5  /  DBili  0.3  /  AST  26  /  ALT  16  /  AlkPhos  93  12-17      GI PCR Panel, Stool (collected 12-04-20 @ 21:55)  Source: .Stool Feces  Final Report (12-05-20 @ 02:57):    GI PCR Results: NOT detected    *******Please Note:*******    GI panel PCR evaluates for:    Campylobacter, Plesiomonas shigelloides, Salmonella,    Vibrio, Yersinia enterocolitica, Enteroaggregative    Escherichia coli (EAEC), Enteropathogenic E.coli (EPEC),    Enterotoxigenic E. coli (ETEC) lt/st, Shiga-like    toxin-producing E. coli (STEC) stx1/stx2,    Shigella/ Enteroinvasive E. coli (EIEC), Cryptosporidium,    Cyclospora cayetanensis, Entamoeba histolytica,    Giardia lamblia, Adenovirus F 40/41, Astrovirus,    Norovirus GI/GII, Rotavirus A, Sapovirus    Culture - Stool (collected 12-04-20 @ 21:55)  Source: .Stool Feces  Final Report (12-06-20 @ 16:58):    No enteric pathogens isolated.    (Stool culture examined for Salmonella,    Shigella, Campylobacter, Aeromonas, Plesiomonas,    Vibrio, E.coli O157 and Yersinia)    Culture - Urine (collected 12-04-20 @ 15:05)  Source: .Urine Clean Catch (Midstream)  Final Report (12-05-20 @ 11:24):    <10,000 CFU/mL Normal Urogenital Emily    Culture - Blood (collected 12-04-20 @ 00:54)  Source: .Blood Blood-Peripheral  Final Report (12-09-20 @ 01:00):    No growth at 5 days.    Culture - Blood (collected 12-04-20 @ 00:53)  Source: .Blood Blood-Peripheral  Final Report (12-09-20 @ 01:00):    No growth at 5 days.      Creatinine, Serum: 0.64 mg/dL (12-17-20 @ 07:32)  Creatinine, Serum: 0.85 mg/dL (12-16-20 @ 07:46)  Creatinine, Serum: 0.97 mg/dL (12-15-20 @ 07:08)  Creatinine, Serum: 0.95 mg/dL (12-15-20 @ 01:12)  Creatinine, Serum: 0.80 mg/dL (12-14-20 @ 19:10)  Creatinine, Serum: 0.82 mg/dL (12-14-20 @ 07:23)  Creatinine, Serum: 0.59 mg/dL (12-13-20 @ 09:50)    Procalcitonin, Serum: 0.54 ng/mL (12-17-20 @ 07:32)  Procalcitonin, Serum: 0.11 ng/mL (12-15-20 @ 07:08)  Procalcitonin, Serum: 0.04 ng/mL (12-13-20 @ 09:50)  Procalcitonin, Serum: 0.08 ng/mL (12-10-20 @ 13:36)  Procalcitonin, Serum: 0.11 ng/mL (12-03-20 @ 20:28)    D-Dimer Assay, Quantitative: 170 ng/mL DDU (12-17-20 @ 10:45)  D-Dimer Assay, Quantitative: 220 ng/mL DDU (12-15-20 @ 07:40)  D-Dimer Assay, Quantitative: <150 ng/mL DDU (12-09-20 @ 16:24)    Ferritin, Serum: 2557 ng/mL (12-17-20 @ 07:32)  Ferritin, Serum: 1398 ng/mL (12-15-20 @ 07:08)  Ferritin, Serum: 978 ng/mL (12-13-20 @ 09:50)  Ferritin, Serum: 823 ng/mL (12-10-20 @ 13:36)  Ferritin, Serum: 1050 ng/mL (12-05-20 @ 06:19)  Ferritin, Serum: 1275 ng/mL (12-03-20 @ 20:28)    C-Reactive Protein, Serum: 15.69 mg/dL (12-17-20 @ 07:32)  C-Reactive Protein, Serum: 5.50 mg/dL (12-15-20 @ 07:08)  C-Reactive Protein, Serum: 1.57 mg/dL (12-13-20 @ 09:50)  C-Reactive Protein, Serum: 1.24 mg/dL (12-10-20 @ 13:36)  C-Reactive Protein, Serum: 0.90 mg/dL (12-05-20 @ 06:19)  C-Reactive Protein, Serum: 1.22 mg/dL (12-03-20 @ 20:28)    WBC Count: 30.30 K/uL (12-17-20 @ 07:32)  WBC Count: 34.98 K/uL (12-16-20 @ 07:46)  WBC Count: 32.17 K/uL (12-15-20 @ 07:08)  WBC Count: 25.72 K/uL (12-14-20 @ 07:23)  WBC Count: 23.22 K/uL (12-13-20 @ 09:50)    SARS-CoV-2: Detected (12-03-20 @ 20:35)  Rapid RVP Result: Detected (12-03-20 @ 20:35)    COVID-19 IgG Antibody Index: <0.10 Index (12-04-20 @ 13:11)  COVID-19 IgG Antibody Interpretation: Negative (12-04-20 @ 13:11)    Lactate Dehydrogenase, Serum: 411 U/L (12-17-20 @ 07:32)  Lactate Dehydrogenase, Serum: 347 U/L (12-15-20 @ 07:08)  Lactate Dehydrogenase, Serum: 390 U/L (12-13-20 @ 09:50)  Lactate Dehydrogenase, Serum: 324 U/L (12-10-20 @ 13:36)    Alkaline Phosphatase, Serum: 93 U/L (12-17-20 @ 07:32)  Alkaline Phosphatase, Serum: 93 U/L (12-17-20 @ 07:32)  Alkaline Phosphatase, Serum: 165 U/L (12-16-20 @ 07:46)  Alkaline Phosphatase, Serum: 112 U/L (12-15-20 @ 07:08)  Alkaline Phosphatase, Serum: 112 U/L (12-15-20 @ 07:08)  Alkaline Phosphatase, Serum: 97 U/L (12-14-20 @ 07:23)  Alanine Aminotransferase (ALT/SGPT): 16 U/L (12-17-20 @ 07:32)  Alanine Aminotransferase (ALT/SGPT): 16 U/L (12-17-20 @ 07:32)  Alanine Aminotransferase (ALT/SGPT): 17 U/L (12-16-20 @ 07:46)  Alanine Aminotransferase (ALT/SGPT): 20 U/L (12-15-20 @ 07:08)  Alanine Aminotransferase (ALT/SGPT): 20 U/L (12-15-20 @ 07:08)  Alanine Aminotransferase (ALT/SGPT): 21 U/L (12-14-20 @ 07:23)  Aspartate Aminotransferase (AST/SGOT): 26 U/L (12-17-20 @ 07:32)  Aspartate Aminotransferase (AST/SGOT): 26 U/L (12-17-20 @ 07:32)  Aspartate Aminotransferase (AST/SGOT): 39 U/L (12-16-20 @ 07:46)  Aspartate Aminotransferase (AST/SGOT): 18 U/L (12-15-20 @ 07:08)  Aspartate Aminotransferase (AST/SGOT): 19 U/L (12-15-20 @ 07:08)  Aspartate Aminotransferase (AST/SGOT): 15 U/L (12-14-20 @ 07:23)  Bilirubin Total, Serum: 1.5 mg/dL (12-17-20 @ 07:32)  Bilirubin Total, Serum: 1.5 mg/dL (12-17-20 @ 07:32)  Bilirubin Total, Serum: 1.4 mg/dL (12-16-20 @ 07:46)  Bilirubin Total, Serum: 1.6 mg/dL (12-15-20 @ 07:08)  Bilirubin Total, Serum: 1.6 mg/dL (12-15-20 @ 07:08)  Bilirubin Total, Serum: 1.2 mg/dL (12-14-20 @ 07:23)  Bilirubin Direct, Serum: 0.3 mg/dL (12-17-20 @ 07:32)  Bilirubin Direct, Serum: 0.2 mg/dL (12-16-20 @ 07:46)  Bilirubin Direct, Serum: 0.3 mg/dL (12-15-20 @ 07:08)  Bilirubin Direct, Serum: 0.3 mg/dL (12-14-20 @ 07:23)

## 2020-12-17 NOTE — PROGRESS NOTE ADULT - ASSESSMENT
This 71 y/o male with h/o cad s/p cabg , aicd, htn, dm, on eliquis now as per pt, not sure why he is on it , stated it is his heart related ( used to be on coumadin before ), reports no h/o blood clots, came to the ER brought in by his wife as he has not been feeling well for past 4 days, lack of appetite, generalized weakness and loose watery diarrhea, 3-4 time / day, no blood in stool reported, no abd. pain, no vomiting, some nausea. no sob, no cough, reports no sick contact, no travel, was not on any antibiotics recently, no cp, no dizziness. pt. reported that there has been no diarrhea in the ER for past several hours,  t max in the .7, got Tylenol and at the time of admission 97.9. no other complaints. pt's o2 sat was 90 % RA and 96 % with 2 L. pt. is COVID-19 positive .   I called pt's son Bill at [phone]  to discuss pt's status and meds but there was no answer. Will request day team to contact [facility] to inquire about pt's use of eliquis and indication so his record can be updated, pt. stated that he uses digoxin.  (04 Dec 2020 00:52)    patient confirmed with positive COVID test.   Xray of the chest with bilateral lower lobe infiltrates.     he denies sick contacts.       Impression:  COVID-19 infection   Viral pneumonia  shortness of breath  lung infiltrates  dependence on oxygen    Plan:  - completed Remdesivir IV daily.   -> thru 12/14/2020  x 10 days  - completed dexamethasone course    - SLOW clinical improvement  INFLAMM markers are still elevated   repeat cultures from blood sent  CT chest with worsening  DOXY AND MERREM STARTED 12/17        - Continue supportive care measures  - continue Oxygenation as needed;  CONTINUE to titrate down as tolerated  - self- proning  as tolerated  - ENCOURAGED OOB to chair

## 2020-12-17 NOTE — PROGRESS NOTE ADULT - ATTENDING COMMENTS
I have personally seen, examined and participated in the care of this patient. I have reviewed all pertinent clinical information, including history, physical exam, plan and agree with the above.   repeat ct scan with new infiltrates. blood cx pending. started on doxycycline, merrem. mildly elevated lactate but due to setting of covid and has been on maintenance fluids, will defer 30cc/kg bolus at this time. repeat lactate in pm. IVF rate increased.

## 2020-12-17 NOTE — PROGRESS NOTE ADULT - ASSESSMENT
70y/oM PMH HTN, HLD, DM, CAD s/p CABG, AICD presenting with weakness, diarrhea, anorexia x4 days, admitted with COVID-19 PNA     1. Acute hypoxic respiratory failure 2/2 COVID-19 PNA   -ID recs appreciated   -Completed remdesivir x 10 days  -C/w dexamethasone  -Slow clinical improvement, still on NRB, wean o2 as able (required 6L NC in addition to NRB yesterday 12/15)  -Repeat CXR 12/15 w/ no signifcant new changes, ddimer also repeated and wnl at 220 (prior ddimer 150)  -Repeat CT chest 12/16, poss superimposed PNA also in the setting of acute rise of inflammatory markers.   -Discussed with ID, will start pt on merrem and doxy  -Repeat inflammatory markers 12/19  -C/w full dose AC w/ Eliquis  -Cont supportive care, prone as tolerated     2. Thrombocytopenia, Lymphopenia   -Likely 2/2 viral infection   -Resolved    3. Hyperkalemia   -Now resolved, continue to hold lisinopril as BP stable off meds currently  -Monitor BP    4. CAD s/p CABG, HTN, HLD  -Cont metoprolol, statin, asa  -Lisinopril on hold due to hyperkalemia and BP stable w/o it currently. SBP on low side    5. DM2   -With hypoglycemia as pt with decreased appetite  -Will start maintenance fluids w/ D5  -HgbA1c 6.3   -ISS  -Levemir 5 QHS    6. Suicidal thoughts   -Pt currently denies  -Psych recs appreciated  -No psych contraindications to d/c    7. Paroxysmal afib   -Cont eliquis, metoprolol, digoxin     DVT ppx: Eliquis     Dispo: pending clinical improvement, still requiring NRB    attempted to call son, no answer          70y/oM PMH HTN, HLD, DM, CAD s/p CABG, AICD presenting with weakness, diarrhea, anorexia x4 days, admitted with COVID-19 PNA     1. Acute hypoxic respiratory failure 2/2 COVID-19 PNA   -ID recs appreciated   -Completed remdesivir x 10 days  -C/w dexamethasone  -Slow clinical improvement, still on NRB, wean o2 as able (required 6L NC in addition to NRB yesterday 12/15)  -Repeat CXR 12/15 w/ no signifcant new changes, ddimer also repeated and wnl at 220 (prior ddimer 150)  -Repeat CT chest 12/16, poss superimposed PNA also in the setting of acute rise of inflammatory markers.   -Discussed with ID, will start pt on merrem and doxy  -Repeat inflammatory markers 12/19  -C/w full dose AC w/ Eliquis  -Cont supportive care, prone as tolerated     2. Thrombocytopenia, Lymphopenia   -Likely 2/2 viral infection   -Resolved    3. Hyperkalemia   -Now resolved, continue to hold lisinopril as BP stable off meds currently  -Monitor BP    4. CAD s/p CABG, HTN, HLD  -Cont metoprolol, statin, asa  -Lisinopril on hold due to hyperkalemia and BP stable w/o it currently. SBP on low side    5. DM2   -With hypoglycemia as pt with decreased appetite  -Will start maintenance fluids w/ D5  -HgbA1c 6.3   -ISS  -Levemir 5 QHS    6. Suicidal thoughts   -Pt currently denies  -Psych recs appreciated  -No psych contraindications to d/c    7. Paroxysmal afib   -Cont eliquis, metoprolol, digoxin     DVT ppx: Eliquis     Dispo: pending clinical improvement, still requiring NRB    attempted to call son again today 12/17, no answer

## 2020-12-18 NOTE — CHART NOTE - NSCHARTNOTEFT_GEN_A_CORE
Pt h/o afib. On Eliquis, ASA, Lopressor, dig.   Pt noted to now be in aflutter on monitor  Asymptomatic, NAD, No complaints  MG 2.4 K 4.7  VSS B/P 108/57 P 91 T 98.4 PO 95% R 21  EKG stat-- Aflutter rate 84  Discussed with Dr Singh  Asymptomatic, Rate controlled  Will continue to monitor   BMP and MG in am

## 2020-12-18 NOTE — PROGRESS NOTE ADULT - ASSESSMENT
70y/oM PMH HTN, HLD, DM, CAD s/p CABG, AICD presenting with weakness, diarrhea, anorexia x4 days, admitted with COVID-19 PNA     Acute hypoxic respiratory failure 2/2 COVID-19 PNA   -ID recs appreciated   -Completed remdesivir x 10 days  -C/w dexamethasone  -Slow clinical improvement, still on NRB, wean o2 as able (required 6L NC in addition to NRB yesterday 12/15)  -Repeat CXR 12/15 w/ no signifcant new changes, ddimer also repeated and wnl at 220 (prior ddimer 150)  -Repeat CT chest 12/16, poss superimposed PNA also in the setting of acute rise of inflammatory markers.   -Discussed with ID, will start pt on merrem and doxy 12/17  -Repeat inflammatory markers 12/19  -C/w full dose AC w/ Eliquis  -Cont supportive care, prone as tolerated   -f/u blood cx    Thrombocytopenia, Lymphopenia   -Likely 2/2 viral infection   -Resolved    Hyperkalemia   -Now resolved, continue to hold lisinopril as BP stable off meds currently  -Monitor BP    CAD s/p CABG, HTN, HLD  -Cont metoprolol, statin, asa  -Lisinopril on hold due to hyperkalemia and BP stable w/o it currently. SBP on low side    DM2   -With hypoglycemia as pt with decreased appetite  -Will start maintenance fluids w/ D5  -HgbA1c 6.3   -ISS  -Levemir 5 QHS    Suicidal thoughts   -Pt currently denies  -Psych recs appreciated  -No psych contraindications to d/c    Paroxysmal afib   rapid rate o/n and 12/18  -Cont eliquis, metoprolol, digoxin   -prn cardizem     DVT ppx: Eliquis     Dispo: pending clinical improvement, still requiring NRB             70y/oM PMH HTN, HLD, DM, CAD s/p CABG, AICD presenting with weakness, diarrhea, anorexia x4 days, admitted with COVID-19 PNA     Acute hypoxic respiratory failure 2/2 COVID-19 PNA   -ID recs appreciated   -Completed remdesivir x 10 days  -C/w dexamethasone  -Slow clinical improvement, still on NRB, wean o2 as able (required 6L NC in addition to NRB yesterday 12/15)  -Repeat CXR 12/15 w/ no signifcant new changes, ddimer also repeated and wnl at 220 (prior ddimer 150)  -Repeat CT chest 12/16, poss superimposed PNA also in the setting of acute rise of inflammatory markers.   -Discussed with ID, will start pt on merrem and doxy 12/17  -Repeat inflammatory markers 12/19  -C/w full dose AC w/ Eliquis  -Cont supportive care, prone as tolerated   -f/u blood cx    Thrombocytopenia, Lymphopenia   -Likely 2/2 viral infection   -Resolved    Hyperkalemia   -Now resolved, continue to hold lisinopril as BP stable off meds currently  -Monitor BP    CAD s/p CABG, HTN, HLD  -Cont metoprolol, statin, asa  -Lisinopril on hold due to hyperkalemia and BP stable w/o it currently. SBP on low side    DM2   -With hypoglycemia as pt with decreased appetite  -Will start maintenance fluids w/ D5  -HgbA1c 6.3   -ISS  -Levemir 5 QHS    Suicidal thoughts   -Pt currently denies  -Psych recs appreciated  -No psych contraindications to d/c    Paroxysmal afib   rapid rate o/n and 12/18  -Cont eliquis, metoprolol, digoxin   -prn cardizem     DVT ppx: Eliquis     Dispo: pending clinical improvement, now on HFNC  prognosis guarded     VM left for pt's wife, Yakelin, [phone]

## 2020-12-18 NOTE — PROGRESS NOTE ADULT - SUBJECTIVE AND OBJECTIVE BOX
YULI HEADELY    [id number]    70y      Male    CC: weakness    INTERVAL HPI/OVERNIGHT EVENTS: pt seen and examined. on NRB    REVIEW OF SYSTEMS:    CONSTITUTIONAL: No weight loss  RESPIRATORY: No wheezing, hemoptysis  CARDIOVASCULAR: No chest pain, palpitations  GASTROINTESTINAL: No abdominal or epigastric pain. No nausea, vomiting  NEUROLOGICAL: No headaches    Vital Signs Last 24 Hrs  T(C): 36.8 (18 Dec 2020 15:21), Max: 37.1 (18 Dec 2020 04:59)  T(F): 98.3 (18 Dec 2020 15:21), Max: 98.8 (18 Dec 2020 04:59)  HR: 104 (18 Dec 2020 17:00) (87 - 152)  BP: 97/64 (18 Dec 2020 15:21) (90/60 - 113/68)  BP(mean): 69 (18 Dec 2020 09:51) (69 - 69)  RR: 25 (18 Dec 2020 17:00) (18 - 32)  SpO2: 94% (18 Dec 2020 17:00) (89% - 97%)    PHYSICAL EXAM:  GENERAL: NAD, tired, weak   HEENT: +EOMI  NECK: warm, dry  CHEST/LUNG: decreased lung sounds b/l; non-labored respirations on NRB  HEART: S1S2+, Regular rate and rhythm; No murmurs, rubs, or gallops  ABDOMEN: Soft, Nontender, Nondistended; Bowel sounds present  SKIN: warm, dry  NEURO: Awake, alert    LABS:                        15.0   21.22 )-----------( 115      ( 18 Dec 2020 07:11 )             44.9     12-18    135  |  103  |  32.0<H>  ----------------------------<  132<H>  4.6   |  24.0  |  0.48<L>    Ca    8.1<L>      18 Dec 2020 07:11  Mg     2.0     12-18    TPro  5.8<L>  /  Alb  1.9<L>  /  TBili  1.1  /  DBili  0.3  /  AST  25  /  ALT  20  /  AlkPhos  87  12-18            MEDICATIONS  (STANDING):  ALBUTerol    90 MICROgram(s) HFA Inhaler 2 Puff(s) Inhalation every 6 hours  apixaban 5 milliGRAM(s) Oral every 12 hours  ascorbic acid 500 milliGRAM(s) Oral daily  aspirin enteric coated 81 milliGRAM(s) Oral daily  atorvastatin 40 milliGRAM(s) Oral at bedtime  cholecalciferol 1000 Unit(s) Oral daily  dextrose 40% Gel 15 Gram(s) Oral once  dextrose 5%. 1000 milliLiter(s) (50 mL/Hr) IV Continuous <Continuous>  dextrose 5%. 1000 milliLiter(s) (100 mL/Hr) IV Continuous <Continuous>  dextrose 50% Injectable 25 Gram(s) IV Push once  dextrose 50% Injectable 12.5 Gram(s) IV Push once  dextrose 50% Injectable 25 Gram(s) IV Push once  digoxin     Tablet 0.125 milliGRAM(s) Oral daily  doxycycline IVPB 100 milliGRAM(s) IV Intermittent every 12 hours  doxycycline IVPB      glucagon  Injectable 1 milliGRAM(s) IntraMuscular once  guaiFENesin  milliGRAM(s) Oral every 12 hours  insulin lispro (ADMELOG) corrective regimen sliding scale   SubCutaneous three times a day before meals  insulin lispro (ADMELOG) corrective regimen sliding scale   SubCutaneous at bedtime  meropenem  IVPB 1000 milliGRAM(s) IV Intermittent every 8 hours  meropenem  IVPB      metoprolol succinate ER 25 milliGRAM(s) Oral daily  multivitamin/minerals 1 Tablet(s) Oral daily    MEDICATIONS  (PRN):  acetaminophen   Tablet .. 650 milliGRAM(s) Oral every 6 hours PRN Temp greater or equal to 38C (100.4F)  acetaminophen   Tablet .. 650 milliGRAM(s) Oral every 6 hours PRN Mild Pain (1 - 3)  ALBUTerol    90 MICROgram(s) HFA Inhaler 2 Puff(s) Inhalation every 4 hours PRN Shortness of Breath and/or Wheezing  diltiazem Injectable 10 milliGRAM(s) IV Push every 8 hours PRN for HR>110  haloperidol    Injectable 2.5 milliGRAM(s) IntraMuscular every 6 hours PRN agitation  LORazepam   Injectable 0.5 milliGRAM(s) IV Push three times a day PRN Anxiety  ondansetron Injectable 4 milliGRAM(s) IV Push every 6 hours PRN Nausea and/or Vomiting      RADIOLOGY & ADDITIONAL TESTS:   YULI HEADLEY    [id number]    70y      Male    CC: weakness    INTERVAL HPI/OVERNIGHT EVENTS: pt seen and examined. seen on NRB, increased to HFNC later in the day    REVIEW OF SYSTEMS:    CONSTITUTIONAL: No weight loss  RESPIRATORY: No wheezing, hemoptysis  CARDIOVASCULAR: No chest pain, palpitations  GASTROINTESTINAL: No abdominal or epigastric pain. No nausea, vomiting  NEUROLOGICAL: No headaches    Vital Signs Last 24 Hrs  T(C): 36.8 (18 Dec 2020 15:21), Max: 37.1 (18 Dec 2020 04:59)  T(F): 98.3 (18 Dec 2020 15:21), Max: 98.8 (18 Dec 2020 04:59)  HR: 104 (18 Dec 2020 17:00) (87 - 152)  BP: 97/64 (18 Dec 2020 15:21) (90/60 - 113/68)  BP(mean): 69 (18 Dec 2020 09:51) (69 - 69)  RR: 25 (18 Dec 2020 17:00) (18 - 32)  SpO2: 94% (18 Dec 2020 17:00) (89% - 97%)    PHYSICAL EXAM:  GENERAL: NAD, tired, weak   HEENT: +EOMI  NECK: warm, dry  CHEST/LUNG: decreased lung sounds b/l; tachypneic on NRB  HEART: S1S2+, irregularly irregular   ABDOMEN: Soft, Nontender, Nondistended; Bowel sounds present  SKIN: warm, dry  NEURO: Awake, alert    LABS:                        15.0   21.22 )-----------( 115      ( 18 Dec 2020 07:11 )             44.9     12-18    135  |  103  |  32.0<H>  ----------------------------<  132<H>  4.6   |  24.0  |  0.48<L>    Ca    8.1<L>      18 Dec 2020 07:11  Mg     2.0     12-18    TPro  5.8<L>  /  Alb  1.9<L>  /  TBili  1.1  /  DBili  0.3  /  AST  25  /  ALT  20  /  AlkPhos  87  12-18            MEDICATIONS  (STANDING):  ALBUTerol    90 MICROgram(s) HFA Inhaler 2 Puff(s) Inhalation every 6 hours  apixaban 5 milliGRAM(s) Oral every 12 hours  ascorbic acid 500 milliGRAM(s) Oral daily  aspirin enteric coated 81 milliGRAM(s) Oral daily  atorvastatin 40 milliGRAM(s) Oral at bedtime  cholecalciferol 1000 Unit(s) Oral daily  dextrose 40% Gel 15 Gram(s) Oral once  dextrose 5%. 1000 milliLiter(s) (50 mL/Hr) IV Continuous <Continuous>  dextrose 5%. 1000 milliLiter(s) (100 mL/Hr) IV Continuous <Continuous>  dextrose 50% Injectable 25 Gram(s) IV Push once  dextrose 50% Injectable 12.5 Gram(s) IV Push once  dextrose 50% Injectable 25 Gram(s) IV Push once  digoxin     Tablet 0.125 milliGRAM(s) Oral daily  doxycycline IVPB 100 milliGRAM(s) IV Intermittent every 12 hours  doxycycline IVPB      glucagon  Injectable 1 milliGRAM(s) IntraMuscular once  guaiFENesin  milliGRAM(s) Oral every 12 hours  insulin lispro (ADMELOG) corrective regimen sliding scale   SubCutaneous three times a day before meals  insulin lispro (ADMELOG) corrective regimen sliding scale   SubCutaneous at bedtime  meropenem  IVPB 1000 milliGRAM(s) IV Intermittent every 8 hours  meropenem  IVPB      metoprolol succinate ER 25 milliGRAM(s) Oral daily  multivitamin/minerals 1 Tablet(s) Oral daily    MEDICATIONS  (PRN):  acetaminophen   Tablet .. 650 milliGRAM(s) Oral every 6 hours PRN Temp greater or equal to 38C (100.4F)  acetaminophen   Tablet .. 650 milliGRAM(s) Oral every 6 hours PRN Mild Pain (1 - 3)  ALBUTerol    90 MICROgram(s) HFA Inhaler 2 Puff(s) Inhalation every 4 hours PRN Shortness of Breath and/or Wheezing  diltiazem Injectable 10 milliGRAM(s) IV Push every 8 hours PRN for HR>110  haloperidol    Injectable 2.5 milliGRAM(s) IntraMuscular every 6 hours PRN agitation  LORazepam   Injectable 0.5 milliGRAM(s) IV Push three times a day PRN Anxiety  ondansetron Injectable 4 milliGRAM(s) IV Push every 6 hours PRN Nausea and/or Vomiting      RADIOLOGY & ADDITIONAL TESTS:

## 2020-12-18 NOTE — PROGRESS NOTE ADULT - SUBJECTIVE AND OBJECTIVE BOX
[facility]  INFECTIOUS DISEASES AND INTERNAL MEDICINE at [place]  =======================================================  Eliezer Greenfield MD  Diplomates American Board of Internal Medicine and Infectious Diseases  Tel  [phone]  Fax [phone]  =======================================================    [id number]  YULI HEADLEY   follow up: COVID-19    remains on NRB  no new complaints    =======================================================    REVIEW OF SYSTEMS:  CONSTITUTIONAL:  No Fever or chills.  POOR appetite  HEENT:  No diplopia or blurred vision.  No earache, sore throat or runny nose.  CARDIOVASCULAR:  No pressure, squeezing, strangling, tightness, heaviness or aching about the chest, neck, axilla or epigastrium.  RESPIRATORY:  POSITIVE cough, shortness of breath  GASTROINTESTINAL:  No nausea, vomiting or diarrhea.  GENITOURINARY:  No dysuria, frequency or urgency. No Blood in urine  MUSCULOSKELETAL:  no joint aches, no muscle pain  SKIN:  No change in skin, hair or nails.  NEUROLOGIC:  No Headaches, seizures or weakness.  PSYCHIATRIC:  No disorder of thought or mood.  ENDOCRINE:  No heat or cold intolerance  HEMATOLOGICAL:  No easy bruising or bleeding.    =======================================================    Allergies  No Known Allergies    ======================================================  Physical Exam:  ============     General:  No acute distress.  Eye: Pupils are equal, round and reactive to light, Extraocular movements are intact, Normal conjunctiva.  HENT: Normocephalic, Oral mucosa is DRY, No pharyngeal erythema, No sinus tenderness.  Neck: Supple, No lymphadenopathy.  Respiratory: Lungs with COARSE breath sounds bilateral   Cardiovascular: Normal rate, Regular rhythm,   Gastrointestinal: Soft, Non-tender, Non-distended, Normal bowel sounds.  Genitourinary: No costovertebral angle tenderness.  Lymphatics: No lymphadenopathy neck,   Musculoskeletal: Normal range of motion, Normal strength.  Integumentary: No rash.  Neurologic: Alert, Oriented, No focal deficits, Cranial Nerves II-XII are grossly intact.  Psychiatric: Appropriate mood & affect.    =======================================================   Vitals:  ============  T(F): 98.3 (18 Dec 2020 15:21), Max: 98.8 (18 Dec 2020 04:59)  HR: 108 (18 Dec 2020 15:21)  BP: 97/64 (18 Dec 2020 15:21)  RR: 27 (18 Dec 2020 15:21)  SpO2: 90% (18 Dec 2020 15:21) (90% - 98%)  temp max in last 48H T(F): , Max: 100.5 (12-17-20 @ 00:35)    =======================================================  Current Antibiotics:  doxycycline IVPB 100 milliGRAM(s) IV Intermittent every 12 hours  doxycycline IVPB      meropenem  IVPB 1000 milliGRAM(s) IV Intermittent every 8 hours  meropenem  IVPB        Other medications:  ALBUTerol    90 MICROgram(s) HFA Inhaler 2 Puff(s) Inhalation every 6 hours  apixaban 5 milliGRAM(s) Oral every 12 hours  ascorbic acid 500 milliGRAM(s) Oral daily  aspirin enteric coated 81 milliGRAM(s) Oral daily  atorvastatin 40 milliGRAM(s) Oral at bedtime  cholecalciferol 1000 Unit(s) Oral daily  dextrose 40% Gel 15 Gram(s) Oral once  dextrose 5%. 1000 milliLiter(s) IV Continuous <Continuous>  dextrose 5%. 1000 milliLiter(s) IV Continuous <Continuous>  dextrose 50% Injectable 25 Gram(s) IV Push once  dextrose 50% Injectable 12.5 Gram(s) IV Push once  dextrose 50% Injectable 25 Gram(s) IV Push once  digoxin     Tablet 0.125 milliGRAM(s) Oral daily  glucagon  Injectable 1 milliGRAM(s) IntraMuscular once  guaiFENesin  milliGRAM(s) Oral every 12 hours  insulin lispro (ADMELOG) corrective regimen sliding scale   SubCutaneous three times a day before meals  insulin lispro (ADMELOG) corrective regimen sliding scale   SubCutaneous at bedtime  metoprolol succinate ER 25 milliGRAM(s) Oral daily  multivitamin/minerals 1 Tablet(s) Oral daily      =======================================================  Labs:                        15.0   21.22 )-----------( 115      ( 18 Dec 2020 07:11 )             44.9      12-18    135  |  103  |  32.0<H>  ----------------------------<  132<H>  4.6   |  24.0  |  0.48<L>    Ca    8.1<L>      18 Dec 2020 07:11  Mg     2.0     12-18    TPro  5.8<L>  /  Alb  1.9<L>  /  TBili  1.1  /  DBili  0.3  /  AST  25  /  ALT  20  /  AlkPhos  87  12-18      GI PCR Panel, Stool (collected 12-04-20 @ 21:55)  Source: .Stool Feces  Final Report (12-05-20 @ 02:57):    GI PCR Results: NOT detected    *******Please Note:*******    GI panel PCR evaluates for:    Campylobacter, Plesiomonas shigelloides, Salmonella,    Vibrio, Yersinia enterocolitica, Enteroaggregative    Escherichia coli (EAEC), Enteropathogenic E.coli (EPEC),    Enterotoxigenic E. coli (ETEC) lt/st, Shiga-like    toxin-producing E. coli (STEC) stx1/stx2,    Shigella/ Enteroinvasive E. coli (EIEC), Cryptosporidium,    Cyclospora cayetanensis, Entamoeba histolytica,    Giardia lamblia, Adenovirus F 40/41, Astrovirus,    Norovirus GI/GII, Rotavirus A, Sapovirus    Culture - Stool (collected 12-04-20 @ 21:55)  Source: .Stool Feces  Final Report (12-06-20 @ 16:58):    No enteric pathogens isolated.    (Stool culture examined for Salmonella,    Shigella, Campylobacter, Aeromonas, Plesiomonas,    Vibrio, E.coli O157 and Yersinia)      Creatinine, Serum: 0.48 mg/dL (12-18-20 @ 07:11)  Creatinine, Serum: 0.64 mg/dL (12-17-20 @ 07:32)  Creatinine, Serum: 0.85 mg/dL (12-16-20 @ 07:46)  Creatinine, Serum: 0.97 mg/dL (12-15-20 @ 07:08)  Creatinine, Serum: 0.95 mg/dL (12-15-20 @ 01:12)  Creatinine, Serum: 0.80 mg/dL (12-14-20 @ 19:10)  Creatinine, Serum: 0.82 mg/dL (12-14-20 @ 07:23)    Procalcitonin, Serum: 0.32 ng/mL (12-18-20 @ 07:11)  Procalcitonin, Serum: 0.54 ng/mL (12-17-20 @ 07:32)  Procalcitonin, Serum: 0.11 ng/mL (12-15-20 @ 07:08)  Procalcitonin, Serum: 0.04 ng/mL (12-13-20 @ 09:50)  Procalcitonin, Serum: 0.08 ng/mL (12-10-20 @ 13:36)    D-Dimer Assay, Quantitative: 170 ng/mL DDU (12-17-20 @ 10:45)  D-Dimer Assay, Quantitative: 220 ng/mL DDU (12-15-20 @ 07:40)  D-Dimer Assay, Quantitative: <150 ng/mL DDU (12-09-20 @ 16:24)    Ferritin, Serum: 2557 ng/mL (12-17-20 @ 07:32)  Ferritin, Serum: 1398 ng/mL (12-15-20 @ 07:08)  Ferritin, Serum: 978 ng/mL (12-13-20 @ 09:50)  Ferritin, Serum: 823 ng/mL (12-10-20 @ 13:36)  Ferritin, Serum: 1050 ng/mL (12-05-20 @ 06:19)  Ferritin, Serum: 1275 ng/mL (12-03-20 @ 20:28)    C-Reactive Protein, Serum: 15.69 mg/dL (12-17-20 @ 07:32)  C-Reactive Protein, Serum: 5.50 mg/dL (12-15-20 @ 07:08)  C-Reactive Protein, Serum: 1.57 mg/dL (12-13-20 @ 09:50)  C-Reactive Protein, Serum: 1.24 mg/dL (12-10-20 @ 13:36)  C-Reactive Protein, Serum: 0.90 mg/dL (12-05-20 @ 06:19)      SARS-CoV-2: Detected (12-03-20 @ 20:35)  Rapid RVP Result: Detected (12-03-20 @ 20:35)    COVID-19 IgG Antibody Index: <0.10 Index (12-04-20 @ 13:11)  COVID-19 IgG Antibody Interpretation: Negative (12-04-20 @ 13:11)

## 2020-12-18 NOTE — PROGRESS NOTE ADULT - ASSESSMENT
This 69 y/o male with h/o cad s/p cabg , aicd, htn, dm, on eliquis now as per pt, not sure why he is on it , stated it is his heart related ( used to be on coumadin before ), reports no h/o blood clots, came to the ER brought in by his wife as he has not been feeling well for past 4 days, lack of appetite, generalized weakness and loose watery diarrhea, 3-4 time / day, no blood in stool reported, no abd. pain, no vomiting, some nausea. no sob, no cough, reports no sick contact, no travel, was not on any antibiotics recently, no cp, no dizziness. pt. reported that there has been no diarrhea in the ER for past several hours,  t max in the .7, got Tylenol and at the time of admission 97.9. no other complaints. pt's o2 sat was 90 % RA and 96 % with 2 L. pt. is COVID-19 positive .   I called pt's son Bill at [phone]  to discuss pt's status and meds but there was no answer. Will request day team to contact [facility] to inquire about pt's use of eliquis and indication so his record can be updated, pt. stated that he uses digoxin.  (04 Dec 2020 00:52)    patient confirmed with positive COVID test.   Xray of the chest with bilateral lower lobe infiltrates.     he denies sick contacts.       Impression:  COVID-19 infection   Viral pneumonia  shortness of breath  lung infiltrates  dependence on oxygen    Plan:  - completed Remdesivir IV daily.   -> thru 12/14/2020  x 10 days  - completed dexamethasone course    - SLOW clinical improvement  INFLAMM markers are still elevated, COUPLED with worsened CT chest   DOXY AND MERREM STARTED 12/17   repeat cultures from blood sent         - Continue supportive care measures  - continue Oxygenation as needed;  CONTINUE to titrate down as tolerated  - self- proning  as tolerated  - ENCOURAGED OOB to chair

## 2020-12-19 NOTE — PROGRESS NOTE ADULT - ATTENDING COMMENTS
Limited Hx available at this time. ECG and tele reviewed. Patient alternates between AFIb with RVR and sinus tachycardia, now in sinus tachycardia at 110 bpm  unclear if low EF, but the presence of an ICD is suggestive of low EF  Given metoprolol, then changed to diltiazem. Also given 3 doses of digoxin 0.125 with no load.    - Now in sinus, unknown LVEF  -Patient of [facility]     1. STOP all calcium channel blocker till EF is known  2. TTE  3. Use metoprolol if AFIB with RVR, and BP can tolerate it  4. If AFIB with RVR with soft BP overnight, given 0.5 mg of IV digoxin (patient has dual chamber ICD so protected from bradycardia)    - Consult [facility], we will not follow up unless needed      EPS will sign off for now, above d/w MICU team around 7 pm

## 2020-12-19 NOTE — PROGRESS NOTE ADULT - ASSESSMENT
70 M with a significant PMH of CAD s/p CABG, AICD, HTN, DM2, on apixaban (unclear why) who presented to the ER brought on 12/3 with malaise x 4 days, lack of appetite, generalized weakness and loose watery diarrhea (3-4 times/day) and admitted for COVID-19 PNA with progression to Acute Hypoxic Respiratory Failure requiring HFNC.  Cardiology consulted for Afib with RVR    #Tachycardia  ·	Unclear indication for outpatient apixaban and digoxin  ·	Upon review of EKGs, no afib.  Patient in Sinus tachycardia  ·	Tachycardia likely from profound hypoxia, agitation/delirium.  ·	Optimize medical management of COVID-19 PNA/ARDS  ·	Per son, patient seen at [facility]; would advise to obtain medical records  ·	If patient delirious and unable to cooperate with care, would change PO dig to IV  ·	Please await final recommendations by Dr. Franklin   70 M with a significant PMH of CAD s/p CABG, AICD, HTN, DM2, on apixaban (unclear why) who presented to the ER brought on 12/3 with malaise x 4 days, lack of appetite, generalized weakness and loose watery diarrhea (3-4 times/day) and admitted for COVID-19 PNA with progression to Acute Hypoxic Respiratory Failure requiring HFNC.  Cardiology consulted for Afib with RVR    #Tachycardia  ·	  ·	Per son, patient seen at [facility]; would advise to obtain medical records  ·	If patient delirious and unable to cooperate with care, would change PO dig to IV  ·	Please await final recommendations by Dr. Franklin

## 2020-12-19 NOTE — CONSULT NOTE ADULT - SUBJECTIVE AND OBJECTIVE BOX
Patient is a 70y old  Male who presents with a chief complaint of covid- 19 infection (19 Dec 2020 16:27)      BRIEF HOSPITAL COURSE: 70y Male  ***    Events last 24 hours: ***    PAST MEDICAL & SURGICAL HISTORY:  Diabetes    MI (myocardial infarction)    High cholesterol    Hypertension    AICD (automatic cardioverter/defibrillator) present    S/P CABG x 3          Hosp day #16d    Vent day #        Vital signs / Reviewed and Physical Exam Performed where pertinent and urgently required    Lab / Radiology  studies / ABG / Meds -  reviewed and interpreted into the assessment and treatment plan.      Assessment/Plan/Therapeutic interventions      Neuro - Sedation neuromuscular blockade to facilitate safe ventilation    CV -  Pressor support as needed to maintain MAP 65           Avoiding fluid challenges          QTC monitoring while on Azithromycin and Hydroxychloroquine.    Pulm -  ARDS-NET 4-6cc/kg IBW TV as able to maintain plateau pressures <30               Prone ventilation consideration as feasible  Pa02/Fi02 < 150 on Fi02 >60% and PEEP at least 5                 Vent bundle Reviewed     GI -  PPI  Enteric feeds as tolerated in tandem with NMB and prone ventilation    Renal - Even to negative fluid balance as tolerated by hemodynamics and renal fx.  Feeds to be provided in lieu of IVF.     Heme -  Pharmacologic DVT PPx  in addition to SCD's    ID - ABX discontinuation based on discussion with ID in conjunction with clinical features, culture data, and judicious procalcitonin monitoring.      Endo -  Aggressive glycemic control to limit FS glucose to < 180mg/dl.      COVID 19 specific considerations and therapeutic  options based on the available and rapidly changing literature    Goals of care considerations:  Ongoing assessment for patient specific treatment options based on progression or decline.  I have involved the family with updates and requests in guidance for medical decision making.          38  Minutes of critical care tiem spent in the management of this critically ill COVID-19 patient/PUI patient with continuous assessments and interventions based on the interpretation of multiple databases.   Patient is a 70y old  Male who presents with a chief complaint of covid- 19 infection (19 Dec 2020 16:27)      BRIEF HOSPITAL COURSE: 71 y/o male pmhx CAD, s/p CABG, s/p AICD, HTN, DM2 admitted on 12/3 w/ COVID-19 pneumonia. Hospital course complicated by acute hypoxic respiratory failure requiring HFNC,  superimposed bacterial pneumonia started on abx 12/17, afib w/ RVR.     Events last 24 hours: Patient was RRT today for worsening hypoxia, afib w/ RVR and agitation. MICU was consulted. Placed on BiPAP 16/8 100% FiO2, given 10mg Cardizem, gave dose of IV Dig ( missed PO dose today) and started on Precedex     PAST MEDICAL & SURGICAL HISTORY:  Diabetes    MI (myocardial infarction)    High cholesterol    Hypertension    AICD (automatic cardioverter/defibrillator) present    S/P CABG x 3          Hosp day #16d    Vent day # 0        Vital signs / Reviewed and Physical Exam Performed where pertinent and urgently required    Lab / Radiology  studies / ABG / Meds -  reviewed and interpreted into the assessment and treatment plan.      Assessment/Plan/Therapeutic interventions    Impression:  1. Acute hypoxic respiratory failure  2. ARDS  3. COVID-19 Viral pneumonia  4. Afib w/ RVR   5. Agitation/ Delirium   6. Superimposed bacterial pneumonia     Neuro - d/c haldol and ativan.  Start low dose precedex for agitation and BiPAP compliance. Avoid further benzos in elderly delirious patient     CV -  Monitor end points of perfusion.   -  Afib w/ RVR vs A flutter, gave 10mg IV Cardizem @ RRT, now in sinus tachycardia 120's. Changed PO dig to IV, give first dose now.  [place] cardiology was consulted today, patient is [facility] patient will consult [facility]. Telemetry Monitoring.   - Continue ASA and statin     Pulm -  Placed on BiPAP 16/8, 100% FiO2. Titrating FiO2 to maintain O2 sat >88%. Respiratory status extremely tenuous High risk for intubation             Finished course of Remdesivir and Decadron. Albuterol MDI     GI -  NPO while on BiPAP     Renal - Goal net negative fluid balance, negative for hospital stay so far.  Oliveros placed for I&OS.     Heme - FUll dose AC w/ Lovenox.     ID - Started on Meropenum and Doxy for possible superimposed bacterial pneumonia. Cultures w/ no growth to date. ID Following.     Endo -  Aggressive glycemic control to limit FS glucose to < 180mg/dl.      Called and left voicemail for son Bill to call back hospital for update.     Goals of care considerations:  Ongoing assessment for patient specific treatment options based on progression or decline.  I have involved the family with updates and requests in guidance for medical decision making.          45  Minutes of critical care tiem spent in the management of this critically ill COVID-19 patient/PUI patient with continuous assessments and interventions based on the interpretation of multiple databases.

## 2020-12-19 NOTE — PROGRESS NOTE ADULT - SUBJECTIVE AND OBJECTIVE BOX
HISTORY OF PRESENT ILLNESS:  70 M with a significant PMH of CAD s/p CABG, AICD, HTN, DM2, on apixaban (unclear why) who presented to the ER brought on 12/3 with malaise x 4 days, lack of appetite, generalized weakness and loose watery diarrhea (3-4 times/day).  He denied blood in stool, abd pain, vomiting, SOB, cough, sick contacts or recent travel).  He was subsequently found to be COVID + and admitted for treatment.  He became progressively hypoxic requiring HFNC and is now undergoing treatment of Acute hypoxic respiratory failure 2/2 COVID-19 PNA and is s/p remdesivir.     Cardiology consulted for afib with RVR.          Allergies    No Known Allergies    Intolerances    	    MEDICATIONS:  aspirin enteric coated 81 milliGRAM(s) Oral daily  digoxin     Tablet 0.125 milliGRAM(s) Oral daily  diltiazem Injectable 10 milliGRAM(s) IV Push every 8 hours PRN  enoxaparin Injectable 75 milliGRAM(s) SubCutaneous every 12 hours  metoprolol succinate ER 25 milliGRAM(s) Oral daily  metoprolol tartrate Injectable 5 milliGRAM(s) IV Push every 12 hours    doxycycline IVPB      doxycycline IVPB 100 milliGRAM(s) IV Intermittent every 12 hours  meropenem  IVPB 1000 milliGRAM(s) IV Intermittent every 8 hours  meropenem  IVPB        ALBUTerol    90 MICROgram(s) HFA Inhaler 2 Puff(s) Inhalation every 4 hours PRN  ALBUTerol    90 MICROgram(s) HFA Inhaler 2 Puff(s) Inhalation every 6 hours  guaiFENesin  milliGRAM(s) Oral every 12 hours    acetaminophen   Tablet .. 650 milliGRAM(s) Oral every 6 hours PRN  acetaminophen   Tablet .. 650 milliGRAM(s) Oral every 6 hours PRN  haloperidol    Injectable 2.5 milliGRAM(s) IntraMuscular every 6 hours PRN  LORazepam   Injectable 0.5 milliGRAM(s) IV Push three times a day PRN  ondansetron Injectable 4 milliGRAM(s) IV Push every 6 hours PRN      atorvastatin 40 milliGRAM(s) Oral at bedtime  dextrose 40% Gel 15 Gram(s) Oral once  dextrose 50% Injectable 25 Gram(s) IV Push once  dextrose 50% Injectable 12.5 Gram(s) IV Push once  dextrose 50% Injectable 25 Gram(s) IV Push once  glucagon  Injectable 1 milliGRAM(s) IntraMuscular once  insulin lispro (ADMELOG) corrective regimen sliding scale   SubCutaneous three times a day before meals  insulin lispro (ADMELOG) corrective regimen sliding scale   SubCutaneous at bedtime    ascorbic acid 500 milliGRAM(s) Oral daily  cholecalciferol 1000 Unit(s) Oral daily  dextrose 5%. 1000 milliLiter(s) IV Continuous <Continuous>  dextrose 5%. 1000 milliLiter(s) IV Continuous <Continuous>  multivitamin/minerals 1 Tablet(s) Oral daily      PAST MEDICAL & SURGICAL HISTORY:  Diabetes    MI (myocardial infarction)    High cholesterol    Hypertension    AICD (automatic cardioverter/defibrillator) present    S/P CABG x 3        FAMILY HISTORY:  No pertinent family history in first degree relatives        SOCIAL HISTORY:    [ ] Non-smoker  [ ] Smoker  [ ] Alcohol    REVIEW OF SYSTEMS:  See HPI, otherwise complete 10 point review of systems negative    PHYSICAL EXAM:  T(C): 36.8 (12-19-20 @ 15:47), Max: 37.4 (12-19-20 @ 05:40)  HR: 108 (12-19-20 @ 15:47) (90 - 155)  BP: 100/74 (12-19-20 @ 15:47) (90/52 - 147/83)  RR: 30 (12-19-20 @ 15:47) (25 - 31)  SpO2: 92% (12-19-20 @ 15:47) (91% - 98%)  Wt(kg): --  I&O's Summary    18 Dec 2020 07:01  -  19 Dec 2020 07:00  --------------------------------------------------------  IN: 0 mL / OUT: 525 mL / NET: -525 mL        Appearance: No Acute Distress	  HEENT:  Normal oral mucosa, PERRL, EOMI	  Cardiovascular: Normal S1 S2, No JVD, No murmurs/rubs/gallops  Respiratory: Lungs clear to auscultation bilaterally  Gastrointestinal:  Soft, Non-tender, + BS	  Skin: No rashes, No ecchymoses, No cyanosis	  Neurologic: Non-focal  Extremities: No clubbing, cyanosis or edema  Vascular: Peripheral pulses palpable 2+ bilaterally  Psychiatry: A & O x 3, Mood & affect appropriate    LABS:	 	    CBC Full  -  ( 19 Dec 2020 06:03 )  WBC Count : 20.80 K/uL  Hemoglobin : 14.6 g/dL  Hematocrit : 44.3 %  Platelet Count - Automated : 124 K/uL  Mean Cell Volume : 97.8 fl  Mean Cell Hemoglobin : 32.2 pg  Mean Cell Hemoglobin Concentration : 33.0 gm/dL  Auto Neutrophil # : 18.31 K/uL  Auto Lymphocyte # : 0.96 K/uL  Auto Monocyte # : 1.11 K/uL  Auto Eosinophil # : 0.04 K/uL  Auto Basophil # : 0.04 K/uL  Auto Neutrophil % : 88.1 %  Auto Lymphocyte % : 4.6 %  Auto Monocyte % : 5.3 %  Auto Eosinophil % : 0.2 %  Auto Basophil % : 0.2 %    12-19    139  |  104  |  38.0<H>  ----------------------------<  128<H>  5.1   |  25.0  |  0.76  12-18    135  |  103  |  32.0<H>  ----------------------------<  132<H>  4.6   |  24.0  |  0.48<L>    Ca    8.4<L>      19 Dec 2020 05:58  Ca    8.1<L>      18 Dec 2020 07:11  Mg     2.2     12-19  Mg     2.0     12-18    TPro  5.9<L>  /  Alb  2.0<L>  /  TBili  1.3  /  DBili  x   /  AST  20  /  ALT  16  /  AlkPhos  93  12-19  TPro  5.9<L>  /  Alb  2.1<L>  /  TBili  1.3  /  DBili  0.4<H>  /  AST  21  /  ALT  17  /  AlkPhos  91  12-19      proBNP:   Lipid Profile:   HgA1c:   TSH:       CARDIAC MARKERS:            TELEMETRY: Sinus tachycardia 	    ECG:  	  < from: 12 Lead ECG (12.19.20 @ 13:53) >  Ventricular Rate 100 BPM  Atrial Rate 100 BPM  P-R Interval 170 ms  QRS Duration 102 ms  Q-T Interval 374 ms  QTC Calculation(Bazett) 482 ms  P Axis 64 degrees  R Axis -15 degrees  T Axis 174 degrees  Diagnosis Line Sinus rhythm with Premature atrial complexes and demand atrial pacing  Septal infarct , age undetermined  ST & T wave abnormality, consider inferolateral ischemia  Abnormal ECG      RADIOLOGY:  < from: Xray Chest 1 View-PORTABLE IMMEDIATE (Xray Chest 1 View-PORTABLE IMMEDIATE .) (12.15.20 @ 07:38) >  EXAM:  XR CHEST PORTABLE IMMED 1V                        PROCEDURE DATE:  12/15/2020    INTERPRETATION:  AP chest on December 15, 2020 at 7:01 AM. Patient is short of breath.  Heart is magnified by technique. Sternotomy and left-sided defibrillator again noted.  Mild infiltrate at left base is similar to December 3.  Present film also shows a small right mid lateral lung field infiltrate as well.  IMPRESSION: Stable mild left base infiltrate.  There is a new right mid lateral infiltrate mild in degree.    CT Chest  < from: CT Chest No Cont (12.16.20 @ 22:18) >  IMPRESSION:  Exam is limited secondary to motion artifact.  Scattered bilateral groundglass opacities, predominantly peripheral in distribution. Findings are nonspecific, likely in keeping with atypical viral multifocal pneumonia including reported COVID-19.  Additional consolidative opacities identified within bilateral lower lobes, right greater left. Recommend clinical correlation to assess for superimposed bacterial infection and short-term imaging in 6 weeks is advised following treatment to demonstrate resolution.  Additional findings as mentioned above.

## 2020-12-19 NOTE — RAPID RESPONSE TEAM SUMMARY - NSOTHERINTERVENTIONSRRT_GEN_ALL_CORE
-BiPAP orders placed at IPAP 12, EPAP 6 on 100% Fio2 at 60LPM.   -MICU consult called, patient upgraded to ICU level of care  -jesus placed   -Zoll pads placed

## 2020-12-19 NOTE — PROGRESS NOTE ADULT - SUBJECTIVE AND OBJECTIVE BOX
Patient is a 70y old  Male who presents with a chief complaint of covid- 19 infection (18 Dec 2020 17:38)      HPI:  71 y/o male with h/o cad s/p cabg , aicd, htn, dm, on eliquis now as per pt, not sure why he is on it , stated it is his heart related ( used to be on coumadin before ), reports no h/o blood clots, came to the ER brought in by his wife as he has not been feeling well for past 4 days, lack of appetite, generalized weakness and loose watery diarrhea, 3-4 time / day, no blood in stool reported, no abd. pain, no vomiting, some nausea. no sob, no cough, reports no sick contact, no travel, was not on any antibiotics recently, no cp, no dizziness. pt. reported that there has been no diarrhea in the ER for past several hours,  t max in the .7, got Tylenol and at the time of admission 97.9. no other complaints. pt's o2 sat was 90 % RA and 96 % with 2 L. pt. is covdi-19 positive .   I called pt's son Bill at [phone]  to discuss pt's status and meds but there was no answer. Will request day team to contact [place] cardiology office to inquire about pt's use of eliquis and indication so his record can be updated, pt. stated that he uses digoxin.  (04 Dec 2020 00:52)    FAMILY HISTORY:  No pertinent family history in first degree relatives    INTERVAL HPI/OVERNIGHT EVENTS:  Pt. is seen and examined. Case D/w attending  Pt. still requires HFNC O2 sat 93%  Tachycardia possibly due to limited PO intake and aspiration precautions- Lopressor IV Stat  Lopressor converted to IV standing,   c/w Cardizem IV PRN  Will need to repeat swallow eval when stable  labs: D-dimer, pt/ptt/inr, TROP, cpk  BLE dopplers  Full dose Lovenox        PAST MEDICAL & SURGICAL HISTORY:  Diabetes  MI (myocardial infarction)  High cholesterol  Hypertension  AICD (automatic cardioverter/defibrillator) present  S/P CABG x 3      Allergies  No Known Allergies  Intolerances    Vital Signs Last 24 Hrs  T(C): 37.4 (19 Dec 2020 05:40), Max: 37.4 (19 Dec 2020 05:40)  T(F): 99.3 (19 Dec 2020 05:40), Max: 99.3 (19 Dec 2020 05:40)  HR: 90 (19 Dec 2020 13:05) (90 - 155)  BP: 104/60 (19 Dec 2020 13:05) (90/52 - 147/83)  RR: 31 (19 Dec 2020 13:05) (25 - 31)  SpO2: 94% (19 Dec 2020 13:05) (89% - 98%)                         14.6   20.80 )-----------( 124      ( 19 Dec 2020 06:03 )             44.3     LIVER FUNCTIONS - ( 19 Dec 2020 05:58 )  Alb: 2.0 g/dL / Pro: 5.9 g/dL / ALK PHOS: 93 U/L / ALT: 16 U/L / AST: 20 U/L / GGT: x           RADIOLOGY & ADDITIONAL STUDIES:    MEDICATIONS:  acetaminophen   Tablet .. 650 milliGRAM(s) Oral every 6 hours PRN  acetaminophen   Tablet .. 650 milliGRAM(s) Oral every 6 hours PRN  ALBUTerol    90 MICROgram(s) HFA Inhaler 2 Puff(s) Inhalation every 6 hours  ALBUTerol    90 MICROgram(s) HFA Inhaler 2 Puff(s) Inhalation every 4 hours PRN  ascorbic acid 500 milliGRAM(s) Oral daily  aspirin enteric coated 81 milliGRAM(s) Oral daily  atorvastatin 40 milliGRAM(s) Oral at bedtime  cholecalciferol 1000 Unit(s) Oral daily  dextrose 40% Gel 15 Gram(s) Oral once  dextrose 5%. 1000 milliLiter(s) IV Continuous <Continuous>  dextrose 5%. 1000 milliLiter(s) IV Continuous <Continuous>  dextrose 50% Injectable 25 Gram(s) IV Push once  dextrose 50% Injectable 12.5 Gram(s) IV Push once  dextrose 50% Injectable 25 Gram(s) IV Push once  digoxin     Tablet 0.125 milliGRAM(s) Oral daily  diltiazem Injectable 10 milliGRAM(s) IV Push every 8 hours PRN  doxycycline IVPB      doxycycline IVPB 100 milliGRAM(s) IV Intermittent every 12 hours  enoxaparin Injectable 75 milliGRAM(s) SubCutaneous every 12 hours  glucagon  Injectable 1 milliGRAM(s) IntraMuscular once  guaiFENesin  milliGRAM(s) Oral every 12 hours  haloperidol    Injectable 2.5 milliGRAM(s) IntraMuscular every 6 hours PRN  insulin lispro (ADMELOG) corrective regimen sliding scale   SubCutaneous three times a day before meals  insulin lispro (ADMELOG) corrective regimen sliding scale   SubCutaneous at bedtime  LORazepam   Injectable 0.5 milliGRAM(s) IV Push three times a day PRN  meropenem  IVPB      meropenem  IVPB 1000 milliGRAM(s) IV Intermittent every 8 hours  metoprolol succinate ER 25 milliGRAM(s) Oral daily  metoprolol tartrate Injectable 5 milliGRAM(s) IV Push every 12 hours  multivitamin/minerals 1 Tablet(s) Oral daily  ondansetron Injectable 4 milliGRAM(s) IV Push every 6 hours PRN     Patient is a 70y old  Male who presents with a chief complaint of covid- 19 infection (18 Dec 2020 17:38)      HPI:  69 y/o male with h/o cad s/p cabg , aicd, htn, dm, on eliquis now as per pt, not sure why he is on it , stated it is his heart related ( used to be on coumadin before ), reports no h/o blood clots, came to the ER brought in by his wife as he has not been feeling well for past 4 days, lack of appetite, generalized weakness and loose watery diarrhea, 3-4 time / day, no blood in stool reported, no abd. pain, no vomiting, some nausea. no sob, no cough, reports no sick contact, no travel, was not on any antibiotics recently, no cp, no dizziness. pt. reported that there has been no diarrhea in the ER for past several hours,  t max in the .7, got Tylenol and at the time of admission 97.9. no other complaints. pt's o2 sat was 90 % RA and 96 % with 2 L. pt. is covdi-19 positive .   I called pt's son Bill at [phone]  to discuss pt's status and meds but there was no answer. Will request day team to contact [place] cardiology office to inquire about pt's use of eliquis and indication so his record can be updated, pt. stated that he uses digoxin.  (04 Dec 2020 00:52)    FAMILY HISTORY:  No pertinent family history in first degree relatives    INTERVAL HPI/OVERNIGHT EVENTS:  Pt. is seen and examined. Case D/w attending  Pt. still requires HFNC O2 sat 93%, keeps on ripping his HFNC- and desats quickly might need restraints  Ativan/Haldol PRN   Tachycardia possibly due to limited PO intake and aspiration precautions- Lopressor IV Stat  Lopressor converted to IV standing,   c/w Cardizem IV PRN  Will need to repeat swallow eval when stable  labs: D-dimer, pt/ptt/inr, TROP, cpk  BLE dopplers  Full dose Lovenox  Will Obtain Cardiology Consult for firther recommendations soft BP & paroxysmal AFIB        PAST MEDICAL & SURGICAL HISTORY:  Diabetes  MI (myocardial infarction)  High cholesterol  Hypertension  AICD (automatic cardioverter/defibrillator) present  S/P CABG x 3      Allergies  No Known Allergies  Intolerances    Vital Signs Last 24 Hrs  T(C): 37.4 (19 Dec 2020 05:40), Max: 37.4 (19 Dec 2020 05:40)  T(F): 99.3 (19 Dec 2020 05:40), Max: 99.3 (19 Dec 2020 05:40)  HR: 90 (19 Dec 2020 13:05) (90 - 155)  BP: 104/60 (19 Dec 2020 13:05) (90/52 - 147/83)  RR: 31 (19 Dec 2020 13:05) (25 - 31)  SpO2: 94% (19 Dec 2020 13:05) (89% - 98%)                         14.6   20.80 )-----------( 124      ( 19 Dec 2020 06:03 )             44.3     LIVER FUNCTIONS - ( 19 Dec 2020 05:58 )  Alb: 2.0 g/dL / Pro: 5.9 g/dL / ALK PHOS: 93 U/L / ALT: 16 U/L / AST: 20 U/L / GGT: x           RADIOLOGY & ADDITIONAL STUDIES:    MEDICATIONS:  acetaminophen   Tablet .. 650 milliGRAM(s) Oral every 6 hours PRN  acetaminophen   Tablet .. 650 milliGRAM(s) Oral every 6 hours PRN  ALBUTerol    90 MICROgram(s) HFA Inhaler 2 Puff(s) Inhalation every 6 hours  ALBUTerol    90 MICROgram(s) HFA Inhaler 2 Puff(s) Inhalation every 4 hours PRN  ascorbic acid 500 milliGRAM(s) Oral daily  aspirin enteric coated 81 milliGRAM(s) Oral daily  atorvastatin 40 milliGRAM(s) Oral at bedtime  cholecalciferol 1000 Unit(s) Oral daily  dextrose 40% Gel 15 Gram(s) Oral once  dextrose 5%. 1000 milliLiter(s) IV Continuous <Continuous>  dextrose 5%. 1000 milliLiter(s) IV Continuous <Continuous>  dextrose 50% Injectable 25 Gram(s) IV Push once  dextrose 50% Injectable 12.5 Gram(s) IV Push once  dextrose 50% Injectable 25 Gram(s) IV Push once  digoxin     Tablet 0.125 milliGRAM(s) Oral daily  diltiazem Injectable 10 milliGRAM(s) IV Push every 8 hours PRN  doxycycline IVPB      doxycycline IVPB 100 milliGRAM(s) IV Intermittent every 12 hours  enoxaparin Injectable 75 milliGRAM(s) SubCutaneous every 12 hours  glucagon  Injectable 1 milliGRAM(s) IntraMuscular once  guaiFENesin  milliGRAM(s) Oral every 12 hours  haloperidol    Injectable 2.5 milliGRAM(s) IntraMuscular every 6 hours PRN  insulin lispro (ADMELOG) corrective regimen sliding scale   SubCutaneous three times a day before meals  insulin lispro (ADMELOG) corrective regimen sliding scale   SubCutaneous at bedtime  LORazepam   Injectable 0.5 milliGRAM(s) IV Push three times a day PRN  meropenem  IVPB      meropenem  IVPB 1000 milliGRAM(s) IV Intermittent every 8 hours  metoprolol succinate ER 25 milliGRAM(s) Oral daily  metoprolol tartrate Injectable 5 milliGRAM(s) IV Push every 12 hours  multivitamin/minerals 1 Tablet(s) Oral daily  ondansetron Injectable 4 milliGRAM(s) IV Push every 6 hours PRN

## 2020-12-19 NOTE — CHART NOTE - NSCHARTNOTEFT_GEN_A_CORE
Source: Patient [ ]  Family [ ]   other [ x]    Current Diet: Diet, DASH/TLC:   Sodium & Cholesterol Restricted  Consistent Carbohydrate {Evening Snack} (CSTCHOSN)  Supplement Feeding Modality:  Oral  Glucerna Shake Cans or Servings Per Day:  1       Frequency:  Three Times a day (12-14-20 @ 11:24)      Patient reports [ ] nausea  [ ] vomiting [ ] diarrhea [ ] constipation  [ ]chewing problems [ ] swallowing issues  [ ] other:     PO intake:  < 50% [x ]   50-75%  [ ]   %  [ ]  other :    Source for PO intake [ ] Patient [ ] family [x ] chart [ ] staff [ ] other    Current Weight:   (12/3) 162 lbs  No edema    Pertinent Medications: MEDICATIONS  (STANDING):  ALBUTerol    90 MICROgram(s) HFA Inhaler 2 Puff(s) Inhalation every 6 hours  apixaban 5 milliGRAM(s) Oral every 12 hours  ascorbic acid 500 milliGRAM(s) Oral daily  aspirin enteric coated 81 milliGRAM(s) Oral daily  atorvastatin 40 milliGRAM(s) Oral at bedtime  cholecalciferol 1000 Unit(s) Oral daily  dextrose 40% Gel 15 Gram(s) Oral once  dextrose 5%. 1000 milliLiter(s) (50 mL/Hr) IV Continuous <Continuous>  dextrose 5%. 1000 milliLiter(s) (100 mL/Hr) IV Continuous <Continuous>  dextrose 50% Injectable 25 Gram(s) IV Push once  dextrose 50% Injectable 12.5 Gram(s) IV Push once  dextrose 50% Injectable 25 Gram(s) IV Push once  digoxin     Tablet 0.125 milliGRAM(s) Oral daily  doxycycline IVPB      doxycycline IVPB 100 milliGRAM(s) IV Intermittent every 12 hours  glucagon  Injectable 1 milliGRAM(s) IntraMuscular once  guaiFENesin  milliGRAM(s) Oral every 12 hours  insulin lispro (ADMELOG) corrective regimen sliding scale   SubCutaneous three times a day before meals  insulin lispro (ADMELOG) corrective regimen sliding scale   SubCutaneous at bedtime  meropenem  IVPB 1000 milliGRAM(s) IV Intermittent every 8 hours  meropenem  IVPB      metoprolol succinate ER 25 milliGRAM(s) Oral daily  multivitamin/minerals 1 Tablet(s) Oral daily    MEDICATIONS  (PRN):  acetaminophen   Tablet .. 650 milliGRAM(s) Oral every 6 hours PRN Temp greater or equal to 38C (100.4F)  acetaminophen   Tablet .. 650 milliGRAM(s) Oral every 6 hours PRN Mild Pain (1 - 3)  ALBUTerol    90 MICROgram(s) HFA Inhaler 2 Puff(s) Inhalation every 4 hours PRN Shortness of Breath and/or Wheezing  diltiazem Injectable 10 milliGRAM(s) IV Push every 8 hours PRN for HR>110  haloperidol    Injectable 2.5 milliGRAM(s) IntraMuscular every 6 hours PRN agitation  LORazepam   Injectable 0.5 milliGRAM(s) IV Push three times a day PRN Anxiety  ondansetron Injectable 4 milliGRAM(s) IV Push every 6 hours PRN Nausea and/or Vomiting    Pertinent Labs: CBC Full  -  ( 19 Dec 2020 06:03 )  WBC Count : 20.80 K/uL  RBC Count : 4.53 M/uL  Hemoglobin : 14.6 g/dL  Hematocrit : 44.3 %  Platelet Count - Automated : 124 K/uL  Mean Cell Volume : 97.8 fl  Mean Cell Hemoglobin : 32.2 pg  Mean Cell Hemoglobin Concentration : 33.0 gm/dL  Auto Neutrophil # : 18.31 K/uL  Auto Lymphocyte # : 0.96 K/uL  Auto Monocyte # : 1.11 K/uL  Auto Eosinophil # : 0.04 K/uL  Auto Basophil # : 0.04 K/uL  Auto Neutrophil % : 88.1 %  Auto Lymphocyte % : 4.6 %  Auto Monocyte % : 5.3 %  Auto Eosinophil % : 0.2 %  Auto Basophil % : 0.2 %    12-19 Na139 mmol/L Glu 128 mg/dL<H> K+ 5.1 mmol/L Cr  0.76 mg/dL BUN 38.0 mg/dL<H> Phos n/a   Alb 2.0 g/dL<L> PAB n/a       Skin: Intact per documentation    Nutrition focused physical exam not conducted at this time due to COVID isolation precautions- found signs of malnutrition [ ]absent [ ]present    Subcutaneous fat loss: [ ] Orbital fat pads region, [ ]Buccal fat region, [ ]Triceps region,  [ ]Ribs region    Muscle wasting: [ ]Temples region, [ ]Clavicle region, [ ]Shoulder region, [ ]Scapula region, [ ]Interosseous region,  [ ]thigh region, [ ]Calf region    Estimated Needs:   [x ] no change since previous assessment  [ ] recalculated:     Current Nutrition Diagnosis: Pt remains at nutrition risk secondary to increased nutrient needs related to increased physiological demand for nutrient as evidenced by acute hypoxic respiratory failure due to COVID. Pt on HFNC and NRB. Continues to receive CCHO, DASH/TLC diet. Glucerna TID now added. Pt with poor po intake, noted doesn't feel well enough to eat.     Recommendations:   1) Continue diet as tolerated.  2) Continue Glucerna TID to optimize po intake and provide an additional 220 kcal, 10g protein per secg.  3) Continue MVI, vitamin C, vitamin D supplementation.  4) Encourage po intake, monitor diet tolerance, and provide assistance at meals as needed.   5) Obtain daily weights to monitor trends.     Monitoring and Evaluation:   [x ] PO intake [ x] Tolerance to diet prescription [X] Weights  [X] Follow up per protocol [X] Labs Source: Patient [ ]  Family [ ]   other [ x]    Current Diet: Diet, DASH/TLC:   Sodium & Cholesterol Restricted  Consistent Carbohydrate {Evening Snack} (CSTCHOSN)  Supplement Feeding Modality:  Oral  Glucerna Shake Cans or Servings Per Day:  1       Frequency:  Three Times a day (12-14-20 @ 11:24)      Patient reports [ ] nausea  [ ] vomiting [ ] diarrhea [ ] constipation  [ ]chewing problems [ ] swallowing issues  [ ] other:     PO intake:  < 50% [x ]   50-75%  [ ]   %  [ ]  other :    Source for PO intake [ ] Patient [ ] family [x ] chart [ ] staff [ ] other    Current Weight:   (12/3) 162 lbs  No edema    Pertinent Medications: MEDICATIONS  (STANDING):  ALBUTerol    90 MICROgram(s) HFA Inhaler 2 Puff(s) Inhalation every 6 hours  apixaban 5 milliGRAM(s) Oral every 12 hours  ascorbic acid 500 milliGRAM(s) Oral daily  aspirin enteric coated 81 milliGRAM(s) Oral daily  atorvastatin 40 milliGRAM(s) Oral at bedtime  cholecalciferol 1000 Unit(s) Oral daily  dextrose 40% Gel 15 Gram(s) Oral once  dextrose 5%. 1000 milliLiter(s) (50 mL/Hr) IV Continuous <Continuous>  dextrose 5%. 1000 milliLiter(s) (100 mL/Hr) IV Continuous <Continuous>  dextrose 50% Injectable 25 Gram(s) IV Push once  dextrose 50% Injectable 12.5 Gram(s) IV Push once  dextrose 50% Injectable 25 Gram(s) IV Push once  digoxin     Tablet 0.125 milliGRAM(s) Oral daily  doxycycline IVPB      doxycycline IVPB 100 milliGRAM(s) IV Intermittent every 12 hours  glucagon  Injectable 1 milliGRAM(s) IntraMuscular once  guaiFENesin  milliGRAM(s) Oral every 12 hours  insulin lispro (ADMELOG) corrective regimen sliding scale   SubCutaneous three times a day before meals  insulin lispro (ADMELOG) corrective regimen sliding scale   SubCutaneous at bedtime  meropenem  IVPB 1000 milliGRAM(s) IV Intermittent every 8 hours  meropenem  IVPB      metoprolol succinate ER 25 milliGRAM(s) Oral daily  multivitamin/minerals 1 Tablet(s) Oral daily    MEDICATIONS  (PRN):  acetaminophen   Tablet .. 650 milliGRAM(s) Oral every 6 hours PRN Temp greater or equal to 38C (100.4F)  acetaminophen   Tablet .. 650 milliGRAM(s) Oral every 6 hours PRN Mild Pain (1 - 3)  ALBUTerol    90 MICROgram(s) HFA Inhaler 2 Puff(s) Inhalation every 4 hours PRN Shortness of Breath and/or Wheezing  diltiazem Injectable 10 milliGRAM(s) IV Push every 8 hours PRN for HR>110  haloperidol    Injectable 2.5 milliGRAM(s) IntraMuscular every 6 hours PRN agitation  LORazepam   Injectable 0.5 milliGRAM(s) IV Push three times a day PRN Anxiety  ondansetron Injectable 4 milliGRAM(s) IV Push every 6 hours PRN Nausea and/or Vomiting    Pertinent Labs: CBC Full  -  ( 19 Dec 2020 06:03 )  WBC Count : 20.80 K/uL  RBC Count : 4.53 M/uL  Hemoglobin : 14.6 g/dL  Hematocrit : 44.3 %  Platelet Count - Automated : 124 K/uL  Mean Cell Volume : 97.8 fl  Mean Cell Hemoglobin : 32.2 pg  Mean Cell Hemoglobin Concentration : 33.0 gm/dL  Auto Neutrophil # : 18.31 K/uL  Auto Lymphocyte # : 0.96 K/uL  Auto Monocyte # : 1.11 K/uL  Auto Eosinophil # : 0.04 K/uL  Auto Basophil # : 0.04 K/uL  Auto Neutrophil % : 88.1 %  Auto Lymphocyte % : 4.6 %  Auto Monocyte % : 5.3 %  Auto Eosinophil % : 0.2 %  Auto Basophil % : 0.2 %    12-19 Na139 mmol/L Glu 128 mg/dL<H> K+ 5.1 mmol/L Cr  0.76 mg/dL BUN 38.0 mg/dL<H> Phos n/a   Alb 2.0 g/dL<L> PAB n/a       Skin: Intact per documentation    Nutrition focused physical exam not conducted at this time due to COVID isolation precautions- found signs of malnutrition [ ]absent [ ]present    Subcutaneous fat loss: [ ] Orbital fat pads region, [ ]Buccal fat region, [ ]Triceps region,  [ ]Ribs region    Muscle wasting: [ ]Temples region, [ ]Clavicle region, [ ]Shoulder region, [ ]Scapula region, [ ]Interosseous region,  [ ]thigh region, [ ]Calf region    Estimated Needs:   [x ] no change since previous assessment  [ ] recalculated:     Current Nutrition Diagnosis: Pt remains at nutrition risk secondary to increased nutrient needs related to increased physiological demand for nutrient as evidenced by acute hypoxic respiratory failure due to COVID. Pt on HFNC and NRB. Continues to receive CCHO, DASH/TLC diet. Glucerna TID now added. Pt with poor po intake, noted doesn't feel well enough to eat.     Recommendations:   1) Continue diet as tolerated.  2) Continue Glucerna TID to optimize po intake and provide an additional 220 kcal, 10g protein per serving.  3) Continue MVI, vitamin C, vitamin D supplementation.  4) Encourage po intake, monitor diet tolerance, and provide assistance at meals as needed.   5) Obtain daily weights to monitor trends.     Monitoring and Evaluation:   [x ] PO intake [ x] Tolerance to diet prescription [X] Weights  [X] Follow up per protocol [X] Labs

## 2020-12-19 NOTE — PROGRESS NOTE ADULT - ASSESSMENT
This 69 y/o male with h/o cad s/p cabg , aicd, htn, dm, on eliquis now as per pt, not sure why he is on it , stated it is his heart related ( used to be on coumadin before ), reports no h/o blood clots, came to the ER brought in by his wife as he has not been feeling well for past 4 days, lack of appetite, generalized weakness and loose watery diarrhea, 3-4 time / day, no blood in stool reported, no abd. pain, no vomiting, some nausea. no sob, no cough, reports no sick contact, no travel, was not on any antibiotics recently, no cp, no dizziness. pt. reported that there has been no diarrhea in the ER for past several hours,  t max in the .7, got Tylenol and at the time of admission 97.9. no other complaints. pt's o2 sat was 90 % RA and 96 % with 2 L. pt. is COVID-19 positive .   I called pt's son Bill at [phone]  to discuss pt's status and meds but there was no answer. Will request day team to contact [facility] to inquire about pt's use of eliquis and indication so his record can be updated, pt. stated that he uses digoxin.  (04 Dec 2020 00:52)    patient confirmed with positive COVID test.   Xray of the chest with bilateral lower lobe infiltrates.     he denies sick contacts.       Impression:  COVID-19 infection   Viral pneumonia  shortness of breath  lung infiltrates  dependence on oxygen    Plan:    - completed Remdesivir IV daily. on  12/14/2020  x 10 days  - completed dexamethasone course    - SLOW clinical improvement  INFLAMM markers are still elevated, COUPLED with worsened CT chest   DOXY AND MERREM STARTED 12/17   repeat cultures from blood sent         - Continue supportive care measures  - continue Oxygenation as needed;  CONTINUE to titrate down as tolerated  - self- proning  as tolerated  - ENCOURAGED OOB to chair        NEED To speak to family regarding goals of care  poor prognosis  slow clinical progression

## 2020-12-19 NOTE — PROGRESS NOTE ADULT - ATTENDING COMMENTS
I have personally seen, examined and participated in the care of this patient. I have reviewed all pertinent clinical information, including history, physical exam, plan and agree with the above.  pt taking off O2, intermittently agitated. tachycardic. transfer to stepdown I have personally seen, examined and participated in the care of this patient. I have reviewed all pertinent clinical information, including history, physical exam, plan and agree with the above.  pt taking off O2, intermittently agitated. tachycardic. f/u abg  transfer to stepdown    wife, charly, updated [phone]

## 2020-12-19 NOTE — PROGRESS NOTE ADULT - SUBJECTIVE AND OBJECTIVE BOX
[facility]  INFECTIOUS DISEASES AND INTERNAL MEDICINE at [place]  =======================================================  Eliezer Greenfield MD  Diplomates American Board of Internal Medicine and Infectious Diseases  Tel  [phone]  Fax [phone]  =======================================================    [id number]  YULI HEADLEY   follow up: COVID-19    requiring HF and NRB today      =======================================================    REVIEW OF SYSTEMS:  CONSTITUTIONAL:  No Fever or chills.  POOR appetite  HEENT:  No diplopia or blurred vision.  No earache, sore throat or runny nose.  CARDIOVASCULAR:  No pressure, squeezing, strangling, tightness, heaviness or aching about the chest, neck, axilla or epigastrium.  RESPIRATORY:  POSITIVE cough, shortness of breath  GASTROINTESTINAL:  No nausea, vomiting or diarrhea.  GENITOURINARY:  No dysuria, frequency or urgency. No Blood in urine  MUSCULOSKELETAL:  no joint aches, no muscle pain  SKIN:  No change in skin, hair or nails.  NEUROLOGIC:  No Headaches, seizures or weakness.  PSYCHIATRIC:  No disorder of thought or mood.  ENDOCRINE:  No heat or cold intolerance  HEMATOLOGICAL:  No easy bruising or bleeding.    =======================================================    Allergies  No Known Allergies    ======================================================  Physical Exam:  ============    General:  No acute distress.  Eye: Pupils are equal, round and reactive to light, Extraocular movements are intact, Normal conjunctiva.  HENT: Normocephalic, Oral mucosa is DRY, No pharyngeal erythema, No sinus tenderness.  Neck: Supple, No lymphadenopathy.  Respiratory: Lungs with COARSE breath sounds bilateral   Cardiovascular: Normal rate, Regular rhythm,   Gastrointestinal: Soft, Non-tender, Non-distended, Normal bowel sounds.  Genitourinary: No costovertebral angle tenderness.  Lymphatics: No lymphadenopathy neck,   Musculoskeletal: Normal range of motion, Normal strength.  Integumentary: No rash.  Neurologic: Alert, Oriented, No focal deficits, Cranial Nerves II-XII are grossly intact.  Psychiatric: Appropriate mood & affect.    =======================================================   Vitals:  ============  T(F): 99.3 (19 Dec 2020 05:40), Max: 99.3 (19 Dec 2020 05:40)  HR: 90 (19 Dec 2020 13:05)  BP: 104/60 (19 Dec 2020 13:05)  RR: 31 (19 Dec 2020 13:05)  SpO2: 94% (19 Dec 2020 13:05) (91% - 98%)  temp max in last 48H T(F): , Max: 99.3 (12-19-20 @ 05:40)    =======================================================  Current Antibiotics:  doxycycline IVPB      doxycycline IVPB 100 milliGRAM(s) IV Intermittent every 12 hours  meropenem  IVPB 1000 milliGRAM(s) IV Intermittent every 8 hours  meropenem  IVPB        Other medications:  ALBUTerol    90 MICROgram(s) HFA Inhaler 2 Puff(s) Inhalation every 6 hours  ascorbic acid 500 milliGRAM(s) Oral daily  aspirin enteric coated 81 milliGRAM(s) Oral daily  atorvastatin 40 milliGRAM(s) Oral at bedtime  cholecalciferol 1000 Unit(s) Oral daily  dextrose 40% Gel 15 Gram(s) Oral once  dextrose 5%. 1000 milliLiter(s) IV Continuous <Continuous>  dextrose 5%. 1000 milliLiter(s) IV Continuous <Continuous>  dextrose 50% Injectable 25 Gram(s) IV Push once  dextrose 50% Injectable 12.5 Gram(s) IV Push once  dextrose 50% Injectable 25 Gram(s) IV Push once  digoxin     Tablet 0.125 milliGRAM(s) Oral daily  enoxaparin Injectable 75 milliGRAM(s) SubCutaneous every 12 hours  glucagon  Injectable 1 milliGRAM(s) IntraMuscular once  guaiFENesin  milliGRAM(s) Oral every 12 hours  insulin lispro (ADMELOG) corrective regimen sliding scale   SubCutaneous three times a day before meals  insulin lispro (ADMELOG) corrective regimen sliding scale   SubCutaneous at bedtime  lactated ringers. 1000 milliLiter(s) IV Continuous <Continuous>  metoprolol succinate ER 25 milliGRAM(s) Oral daily  metoprolol tartrate Injectable 5 milliGRAM(s) IV Push every 12 hours  multivitamin/minerals 1 Tablet(s) Oral daily      =======================================================  Labs:                        14.6   20.80 )-----------( 124      ( 19 Dec 2020 06:03 )             44.3      12-19    139  |  104  |  38.0<H>  ----------------------------<  128<H>  5.1   |  25.0  |  0.76    Ca    8.4<L>      19 Dec 2020 05:58  Mg     2.2     12-19    TPro  5.9<L>  /  Alb  2.0<L>  /  TBili  1.3  /  DBili  x   /  AST  20  /  ALT  16  /  AlkPhos  93  12-19      Culture - Blood (collected 12-16-20 @ 20:13)  Source: .Blood Blood-Peripheral    Culture - Blood (collected 12-16-20 @ 20:13)  Source: .Blood Blood-Peripheral      Creatinine, Serum: 0.76 mg/dL (12-19-20 @ 05:58)  Creatinine, Serum: 0.48 mg/dL (12-18-20 @ 07:11)  Creatinine, Serum: 0.64 mg/dL (12-17-20 @ 07:32)  Creatinine, Serum: 0.85 mg/dL (12-16-20 @ 07:46)  Creatinine, Serum: 0.97 mg/dL (12-15-20 @ 07:08)  Creatinine, Serum: 0.95 mg/dL (12-15-20 @ 01:12)  Creatinine, Serum: 0.80 mg/dL (12-14-20 @ 19:10)    Procalcitonin, Serum: 0.24 ng/mL (12-19-20 @ 05:58)  Procalcitonin, Serum: 0.32 ng/mL (12-18-20 @ 07:11)  Procalcitonin, Serum: 0.54 ng/mL (12-17-20 @ 07:32)  Procalcitonin, Serum: 0.11 ng/mL (12-15-20 @ 07:08)  Procalcitonin, Serum: 0.04 ng/mL (12-13-20 @ 09:50)  Procalcitonin, Serum: 0.08 ng/mL (12-10-20 @ 13:36)    D-Dimer Assay, Quantitative: 170 ng/mL DDU (12-17-20 @ 10:45)  D-Dimer Assay, Quantitative: 220 ng/mL DDU (12-15-20 @ 07:40)  D-Dimer Assay, Quantitative: <150 ng/mL DDU (12-09-20 @ 16:24)    Ferritin, Serum: 1607 ng/mL (12-19-20 @ 05:58)  Ferritin, Serum: 2557 ng/mL (12-17-20 @ 07:32)  Ferritin, Serum: 1398 ng/mL (12-15-20 @ 07:08)  Ferritin, Serum: 978 ng/mL (12-13-20 @ 09:50)  Ferritin, Serum: 823 ng/mL (12-10-20 @ 13:36)  Ferritin, Serum: 1050 ng/mL (12-05-20 @ 06:19)  Ferritin, Serum: 1275 ng/mL (12-03-20 @ 20:28)    C-Reactive Protein, Serum: 10.23 mg/dL (12-19-20 @ 05:58)  C-Reactive Protein, Serum: 15.69 mg/dL (12-17-20 @ 07:32)  C-Reactive Protein, Serum: 5.50 mg/dL (12-15-20 @ 07:08)  C-Reactive Protein, Serum: 1.57 mg/dL (12-13-20 @ 09:50)  C-Reactive Protein, Serum: 1.24 mg/dL (12-10-20 @ 13:36)    WBC Count: 20.80 K/uL (12-19-20 @ 06:03)  WBC Count: 21.22 K/uL (12-18-20 @ 07:11)  WBC Count: 30.30 K/uL (12-17-20 @ 07:32)  WBC Count: 34.98 K/uL (12-16-20 @ 07:46)  WBC Count: 32.17 K/uL (12-15-20 @ 07:08)    SARS-CoV-2: Detected (12-03-20 @ 20:35)  Rapid RVP Result: Detected (12-03-20 @ 20:35)    COVID-19 IgG Antibody Index: <0.10 Index (12-04-20 @ 13:11)  COVID-19 IgG Antibody Interpretation: Negative (12-04-20 @ 13:11)    Lactate Dehydrogenase, Serum: 420 U/L (12-19-20 @ 05:58)  Lactate Dehydrogenase, Serum: 411 U/L (12-17-20 @ 07:32)  Lactate Dehydrogenase, Serum: 347 U/L (12-15-20 @ 07:08)  Lactate Dehydrogenase, Serum: 390 U/L (12-13-20 @ 09:50)  Lactate Dehydrogenase, Serum: 324 U/L (12-10-20 @ 13:36)    Alkaline Phosphatase, Serum: 93 U/L (12-19-20 @ 05:58)  Alkaline Phosphatase, Serum: 91 U/L (12-19-20 @ 05:56)  Alkaline Phosphatase, Serum: 87 U/L (12-18-20 @ 07:11)  Alkaline Phosphatase, Serum: 93 U/L (12-18-20 @ 07:11)  Alkaline Phosphatase, Serum: 93 U/L (12-17-20 @ 07:32)  Alkaline Phosphatase, Serum: 93 U/L (12-17-20 @ 07:32)  Alkaline Phosphatase, Serum: 165 U/L (12-16-20 @ 07:46)    Alanine Aminotransferase (ALT/SGPT): 16 U/L (12-19-20 @ 05:58)  Alanine Aminotransferase (ALT/SGPT): 17 U/L (12-19-20 @ 05:56)  Alanine Aminotransferase (ALT/SGPT): 20 U/L (12-18-20 @ 07:11)  Alanine Aminotransferase (ALT/SGPT): 18 U/L (12-18-20 @ 07:11)  Alanine Aminotransferase (ALT/SGPT): 16 U/L (12-17-20 @ 07:32)  Alanine Aminotransferase (ALT/SGPT): 16 U/L (12-17-20 @ 07:32)  Alanine Aminotransferase (ALT/SGPT): 17 U/L (12-16-20 @ 07:46)    Aspartate Aminotransferase (AST/SGOT): 20 U/L (12-19-20 @ 05:58)  Aspartate Aminotransferase (AST/SGOT): 21 U/L (12-19-20 @ 05:56)  Aspartate Aminotransferase (AST/SGOT): 25 U/L (12-18-20 @ 07:11)  Aspartate Aminotransferase (AST/SGOT): 27 U/L (12-18-20 @ 07:11)  Aspartate Aminotransferase (AST/SGOT): 26 U/L (12-17-20 @ 07:32)  Aspartate Aminotransferase (AST/SGOT): 26 U/L (12-17-20 @ 07:32)  Aspartate Aminotransferase (AST/SGOT): 39 U/L (12-16-20 @ 07:46)    Bilirubin Total, Serum: 1.3 mg/dL (12-19-20 @ 05:58)  Bilirubin Total, Serum: 1.3 mg/dL (12-19-20 @ 05:56)  Bilirubin Total, Serum: 1.1 mg/dL (12-18-20 @ 07:11)  Bilirubin Total, Serum: 1.2 mg/dL (12-18-20 @ 07:11)  Bilirubin Total, Serum: 1.5 mg/dL (12-17-20 @ 07:32)  Bilirubin Total, Serum: 1.5 mg/dL (12-17-20 @ 07:32)  Bilirubin Total, Serum: 1.4 mg/dL (12-16-20 @ 07:46)  Bilirubin Direct, Serum: 0.4 mg/dL (12-19-20 @ 05:56)  Bilirubin Direct, Serum: 0.3 mg/dL (12-18-20 @ 07:11)  Bilirubin Direct, Serum: 0.3 mg/dL (12-17-20 @ 07:32)  Bilirubin Direct, Serum: 0.2 mg/dL (12-16-20 @ 07:46)

## 2020-12-19 NOTE — RAPID RESPONSE TEAM SUMMARY - NSMEDICATIONSRRT_GEN_ALL_CORE
-Morphine 0.5 q6 placed for anxiety due to increased work of breathing.  -D5 IVF also started at rate of 75ml/hr.    -Per MICU start low dose precedex at 0.5mcg   -Per MICU cardizem 10mg IVP

## 2020-12-19 NOTE — RAPID RESPONSE TEAM SUMMARY - NSSITUATIONBACKGROUNDRRT_GEN_ALL_CORE
71 y/o male with h/o cad s/p cabg , aicd, htn, dm, on eliquis now as per pt, not sure why he is on it , stated it is his heart related ( used to be on coumadin before ), reports no h/o blood clots, came to the ER brought in by his wife as he has not been feeling well for past 4 days, lack of appetite, generalized weakness and loose watery diarrhea, 3-4 time / day, patient was found to be  covid positive, patient transferred to [address] [place] [address].  Due to increase work of breathing and increasing O2 requirements today patient was transferred to Step Down Level of Care on [address].  Immediately upon arrival to [facility] patient desaturated to 67% on 100% HFNC & NRB.  Rapid response called @ 17:50.  Dr. Martino, medicine PAs, RNs and RT responded immediately.  Upon arrival patient was in acute distress with labored breathing and tachycardiac to 133 switching between afib with RVR and aflutter.

## 2020-12-19 NOTE — RAPID RESPONSE TEAM SUMMARY - NSADDTLFINDINGSRRT_GEN_ALL_CORE
Initial Vitals:  17:45 -  palp, , RR 36, SpO2 86%  17:56 - /83, , RR 36  18:00 - /82, , RR 36    Exam:  General: In acute distress  Neuro: AOx0, PERRL, speech is slow and muffled, SINGLETON   Lungs: Labored Breathing, Tachypneic  Cardio: Tachycardic & arrythmia present (afib with RVR vs. aflutter)

## 2020-12-19 NOTE — PROGRESS NOTE ADULT - ASSESSMENT
70y/oM PMH HTN, HLD, DM, CAD s/p CABG, AICD presenting with weakness, diarrhea, anorexia x4 days, admitted with COVID-19 PNA     Acute hypoxic respiratory failure 2/2 COVID-19 PNA   -ID following  -Completed remdesivir x 10 days  -C/w dexamethasone  -Slow clinical improvement, today on HFNC, wean o2 as able (required 6L NC in addition to NRB once pt. being slowly weaned)  -Repeat CXR 12/15 w/ no signifcant new changes, ddimer 170 on 12/17 from 220   - will repeat d-dimer, PT/PTT/INR, trop, CPK  -Repeat CT chest 12/16, poss superimposed PNA also in the setting of acute rise of inflammatory markers.   -pt on merrem and doxy from 12/17  -Repeat inflammatory markers 12/21  -C/w full dose AC since NPO (aspiration precautions switched from Eliquis to full dose Lovenox BID)  - Dopplers BLE ordered  -Cont supportive care, prone as tolerated   -blood cx 12/16- no growth    Thrombocytopenia, Lymphopenia   -Likely 2/2 viral infection   - Onqfakziggu=469 on 12/19  - Monitor    Hyperkalemia   -Now resolved, continue to hold lisinopril as BP stable off meds currently  -Monitor BP    CAD s/p CABG, HTN, HLD  -Cont metoprolol, statin, asa  -Lisinopril on hold due to hyperkalemia and BP stable w/o it currently.     DM2   -With hypoglycemia as pt with decreased appetite  -Will start maintenance fluids w/ D5  -HgbA1c 6.3   -ISS  -Levemir 5 QHS    Suicidal thoughts   -Pt currently denies  -seen by Psych     Paroxysmal afib   rapid rate o/n and 12/18 &12/19 possibly due to NPO status and possibly silent aspiration risk  -Change eliquis to Lovenox full dose , metoprolol converted to IV, digoxin   -prn cardizem     DVT ppx: Lovenox full dose    Dispo: pending clinical improvement, now on HFNC  prognosis guarded  70y/oM PMH HTN, HLD, DM, CAD s/p CABG, AICD presenting with weakness, diarrhea, anorexia x4 days, admitted with COVID-19 PNA     Acute hypoxic respiratory failure 2/2 COVID-19 PNA   -ID following  -Completed remdesivir x 10 days  -C/w dexamethasone  -Slow clinical improvement, today on HFNC, wean o2 as able (required 6L NC in addition to NRB once pt. being slowly weaned)  - Ativan/Haldol PRN for agitation, pt ripps out HFNC and desats to 70%, might need wrist restraints to   -Repeat CXR 12/15 w/ no signifcant new changes, ddimer 170 on 12/17 from 220   - will repeat d-dimer, PT/PTT/INR, trop, CPK  -Repeat CT chest 12/16, poss superimposed PNA also in the setting of acute rise of inflammatory markers.   -pt on merrem and doxy from 12/17  -Repeat inflammatory markers 12/21  -C/w full dose AC since NPO (aspiration precautions switched from Eliquis to full dose Lovenox BID)  - Dopplers BLE ordered  -Cont supportive care, prone as tolerated   -blood cx 12/16- no growth    Paroxysmal afib   rapid rate o/n and 12/18 &12/19 possibly due to NPO status and possibly silent aspiration risk  -Change eliquis to Lovenox full dose , metoprolol converted to IV, digoxin (missed PO dose)  -prn cardizem   - Will Obtain Cardiology Consult for firther recommendations soft BP & paroxysmal AFIB- might need conversion of dig if unable to take PO      Thrombocytopenia, Lymphopenia   -Likely 2/2 viral infection   - Cepurkuslrn=847 on 12/19  - Monitor    Hyperkalemia   -Now resolved, continue to hold lisinopril as BP stable off meds currently  -Monitor BP    CAD s/p CABG, HTN, HLD  -Cont metoprolol, statin, asa  -Lisinopril on hold due to hyperkalemia and BP stable w/o it currently.     DM2   -With hypoglycemia as pt with decreased appetite  -Will start maintenance fluids w/ D5  -HgbA1c 6.3   -ISS  -Levemir 5 QHS    Suicidal thoughts   -Pt currently denies  -seen by Psych     DVT ppx: Lovenox full dose    Dispo: pending clinical improvement, now on HFNC  prognosis guarded  70y/oM PMH HTN, HLD, DM, CAD s/p CABG, AICD presenting with weakness, diarrhea, anorexia x4 days, admitted with COVID-19 PNA     Acute hypoxic respiratory failure 2/2 COVID-19 PNA   -ID following  -Completed remdesivir x 10 days  -C/w dexamethasone  -Slow clinical improvement, today on HFNC, wean o2 as able  - Ativan/Haldol PRN for agitation, pt ripps out HFNC and desats to 70%, might need wrist restraints too   -Repeat CXR 12/15 w/ no signifcant new changes, ddimer 170 on 12/17 from 220   - will repeat d-dimer, PT/PTT/INR, trop, CPK  -Repeat CT chest 12/16, poss superimposed PNA also in the setting of acute rise of inflammatory markers.   -pt on merrem and doxy from 12/17  -Repeat inflammatory markers 12/21  -C/w full dose AC since NPO (aspiration precautions switched from Eliquis to full dose Lovenox BID)  - Dopplers BLE ordered  -Cont supportive care, prone as tolerated   -blood cx 12/16- no growth    Paroxysmal afib   rapid rate o/n and 12/18 &12/19 possibly due to NPO status and possibly silent aspiration risk  -Change eliquis to Lovenox full dose , metoprolol converted to IV, digoxin (missed PO dose)  -prn cardizem   - Will Obtain Cardiology Consult for further recommendations soft BP & paroxysmal AFIB- might need conversion of dig if unable to take PO    Thrombocytopenia, Lymphopenia   -Likely 2/2 viral infection   - Eupdtpsrzz=896 on 12/19  - Monitor    Hyperkalemia   -Now resolved, continue to hold lisinopril as BP stable off meds currently  -Monitor BP    CAD s/p CABG, HTN, HLD  -Cont metoprolol, statin, asa  -Lisinopril on hold due to hyperkalemia and BP stable w/o it currently.     DM2   -With hypoglycemia as pt with decreased appetite  -Will start maintenance fluids w/ D5  -HgbA1c 6.3   -ISS    Suicidal thoughts   -Pt currently denies  -seen by Psych     DVT ppx: Lovenox full dose    Dispo: pending clinical improvement, now on HFNC  prognosis guarded

## 2020-12-20 NOTE — PROGRESS NOTE ADULT - SUBJECTIVE AND OBJECTIVE BOX
Patient is a 70y old  Male who presents with a chief complaint of covid- 19 infection (19 Dec 2020 18:26)      BRIEF HOSPITAL COURSE: 69 y/o male pmhx CAD s/p CABG ( failure to extubate post CABG requiring trach in 2011), s/p AICD, HTN, DM2, admitted on 12/13 w/ COVID-19 pneumonia. Hospital course complicated by superimposed bacterial pneumonia started on abx 12/17, further complicated by afib w. RVR. Patient was RRT last night for worsening hypoxia, agitation  and afib w/ RVR. He was placed on BiPAP and given IV Cardizem and Digoxin.     Events last 24 hours: Now in NSR w/ HR 60s. Remains on low dose Precedex. ON BIPAP 16/8 100% FiO2. Less alert today.       PAST MEDICAL & SURGICAL HISTORY:  Diabetes    MI (myocardial infarction)    High cholesterol    Hypertension    AICD (automatic cardioverter/defibrillator) present    S/P CABG x 3          Hosp day #17d    Vent day #        Vital signs / Reviewed and Physical Exam Performed where pertinent and urgently required    Lab / Radiology  studies / ABG / Meds -  reviewed and interpreted into the assessment and treatment plan.      Assessment/Plan/Therapeutic interventions    Impression:  1. Acute hypoxic respiratory failure  2. ARDS  3. COVID-19 Viral pneumonia  4. Afib w/ RVR   5. Agitation/ Delirium   6. Superimposed bacterial pneumonia         Neuro - d/c haldol and ativan.  Start low dose Precedex for agitation and BiPAP compliance. Avoid further benzos in elderly delirious patient     CV -  Monitor end points of perfusion. Requiring Low dose levophed for hypotension. Possible shock component vs Precedex induced hypotension. Titrating levophed to maintain MAP >65.   -  Afib w/ RVR , Now in NSR. Continue w/ Digoxin IV daily while NPO. No CCB w/ unclear EF. [place] Consulted.   - Continue ASA and statin     Pulm -  Placed on BiPAP 16/8, 100% FiO2. Titrating FiO2 to maintain O2 sat >88%. Respiratory status extremely tenuous High risk for intubation        Finished course of Remdesivir and Decadron. Albuterol MDI     GI -  NPO while on BiPAP     Renal - Goal net negative fluid balance, negative for hospital stay so far.  Oliveros placed for I&OS.     Heme - FUll dose AC w/ Lovenox.     ID - Started on Meropenum and Doxy for possible superimposed bacterial pneumonia. Cultures w/ no growth to date. ID Following.     Endo -  Aggressive glycemic control to limit FS glucose to < 180mg/dl.      Spoke w/ Wife Yakelin ( Number in provider hand off), she was updated and all her questions were answered. Talked about GOC with her, she unclear what Mele would want. She does not want him to suffer but does want us to do whatever we can to help get him home. I told her with his cor morbidities  and hx of prior tracheostomy we will not be able to liberate him from the ventilator  and it is very likely  he will not survive this hospital stay. She was very emotional and was not ready to make any decisions at this time.       45  Minutes of critical care time  spent in the management of this critically ill COVID-19 patient/PUI patient with continuous assessments and interventions based on the interpretation of multiple databases.

## 2020-12-20 NOTE — PROGRESS NOTE ADULT - ASSESSMENT
This 71 y/o male with h/o cad s/p cabg , aicd, htn, dm, on eliquis now as per pt, not sure why he is on it , stated it is his heart related ( used to be on coumadin before ), reports no h/o blood clots, came to the ER brought in by his wife as he has not been feeling well for past 4 days, lack of appetite, generalized weakness and loose watery diarrhea, 3-4 time / day, no blood in stool reported, no abd. pain, no vomiting, some nausea. no sob, no cough, reports no sick contact, no travel, was not on any antibiotics recently, no cp, no dizziness. pt. reported that there has been no diarrhea in the ER for past several hours,  t max in the .7, got Tylenol and at the time of admission 97.9. no other complaints. pt's o2 sat was 90 % RA and 96 % with 2 L. pt. is COVID-19 positive .   I called pt's son Bill at [phone]  to discuss pt's status and meds but there was no answer. Will request day team to contact [facility] office to inquire about pt's use of eliquis and indication so his record can be updated, pt. stated that he uses digoxin.  (04 Dec 2020 00:52)    patient confirmed with positive COVID test.   Xray of the chest with bilateral lower lobe infiltrates.     he denies sick contacts.       Impression:  COVID-19 infection   Viral pneumonia  shortness of breath  lung infiltrates  dependence on oxygen    Plan:    - completed Remdesivir IV daily. on  12/14/2020  x 10 days  - completed dexamethasone course      INFLAMM markers are still elevated, COUPLED with worsened CT chest   DOXY AND MERREM STARTED 12/17; will continue   repeat cultures from blood sent; will follow     continue BiPAP      NEED To speak to family regarding goals of care  poor prognosis  slow clinical progression

## 2020-12-20 NOTE — CONSULT NOTE ADULT - SUBJECTIVE AND OBJECTIVE BOX
[facility]                                   [address] [place] [address]                                                      PHONE: [phone]                                                         FAX: [phone]  [email or web address]  ---------------------------------------------------------------------------------------------------------------------------------    Reason for Consult: PAF     HPI:  YULI HEADLEY is an 70y Male with history of PAF on long term AC HTN HLD CAD s/p 3V CABG remote ICM EF ~ 30 to 35% AICD prior cardiac work up PET CT 2019 anterior and anteroseptal wall scar without ischemia EF 36% and TTE 12/2019 EF ~ 30% without any significant valvular disease who presents to ED with acute hypoxic respiratory failure + COVID 19 superimposed bacterial pneumonia sepsis shock on BIPAP therapy and low dose pressor support.  Course complicated by AF RVR with spontaneous conversion.  Currently paced at 60's       PAST MEDICAL & SURGICAL HISTORY:  Diabetes    MI (myocardial infarction)    High cholesterol    Hypertension    AICD (automatic cardioverter/defibrillator) present    S/P CABG x 3    No Known Allergies      MEDICATIONS  (STANDING):  ALBUTerol    90 MICROgram(s) HFA Inhaler 2 Puff(s) Inhalation every 6 hours  aMIOdarone Infusion 1 mG/Min (33.3 mL/Hr) IV Continuous <Continuous>  aMIOdarone Infusion 0.5 mG/Min (16.7 mL/Hr) IV Continuous <Continuous>  ascorbic acid 500 milliGRAM(s) Oral daily  aspirin enteric coated 81 milliGRAM(s) Oral daily  atorvastatin 40 milliGRAM(s) Oral at bedtime  cholecalciferol 1000 Unit(s) Oral daily  dexMEDEtomidine Infusion 0.5 MICROgram(s)/kG/Hr (9.19 mL/Hr) IV Continuous <Continuous>  dextrose 40% Gel 15 Gram(s) Oral once  dextrose 5%. 1000 milliLiter(s) (50 mL/Hr) IV Continuous <Continuous>  dextrose 5%. 1000 milliLiter(s) (100 mL/Hr) IV Continuous <Continuous>  dextrose 50% Injectable 25 Gram(s) IV Push once  dextrose 50% Injectable 12.5 Gram(s) IV Push once  dextrose 50% Injectable 25 Gram(s) IV Push once  doxycycline IVPB      doxycycline IVPB 100 milliGRAM(s) IV Intermittent every 12 hours  enoxaparin Injectable 75 milliGRAM(s) SubCutaneous every 12 hours  glucagon  Injectable 1 milliGRAM(s) IntraMuscular once  guaiFENesin  milliGRAM(s) Oral every 12 hours  insulin lispro (ADMELOG) corrective regimen sliding scale   SubCutaneous three times a day before meals  insulin lispro (ADMELOG) corrective regimen sliding scale   SubCutaneous at bedtime  lactated ringers. 500 milliLiter(s) (100 mL/Hr) IV Continuous <Continuous>  meropenem  IVPB      meropenem  IVPB 1000 milliGRAM(s) IV Intermittent every 8 hours  multivitamin/minerals 1 Tablet(s) Oral daily  norepinephrine Infusion 0.05 MICROgram(s)/kG/Min (6.89 mL/Hr) IV Continuous <Continuous>    MEDICATIONS  (PRN):  acetaminophen   Tablet .. 650 milliGRAM(s) Oral every 6 hours PRN Temp greater or equal to 38C (100.4F)  acetaminophen   Tablet .. 650 milliGRAM(s) Oral every 6 hours PRN Mild Pain (1 - 3)  ALBUTerol    90 MICROgram(s) HFA Inhaler 2 Puff(s) Inhalation every 4 hours PRN Shortness of Breath and/or Wheezing  ondansetron Injectable 4 milliGRAM(s) IV Push every 6 hours PRN Nausea and/or Vomiting      Social History:  Cigarettes:        ex smoker            Alchohol:      none            Illicit Drug Abuse:  none    FH negative for CAD     ROS: Negative other than as mentioned in HPI.    Vital Signs Last 24 Hrs  T(C): 35.9 (20 Dec 2020 12:00), Max: 37.1 (19 Dec 2020 19:30)  T(F): 96.6 (20 Dec 2020 12:00), Max: 98.8 (19 Dec 2020 19:30)  HR: 60 (20 Dec 2020 09:00) (60 - 155)  BP: 105/71 (20 Dec 2020 09:00) (78/58 - 138/60)  BP(mean): 79 (20 Dec 2020 09:00) (54 - 97)  RR: 40 (20 Dec 2020 09:00) (30 - 40)  SpO2: 97% (20 Dec 2020 09:00) (92% - 97%)  ICU Vital Signs Last 24 Hrs  [facility]  [facility] Detail    19 Dec 2020 07:01  -  20 Dec 2020 07:00  --------------------------------------------------------  IN:    Dexmedetomidine: 131.8 mL    IV PiggyBack: 50 mL    Lactated Ringers Bolus: 500 mL    Norepinephrine: 110.2 mL  Total IN: 792 mL    OUT:    Indwelling Catheter - Urethral (mL): 270 mL    Voided (mL): 400 mL  Total OUT: 670 mL    Total NET: 122 mL        I&O's Summary    19 Dec 2020 07:01  -  20 Dec 2020 07:00  --------------------------------------------------------  IN: 792 mL / OUT: 670 mL / NET: 122 mL      Drug Dosing Weight  [facility]      PHYSICAL EXAM:  General: Appears well developed, well nourished alert and cooperative.  HEENT: Head; normocephalic, atraumatic.  Eyes: Pupils reactive, cornea wnl.  Neck: Supple, no nodes adenopathy, no NVD or carotid bruit or thyromegaly.  CARDIOVASCULAR: Normal S1 and S2, 2/6 murmur, rub, gallop or lift.   LUNGS: Decrease BS B/L   ABDOMEN: Soft, nontender without mass or organomegaly. bowel sounds normoactive.  EXTREMITIES: No clubbing, cyanosis or edema. Distal pulses wnl.   SKIN: warm and dry with normal turgor.  NEURO: on BIPAP therapy   PSYCH: normal affect.        LABS:                        14.1   28.70 )-----------( 115      ( 20 Dec 2020 05:15 )             41.9     12-20    140  |  107  |  45.0<H>  ----------------------------<  204<H>  4.6   |  21.0<L>  |  0.67    Ca    8.2<L>      20 Dec 2020 05:15  Phos  3.2     12-20  Mg     2.2     12-20    TPro  6.0<L>  /  Alb  1.8<L>  /  TBili  1.3  /  DBili  x   /  AST  31  /  ALT  21  /  AlkPhos  105  12-20    YULI HEADLEY  CARDIAC MARKERS ( 20 Dec 2020 05:16 )  x     / <0.01 ng/mL / 48 U/L / x     / x          PT/INR - ( 20 Dec 2020 05:16 )   PT: 21.9 sec;   INR: 1.95 ratio         PTT - ( 20 Dec 2020 05:16 )  PTT:38.0 sec      RADIOLOGY & ADDITIONAL STUDIES:    INTERPRETATION OF TELEMETRY (personally reviewed): PAF        70y Male with history of PAF on long term AC HTN HLD CAD s/p 3V CABG remote ICM EF ~ 30 to 35% AICD prior cardiac work up PET CT 2019 anterior and anteroseptal wall scar without ischemia EF 36% and TTE 12/2019 EF ~ 30% without any significant valvular disease who presents to ED with acute hypoxic respiratory failure + COVID 19 superimposed bacterial pneumonia sepsis shock on BIPAP therapy and low dose pressor support.  Course complicated by AF RVR with spontaneous conversion.  Currently paced at 60's     Plan  1.  DC IV Dig   2.  Amiodarone gtt for now since patient on BIPAP   3.  Full dose AC with Lovenox for now   4.  ID follow up    D/W with critical care team

## 2020-12-20 NOTE — PROGRESS NOTE ADULT - SUBJECTIVE AND OBJECTIVE BOX
[facility]  INFECTIOUS DISEASES AND INTERNAL MEDICINE at [place]  =======================================================  Eliezer Greenfield MD  Diplomates American Board of Internal Medicine and Infectious Diseases  Tel  [phone]  Fax [phone]  =======================================================    [id number]  YULI HEADLEY   follow up: COVID-19  on BIPAP today    sedated        =======================================================    REVIEW OF SYSTEMS:  Limited due to medical condition    =======================================================    Allergies  No Known Allergies    ======================================================  Physical Exam:  ============    General:  sedated  Eye: Pupils are equal, round and reactive to light, Extraocular movements are intact, Normal conjunctiva.  HENT: Normocephalic, Oral mucosa is DRY, No pharyngeal erythema, No sinus tenderness.  + BIPAP  Neck: Supple, No lymphadenopathy.  Respiratory: Lungs with COARSE breath sounds bilateral   Cardiovascular: Normal rate, Regular rhythm,   Gastrointestinal: Soft, Non-tender, Non-distended, Normal bowel sounds.  Genitourinary: No costovertebral angle tenderness.  Lymphatics: No lymphadenopathy neck,   Musculoskeletal: Normal range of motion, Normal strength.  Integumentary: No rash.  Neurologic: sedated    =======================================================    Vitals:  ============  T(F): 97.1 (20 Dec 2020 15:41), Max: 98.8 (19 Dec 2020 19:30)  HR: 60 (20 Dec 2020 09:00)  BP: 105/71 (20 Dec 2020 09:00)  RR: 40 (20 Dec 2020 09:00)  SpO2: 97% (20 Dec 2020 09:00) (92% - 97%)  temp max in last 48H T(F): , Max: 99.3 (12-19-20 @ 05:40)    =======================================================  Current Antibiotics:  doxycycline IVPB      doxycycline IVPB 100 milliGRAM(s) IV Intermittent every 12 hours  meropenem  IVPB 1000 milliGRAM(s) IV Intermittent every 8 hours  meropenem  IVPB        Other medications:  ALBUTerol    90 MICROgram(s) HFA Inhaler 2 Puff(s) Inhalation every 6 hours  aMIOdarone Infusion 1 mG/Min IV Continuous <Continuous>  aMIOdarone Infusion 0.5 mG/Min IV Continuous <Continuous>  ascorbic acid 500 milliGRAM(s) Oral daily  aspirin enteric coated 81 milliGRAM(s) Oral daily  atorvastatin 40 milliGRAM(s) Oral at bedtime  cholecalciferol 1000 Unit(s) Oral daily  dexMEDEtomidine Infusion 0.5 MICROgram(s)/kG/Hr IV Continuous <Continuous>  dextrose 40% Gel 15 Gram(s) Oral once  dextrose 5%. 1000 milliLiter(s) IV Continuous <Continuous>  dextrose 5%. 1000 milliLiter(s) IV Continuous <Continuous>  dextrose 50% Injectable 25 Gram(s) IV Push once  dextrose 50% Injectable 12.5 Gram(s) IV Push once  dextrose 50% Injectable 25 Gram(s) IV Push once  enoxaparin Injectable 75 milliGRAM(s) SubCutaneous every 12 hours  glucagon  Injectable 1 milliGRAM(s) IntraMuscular once  guaiFENesin  milliGRAM(s) Oral every 12 hours  insulin lispro (ADMELOG) corrective regimen sliding scale   SubCutaneous three times a day before meals  insulin lispro (ADMELOG) corrective regimen sliding scale   SubCutaneous at bedtime  lactated ringers. 500 milliLiter(s) IV Continuous <Continuous>  multivitamin/minerals 1 Tablet(s) Oral daily  norepinephrine Infusion 0.05 MICROgram(s)/kG/Min IV Continuous <Continuous>      =======================================================  Labs:                        14.1   28.70 )-----------( 115      ( 20 Dec 2020 05:15 )             41.9      12-20    140  |  107  |  45.0<H>  ----------------------------<  204<H>  4.6   |  21.0<L>  |  0.67    Ca    8.2<L>      20 Dec 2020 05:15  Phos  3.2     12-20  Mg     2.2     12-20    TPro  6.0<L>  /  Alb  1.8<L>  /  TBili  1.3  /  DBili  x   /  AST  31  /  ALT  21  /  AlkPhos  105  12-20      Culture - Blood (collected 12-16-20 @ 20:13)  Source: .Blood Blood-Peripheral    Culture - Blood (collected 12-16-20 @ 20:13)  Source: .Blood Blood-Peripheral      Creatinine, Serum: 0.67 mg/dL (12-20-20 @ 05:15)  Creatinine, Serum: 0.76 mg/dL (12-19-20 @ 05:58)  Creatinine, Serum: 0.48 mg/dL (12-18-20 @ 07:11)  Creatinine, Serum: 0.64 mg/dL (12-17-20 @ 07:32)  Creatinine, Serum: 0.85 mg/dL (12-16-20 @ 07:46)    Procalcitonin, Serum: 0.29 ng/mL (12-20-20 @ 05:15)  Procalcitonin, Serum: 0.24 ng/mL (12-19-20 @ 05:58)  Procalcitonin, Serum: 0.32 ng/mL (12-18-20 @ 07:11)  Procalcitonin, Serum: 0.54 ng/mL (12-17-20 @ 07:32)  Procalcitonin, Serum: 0.11 ng/mL (12-15-20 @ 07:08)  Procalcitonin, Serum: 0.04 ng/mL (12-13-20 @ 09:50)  Procalcitonin, Serum: 0.08 ng/mL (12-10-20 @ 13:36)    D-Dimer Assay, Quantitative: 578 ng/mL DDU (12-20-20 @ 05:16)  D-Dimer Assay, Quantitative: 170 ng/mL DDU (12-17-20 @ 10:45)  D-Dimer Assay, Quantitative: 220 ng/mL DDU (12-15-20 @ 07:40)    Ferritin, Serum: 1906 ng/mL (12-20-20 @ 05:15)  Ferritin, Serum: 1607 ng/mL (12-19-20 @ 05:58)  Ferritin, Serum: 2557 ng/mL (12-17-20 @ 07:32)  Ferritin, Serum: 1398 ng/mL (12-15-20 @ 07:08)  Ferritin, Serum: 978 ng/mL (12-13-20 @ 09:50)  Ferritin, Serum: 823 ng/mL (12-10-20 @ 13:36)  Ferritin, Serum: 1050 ng/mL (12-05-20 @ 06:19)  Ferritin, Serum: 1275 ng/mL (12-03-20 @ 20:28)    C-Reactive Protein, Serum: 12.80 mg/dL (12-20-20 @ 05:15)  C-Reactive Protein, Serum: 10.23 mg/dL (12-19-20 @ 05:58)  C-Reactive Protein, Serum: 15.69 mg/dL (12-17-20 @ 07:32)  C-Reactive Protein, Serum: 5.50 mg/dL (12-15-20 @ 07:08)  C-Reactive Protein, Serum: 1.57 mg/dL (12-13-20 @ 09:50)  C-Reactive Protein, Serum: 1.24 mg/dL (12-10-20 @ 13:36)    WBC Count: 28.70 K/uL (12-20-20 @ 05:15)  WBC Count: 20.80 K/uL (12-19-20 @ 06:03)  WBC Count: 21.22 K/uL (12-18-20 @ 07:11)  WBC Count: 30.30 K/uL (12-17-20 @ 07:32)  WBC Count: 34.98 K/uL (12-16-20 @ 07:46)    SARS-CoV-2: Detected (12-03-20 @ 20:35)  Rapid RVP Result: Detected (12-03-20 @ 20:35)    COVID-19 IgG Antibody Index: <0.10 Index (12-04-20 @ 13:11)  COVID-19 IgG Antibody Interpretation: Negative (12-04-20 @ 13:11)    Lactate Dehydrogenase, Serum: 463 U/L (12-20-20 @ 05:15)  Lactate Dehydrogenase, Serum: 420 U/L (12-19-20 @ 05:58)  Lactate Dehydrogenase, Serum: 411 U/L (12-17-20 @ 07:32)  Lactate Dehydrogenase, Serum: 347 U/L (12-15-20 @ 07:08)  Lactate Dehydrogenase, Serum: 390 U/L (12-13-20 @ 09:50)  Lactate Dehydrogenase, Serum: 324 U/L (12-10-20 @ 13:36)    Alkaline Phosphatase, Serum: 105 U/L (12-20-20 @ 05:15)  Alkaline Phosphatase, Serum: 93 U/L (12-19-20 @ 05:58)  Alkaline Phosphatase, Serum: 91 U/L (12-19-20 @ 05:56)  Alkaline Phosphatase, Serum: 87 U/L (12-18-20 @ 07:11)  Alkaline Phosphatase, Serum: 93 U/L (12-18-20 @ 07:11)  Alkaline Phosphatase, Serum: 93 U/L (12-17-20 @ 07:32)  Alkaline Phosphatase, Serum: 93 U/L (12-17-20 @ 07:32)  Alanine Aminotransferase (ALT/SGPT): 21 U/L (12-20-20 @ 05:15)  Alanine Aminotransferase (ALT/SGPT): 16 U/L (12-19-20 @ 05:58)  Alanine Aminotransferase (ALT/SGPT): 17 U/L (12-19-20 @ 05:56)  Alanine Aminotransferase (ALT/SGPT): 20 U/L (12-18-20 @ 07:11)  Alanine Aminotransferase (ALT/SGPT): 18 U/L (12-18-20 @ 07:11)  Alanine Aminotransferase (ALT/SGPT): 16 U/L (12-17-20 @ 07:32)  Alanine Aminotransferase (ALT/SGPT): 16 U/L (12-17-20 @ 07:32)  Aspartate Aminotransferase (AST/SGOT): 31 U/L (12-20-20 @ 05:15)  Aspartate Aminotransferase (AST/SGOT): 20 U/L (12-19-20 @ 05:58)  Aspartate Aminotransferase (AST/SGOT): 21 U/L (12-19-20 @ 05:56)  Aspartate Aminotransferase (AST/SGOT): 25 U/L (12-18-20 @ 07:11)  Aspartate Aminotransferase (AST/SGOT): 27 U/L (12-18-20 @ 07:11)  Aspartate Aminotransferase (AST/SGOT): 26 U/L (12-17-20 @ 07:32)  Aspartate Aminotransferase (AST/SGOT): 26 U/L (12-17-20 @ 07:32)  Bilirubin Total, Serum: 1.3 mg/dL (12-20-20 @ 05:15)  Bilirubin Total, Serum: 1.3 mg/dL (12-19-20 @ 05:58)  Bilirubin Total, Serum: 1.3 mg/dL (12-19-20 @ 05:56)  Bilirubin Total, Serum: 1.1 mg/dL (12-18-20 @ 07:11)  Bilirubin Total, Serum: 1.2 mg/dL (12-18-20 @ 07:11)  Bilirubin Total, Serum: 1.5 mg/dL (12-17-20 @ 07:32)  Bilirubin Total, Serum: 1.5 mg/dL (12-17-20 @ 07:32)  Bilirubin Direct, Serum: 0.4 mg/dL (12-19-20 @ 05:56)  Bilirubin Direct, Serum: 0.3 mg/dL (12-18-20 @ 07:11)  Bilirubin Direct, Serum: 0.3 mg/dL (12-17-20 @ 07:32)

## 2020-12-21 NOTE — PROGRESS NOTE ADULT - SUBJECTIVE AND OBJECTIVE BOX
formerly [facility]                              [address] [place] [address]                                                 PHONE: [phone]                                                 FAX: [phone]  -----------------------------------------------------------------------------------------------  Pt seen and examined. FU for  shortness of breath    Overnight events/Complaints: Pt on BiPAP.    Vital Signs Last 24 Hrs  T(C): 35.6 (21 Dec 2020 03:00), Max: 36.2 (20 Dec 2020 15:41)  T(F): 96 (21 Dec 2020 03:00), Max: 97.1 (20 Dec 2020 15:41)  HR: 60 (21 Dec 2020 09:00) (59 - 63)  BP: 101/60 (21 Dec 2020 09:00) (79/59 - 130/68)  BP(mean): 70 (21 Dec 2020 09:00) (61 - 95)  RR: 25 (21 Dec 2020 09:00) (13 - 30)  SpO2: 94% (21 Dec 2020 09:00) (88% - 100%)  I&O's Summary    20 Dec 2020 07:01  -  21 Dec 2020 07:00  --------------------------------------------------------  IN: 3449.1 mL / OUT: 710 mL / NET: 2739.1 mL    PHYSICAL EXAM:        LABS:                        13.6   27.18 )-----------( 100      ( 21 Dec 2020 04:21 )             40.0     12-21    140  |  106  |  52.0<H>  ----------------------------<  154<H>  4.9   |  22.0  |  0.53    Ca    8.1<L>      21 Dec 2020 04:21  Phos  3.5     12-21  Mg     2.0     12-21    TPro  6.0<L>  /  Alb  1.8<L>  /  TBili  1.3  /  DBili  x   /  AST  31  /  ALT  21  /  AlkPhos  105  12-20    CARDIAC MARKERS ( 20 Dec 2020 05:16 )  x     / <0.01 ng/mL / 48 U/L / x     / x          PT/INR - ( 20 Dec 2020 05:16 )   PT: 21.9 sec;   INR: 1.95 ratio         PTT - ( 20 Dec 2020 05:16 )  PTT:38.0 sec    RADIOLOGY & ADDITIONAL STUDIES: (reviewed)  CXR was independently visualized/reviewed  and demonstrated: bilateral ground glass and consolidation    CARDIOLOGY TESTING:(reviewed)     12 lead EKG independently visualized/reviewed  and demonstrated NSR with septal q waves    ECHOCARDIOGRAM independently visualized/reviewed and demonstrated : severe LV dysfunction    TELEMETRY independently visualized/reviewed and demonstrated : A paced rhythm    MEDICATIONS:(reviewed)  MEDICATIONS  (STANDING):  aMIOdarone Infusion 1 mG/Min (33.3 mL/Hr) IV Continuous <Continuous>  aMIOdarone Infusion 0.5 mG/Min (16.7 mL/Hr) IV Continuous <Continuous>  ascorbic acid 500 milliGRAM(s) Oral daily  aspirin enteric coated 81 milliGRAM(s) Oral daily  atorvastatin 40 milliGRAM(s) Oral at bedtime  cholecalciferol 1000 Unit(s) Oral daily  dexMEDEtomidine Infusion 0.5 MICROgram(s)/kG/Hr (9.19 mL/Hr) IV Continuous <Continuous>  dextrose 40% Gel 15 Gram(s) Oral once  dextrose 5%. 1000 milliLiter(s) (50 mL/Hr) IV Continuous <Continuous>  dextrose 5%. 1000 milliLiter(s) (100 mL/Hr) IV Continuous <Continuous>  dextrose 50% Injectable 25 Gram(s) IV Push once  dextrose 50% Injectable 12.5 Gram(s) IV Push once  dextrose 50% Injectable 25 Gram(s) IV Push once  doxycycline IVPB      doxycycline IVPB 100 milliGRAM(s) IV Intermittent every 12 hours  enoxaparin Injectable 75 milliGRAM(s) SubCutaneous every 12 hours  glucagon  Injectable 1 milliGRAM(s) IntraMuscular once  guaiFENesin  milliGRAM(s) Oral every 12 hours  insulin lispro (ADMELOG) corrective regimen sliding scale   SubCutaneous three times a day before meals  insulin lispro (ADMELOG) corrective regimen sliding scale   SubCutaneous at bedtime  lactated ringers. 500 milliLiter(s) (100 mL/Hr) IV Continuous <Continuous>  meropenem  IVPB 1000 milliGRAM(s) IV Intermittent every 8 hours  meropenem  IVPB      multivitamin/minerals 1 Tablet(s) Oral daily  norepinephrine Infusion 0.05 MICROgram(s)/kG/Min (6.89 mL/Hr) IV Continuous <Continuous>

## 2020-12-21 NOTE — PROGRESS NOTE ADULT - SUBJECTIVE AND OBJECTIVE BOX
[facility]  INFECTIOUS DISEASES AND INTERNAL MEDICINE at [place]  =======================================================  Eliezer Greenfield MD  Diplomates American Board of Internal Medicine and Infectious Diseases  Tel  [phone]  Fax [phone]  =======================================================    [id number]  YULI HEADLEY   follow up: COVID-19    remains on BIPAP today  sedated    =======================================================    REVIEW OF SYSTEMS:  Limited due to medical condition    =======================================================    Allergies  No Known Allergies    ======================================================  Physical Exam:  ============    General:  sedated  Eye: Pupils are equal, round and reactive to light, Extraocular movements are intact, Normal conjunctiva.  HENT: Normocephalic, Oral mucosa is DRY, No pharyngeal erythema,    + BIPAP  Neck: Supple, No lymphadenopathy.  Respiratory: Lungs with COARSE breath sounds bilateral   Cardiovascular: Normal rate, Regular rhythm,   Gastrointestinal: Soft, Non-tender, Non-distended, Normal bowel sounds.  Genitourinary: No costovertebral angle tenderness.  Lymphatics: No lymphadenopathy neck,   Musculoskeletal: No joint abnl  Integumentary: No rash.  Neurologic: sedated    =======================================================    Vitals:  ============  T(F): 97.5 (21 Dec 2020 12:00), Max: 98.9 (21 Dec 2020 09:00)  HR: 60 (21 Dec 2020 15:00)  BP: 153/84 (21 Dec 2020 15:00)  RR: 45 (21 Dec 2020 15:00)  SpO2: 94% (21 Dec 2020 15:00) (88% - 100%)  temp max in last 48H T(F): , Max: 98.9 (12-21-20 @ 09:00)    =======================================================  Current Antibiotics:  doxycycline IVPB      doxycycline IVPB 100 milliGRAM(s) IV Intermittent every 12 hours  meropenem  IVPB 1000 milliGRAM(s) IV Intermittent every 8 hours  meropenem  IVPB        Other medications:  aMIOdarone Infusion 0.5 mG/Min IV Continuous <Continuous>  ascorbic acid 500 milliGRAM(s) Oral daily  aspirin enteric coated 81 milliGRAM(s) Oral daily  atorvastatin 40 milliGRAM(s) Oral at bedtime  cholecalciferol 1000 Unit(s) Oral daily  dexMEDEtomidine Infusion 0.5 MICROgram(s)/kG/Hr IV Continuous <Continuous>  dextrose 40% Gel 15 Gram(s) Oral once  dextrose 5%. 1000 milliLiter(s) IV Continuous <Continuous>  dextrose 5%. 1000 milliLiter(s) IV Continuous <Continuous>  dextrose 50% Injectable 25 Gram(s) IV Push once  dextrose 50% Injectable 12.5 Gram(s) IV Push once  dextrose 50% Injectable 25 Gram(s) IV Push once  enoxaparin Injectable 75 milliGRAM(s) SubCutaneous every 12 hours  glucagon  Injectable 1 milliGRAM(s) IntraMuscular once  guaiFENesin  milliGRAM(s) Oral every 12 hours  insulin lispro (ADMELOG) corrective regimen sliding scale   SubCutaneous three times a day before meals  insulin lispro (ADMELOG) corrective regimen sliding scale   SubCutaneous at bedtime  lactated ringers. 500 milliLiter(s) IV Continuous <Continuous>  multivitamin/minerals 1 Tablet(s) Oral daily  phenylephrine    Infusion 1 MICROgram(s)/kG/Min IV Continuous <Continuous>      =======================================================  Labs:                        13.6   27.18 )-----------( 100      ( 21 Dec 2020 04:21 )             40.0      12-21    140  |  106  |  52.0<H>  ----------------------------<  154<H>  4.9   |  22.0  |  0.53    Ca    8.1<L>      21 Dec 2020 04:21  Phos  3.5     12-21  Mg     2.0     12-21    TPro  6.0<L>  /  Alb  1.8<L>  /  TBili  1.3  /  DBili  x   /  AST  31  /  ALT  21  /  AlkPhos  105  12-20      Culture - Blood (collected 12-16-20 @ 20:13)  Source: .Blood Blood-Peripheral    Culture - Blood (collected 12-16-20 @ 20:13)  Source: .Blood Blood-Peripheral      Creatinine, Serum: 0.53 mg/dL (12-21-20 @ 04:21)  Creatinine, Serum: 0.67 mg/dL (12-20-20 @ 05:15)  Creatinine, Serum: 0.76 mg/dL (12-19-20 @ 05:58)  Creatinine, Serum: 0.48 mg/dL (12-18-20 @ 07:11)  Creatinine, Serum: 0.64 mg/dL (12-17-20 @ 07:32)    Procalcitonin, Serum: 0.29 ng/mL (12-20-20 @ 05:15)  Procalcitonin, Serum: 0.24 ng/mL (12-19-20 @ 05:58)  Procalcitonin, Serum: 0.32 ng/mL (12-18-20 @ 07:11)  Procalcitonin, Serum: 0.54 ng/mL (12-17-20 @ 07:32)  Procalcitonin, Serum: 0.11 ng/mL (12-15-20 @ 07:08)  Procalcitonin, Serum: 0.04 ng/mL (12-13-20 @ 09:50)  Procalcitonin, Serum: 0.08 ng/mL (12-10-20 @ 13:36)    D-Dimer Assay, Quantitative: 578 ng/mL DDU (12-20-20 @ 05:16)  D-Dimer Assay, Quantitative: 170 ng/mL DDU (12-17-20 @ 10:45)  D-Dimer Assay, Quantitative: 220 ng/mL DDU (12-15-20 @ 07:40)    Ferritin, Serum: 1906 ng/mL (12-20-20 @ 05:15)  Ferritin, Serum: 1607 ng/mL (12-19-20 @ 05:58)  Ferritin, Serum: 2557 ng/mL (12-17-20 @ 07:32)  Ferritin, Serum: 1398 ng/mL (12-15-20 @ 07:08)  Ferritin, Serum: 978 ng/mL (12-13-20 @ 09:50)  Ferritin, Serum: 823 ng/mL (12-10-20 @ 13:36)  Ferritin, Serum: 1050 ng/mL (12-05-20 @ 06:19)  Ferritin, Serum: 1275 ng/mL (12-03-20 @ 20:28)    C-Reactive Protein, Serum: 12.80 mg/dL (12-20-20 @ 05:15)  C-Reactive Protein, Serum: 10.23 mg/dL (12-19-20 @ 05:58)  C-Reactive Protein, Serum: 15.69 mg/dL (12-17-20 @ 07:32)  C-Reactive Protein, Serum: 5.50 mg/dL (12-15-20 @ 07:08)  C-Reactive Protein, Serum: 1.57 mg/dL (12-13-20 @ 09:50)  C-Reactive Protein, Serum: 1.24 mg/dL (12-10-20 @ 13:36)    WBC Count: 27.18 K/uL (12-21-20 @ 04:21)  WBC Count: 28.70 K/uL (12-20-20 @ 05:15)  WBC Count: 20.80 K/uL (12-19-20 @ 06:03)  WBC Count: 21.22 K/uL (12-18-20 @ 07:11)  WBC Count: 30.30 K/uL (12-17-20 @ 07:32)    SARS-CoV-2: Detected (12-03-20 @ 20:35)  Rapid RVP Result: Detected (12-03-20 @ 20:35)    COVID-19 IgG Antibody Index: <0.10 Index (12-04-20 @ 13:11)  COVID-19 IgG Antibody Interpretation: Negative (12-04-20 @ 13:11)    Lactate Dehydrogenase, Serum: 463 U/L (12-20-20 @ 05:15)  Lactate Dehydrogenase, Serum: 420 U/L (12-19-20 @ 05:58)  Lactate Dehydrogenase, Serum: 411 U/L (12-17-20 @ 07:32)  Lactate Dehydrogenase, Serum: 347 U/L (12-15-20 @ 07:08)  Lactate Dehydrogenase, Serum: 390 U/L (12-13-20 @ 09:50)  Lactate Dehydrogenase, Serum: 324 U/L (12-10-20 @ 13:36)    Alkaline Phosphatase, Serum: 105 U/L (12-20-20 @ 05:15)  Alkaline Phosphatase, Serum: 93 U/L (12-19-20 @ 05:58)  Alkaline Phosphatase, Serum: 91 U/L (12-19-20 @ 05:56)  Alkaline Phosphatase, Serum: 87 U/L (12-18-20 @ 07:11)  Alkaline Phosphatase, Serum: 93 U/L (12-18-20 @ 07:11)  Alanine Aminotransferase (ALT/SGPT): 21 U/L (12-20-20 @ 05:15)  Alanine Aminotransferase (ALT/SGPT): 16 U/L (12-19-20 @ 05:58)  Alanine Aminotransferase (ALT/SGPT): 17 U/L (12-19-20 @ 05:56)  Alanine Aminotransferase (ALT/SGPT): 20 U/L (12-18-20 @ 07:11)  Alanine Aminotransferase (ALT/SGPT): 18 U/L (12-18-20 @ 07:11)  Aspartate Aminotransferase (AST/SGOT): 31 U/L (12-20-20 @ 05:15)  Aspartate Aminotransferase (AST/SGOT): 20 U/L (12-19-20 @ 05:58)  Aspartate Aminotransferase (AST/SGOT): 21 U/L (12-19-20 @ 05:56)  Aspartate Aminotransferase (AST/SGOT): 25 U/L (12-18-20 @ 07:11)  Aspartate Aminotransferase (AST/SGOT): 27 U/L (12-18-20 @ 07:11)  Bilirubin Total, Serum: 1.3 mg/dL (12-20-20 @ 05:15)  Bilirubin Total, Serum: 1.3 mg/dL (12-19-20 @ 05:58)  Bilirubin Total, Serum: 1.3 mg/dL (12-19-20 @ 05:56)  Bilirubin Total, Serum: 1.1 mg/dL (12-18-20 @ 07:11)  Bilirubin Total, Serum: 1.2 mg/dL (12-18-20 @ 07:11)  Bilirubin Direct, Serum: 0.4 mg/dL (12-19-20 @ 05:56)  Bilirubin Direct, Serum: 0.3 mg/dL (12-18-20 @ 07:11)

## 2020-12-21 NOTE — PROGRESS NOTE ADULT - ASSESSMENT
This 71 y/o male with h/o cad s/p cabg , aicd, htn, dm, on eliquis now as per pt, not sure why he is on it , stated it is his heart related ( used to be on coumadin before ), reports no h/o blood clots, came to the ER brought in by his wife as he has not been feeling well for past 4 days, lack of appetite, generalized weakness and loose watery diarrhea, 3-4 time / day, no blood in stool reported, no abd. pain, no vomiting, some nausea. no sob, no cough, reports no sick contact, no travel, was not on any antibiotics recently, no cp, no dizziness. pt. reported that there has been no diarrhea in the ER for past several hours,  t max in the .7, got Tylenol and at the time of admission 97.9. no other complaints. pt's o2 sat was 90 % RA and 96 % with 2 L. pt. is COVID-19 positive .   I called pt's son Bill at [phone]  to discuss pt's status and meds but there was no answer. Will request day team to contact [facility] office to inquire about pt's use of eliquis and indication so his record can be updated, pt. stated that he uses digoxin.  (04 Dec 2020 00:52)    patient confirmed with positive COVID test.   Xray of the chest with bilateral lower lobe infiltrates.     he denies sick contacts.       Impression:  COVID-19 infection   Viral pneumonia  shortness of breath  lung infiltrates  dependence on oxygen    Plan:    - completed Remdesivir IV daily. on  12/14/2020  x 10 days  - completed dexamethasone course    INFLAMM markers are still elevated, COUPLED with worsened CT chest   started on DOXY AND MERREM STARTED 12/17 to cover post viral PNA;   DUE TO end on 12/26;  can stop sooner if improvement noted    - blood culture negative from 12/16    continue BiPAP    POOR PROGNOSIS     No further specific infectious disease recommendations. Will be available as needed.

## 2020-12-21 NOTE — PROGRESS NOTE ADULT - ASSESSMENT
70y Male with history of PAF on long term AC HTN HLD CAD s/p 3V CABG remote ICM EF ~ 30 to 35% AICD prior cardiac work up PET CT 2019 anterior and anteroseptal wall scar without ischemia EF 36% and TTE 12/2019 EF ~ 30% without any significant valvular disease who presents to ED with acute hypoxic respiratory failure + COVID 19 superimposed bacterial pneumonia sepsis shock on BIPAP therapy and low dose pressor support.  Course complicated by AF RVR with spontaneous conversion.  Currently paced at 60's     1.  DC IV Dig   2.  Amiodarone gtt for now till pt remains on BIPAP   3.  Full dose AC with Lovenox for now   4.  Check BNP with next labs to guide volume status

## 2020-12-21 NOTE — PROGRESS NOTE ADULT - SUBJECTIVE AND OBJECTIVE BOX
Patient is a 70y old  Male who presents with a chief complaint of covid- 19 infection (21 Dec 2020 10:28)    BRIEF HOSPITAL COURSE:   69 y/o male pmhx CAD s/p CABG ( failure to extubate post CABG requiring trach in 2011), s/p AICD, HTN, DM2, admitted on 12/13 w/ COVID-19 pneumonia. Hospital course complicated by superimposed bacterial pneumonia started on abx 12/17, further complicated by afib w. RVR. Patient was RRT last night for worsening hypoxia, agitation  and afib w/ RVR. He was placed on BiPAP and given IV Cardizem and Digoxin.     Events last 24 hours:   No active issues overnight. Dependant on BiPAP for the past few days. On levophed, amiodarone and precedex gtt    PAST MEDICAL & SURGICAL HISTORY:  Diabetes    MI (myocardial infarction)    High cholesterol    Hypertension    AICD (automatic cardioverter/defibrillator) present    S/P CABG x 3      Review of Systems:  Unable to assess given mentation    Physical Examination:    ICU Vital Signs Last 24 Hrs  T(C): 36.4 (21 Dec 2020 12:00), Max: 37.2 (21 Dec 2020 09:00)  T(F): 97.5 (21 Dec 2020 12:00), Max: 98.9 (21 Dec 2020 09:00)  HR: 60 (21 Dec 2020 14:45) (59 - 61)  BP: 100/53 (21 Dec 2020 14:00) (85/60 - 130/68)  BP(mean): 69 (21 Dec 2020 14:00) (61 - 95)  ABP: --  ABP(mean): --  RR: 46 (21 Dec 2020 14:00) (13 - 46)  SpO2: 93% (21 Dec 2020 14:45) (88% - 100%)    Neuro: Sedated/ lethargic. Does not follow commnads  HEENT: Pupils equal, reactive to light, Dry oral mucosa  PULM: Decreased air entry bilaterally no significant adventitious breath sounds   CVS: Regular rhythm and controlled rate, no murmurs, rubs, or gallops  ABD: Soft, nondistended, nontender, normoactive bowel sounds  EXT: No b/l LE edema, nontender with pedal pulse palpable   SKIN: Warm and well perfused, no acute rashes       Medications:  doxycycline IVPB      doxycycline IVPB 100 milliGRAM(s) IV Intermittent every 12 hours  meropenem  IVPB 1000 milliGRAM(s) IV Intermittent every 8 hours  meropenem  IVPB        aMIOdarone Infusion 0.5 mG/Min IV Continuous <Continuous>  norepinephrine Infusion 0.05 MICROgram(s)/kG/Min IV Continuous <Continuous>  phenylephrine    Infusion 1 MICROgram(s)/kG/Min IV Continuous <Continuous>    ALBUTerol    90 MICROgram(s) HFA Inhaler 2 Puff(s) Inhalation every 4 hours PRN  guaiFENesin  milliGRAM(s) Oral every 12 hours    acetaminophen   Tablet .. 650 milliGRAM(s) Oral every 6 hours PRN  acetaminophen   Tablet .. 650 milliGRAM(s) Oral every 6 hours PRN  dexMEDEtomidine Infusion 0.5 MICROgram(s)/kG/Hr IV Continuous <Continuous>  morphine  - Injectable 2 milliGRAM(s) IV Push every 4 hours PRN  ondansetron Injectable 4 milliGRAM(s) IV Push every 6 hours PRN      aspirin enteric coated 81 milliGRAM(s) Oral daily  enoxaparin Injectable 75 milliGRAM(s) SubCutaneous every 12 hours        atorvastatin 40 milliGRAM(s) Oral at bedtime  dextrose 40% Gel 15 Gram(s) Oral once  dextrose 50% Injectable 25 Gram(s) IV Push once  dextrose 50% Injectable 12.5 Gram(s) IV Push once  dextrose 50% Injectable 25 Gram(s) IV Push once  glucagon  Injectable 1 milliGRAM(s) IntraMuscular once  insulin lispro (ADMELOG) corrective regimen sliding scale   SubCutaneous three times a day before meals  insulin lispro (ADMELOG) corrective regimen sliding scale   SubCutaneous at bedtime    ascorbic acid 500 milliGRAM(s) Oral daily  cholecalciferol 1000 Unit(s) Oral daily  dextrose 5%. 1000 milliLiter(s) IV Continuous <Continuous>  dextrose 5%. 1000 milliLiter(s) IV Continuous <Continuous>  lactated ringers. 500 milliLiter(s) IV Continuous <Continuous>  multivitamin/minerals 1 Tablet(s) Oral daily                I&O's Detail    20 Dec 2020 07:01  -  21 Dec 2020 07:00  --------------------------------------------------------  IN:    Amiodarone: 199.8 mL    Amiodarone: 150.3 mL    Dexmedetomidine: 342.4 mL    IV PiggyBack: 300 mL    Lactated Ringers: 2400 mL    Norepinephrine: 56.6 mL  Total IN: 3449.1 mL    OUT:    Indwelling Catheter - Urethral (mL): 710 mL  Total OUT: 710 mL    Total NET: 2739.1 mL      21 Dec 2020 07:01  -  21 Dec 2020 15:02  --------------------------------------------------------  IN:    Amiodarone: 116.9 mL    Dexmedetomidine: 101.2 mL    IV PiggyBack: 50 mL    Lactated Ringers: 800 mL    Norepinephrine: 32.8 mL  Total IN: 1100.9 mL    OUT:    Indwelling Catheter - Urethral (mL): 330 mL  Total OUT: 330 mL    Total NET: 770.9 mL            RADIOLOGY/ Microbiology/ Labs: reviewed

## 2020-12-21 NOTE — PROGRESS NOTE ADULT - ASSESSMENT
Acute hypoxic respiratory failure   COVID 19 +  Viral PNA  Multifocal PNA  AF w/ RVR   Distributive shock    Remains dependant on BiPAP for now. Aim to maintain SpO2 > 88%  Completed Remdesivir 10day on 12/14/2020 and a course of dexamethasone  Continue Doxycycline and Merrem as per ID recs   AF rate controlled on Amiodarone gtt. DC Digoxin as pre Cards. AC w/ full dose Lovenox. Switch levophed to phenylephrine  Avoid nephrotoxic agents. Strict I&O with Cr monitoring   Hydration w/ LR @ 100cc/hr. Keep NPO as pt remains NIV dependant  Contact/airborne isolation    Case d/w Wife Yakelin and updated on his poor clinical status. Pt remains DNR/ DNI

## 2020-12-22 NOTE — PROGRESS NOTE ADULT - SUBJECTIVE AND OBJECTIVE BOX
[place] [facility] - [facility], THE HEART CENTER                                   [address] [place] [address]                                                      PHONE: [phone]                                                         FAX: [phone]  [email or web address]  ---------------------------------------------------------------------------------------------------------------------------------    Overnight events/patient complaints:  NAD on BIPAP     No Known Allergies    MEDICATIONS  (STANDING):  aMIOdarone Infusion 0.5 mG/Min (16.7 mL/Hr) IV Continuous <Continuous>  ascorbic acid 500 milliGRAM(s) Oral daily  aspirin enteric coated 81 milliGRAM(s) Oral daily  atorvastatin 40 milliGRAM(s) Oral at bedtime  cholecalciferol 1000 Unit(s) Oral daily  dexMEDEtomidine Infusion 0.5 MICROgram(s)/kG/Hr (9.19 mL/Hr) IV Continuous <Continuous>  dextrose 40% Gel 15 Gram(s) Oral once  dextrose 5%. 1000 milliLiter(s) (50 mL/Hr) IV Continuous <Continuous>  dextrose 5%. 1000 milliLiter(s) (100 mL/Hr) IV Continuous <Continuous>  dextrose 50% Injectable 25 Gram(s) IV Push once  dextrose 50% Injectable 12.5 Gram(s) IV Push once  dextrose 50% Injectable 25 Gram(s) IV Push once  doxycycline IVPB      doxycycline IVPB 100 milliGRAM(s) IV Intermittent every 12 hours  enoxaparin Injectable 75 milliGRAM(s) SubCutaneous every 12 hours  glucagon  Injectable 1 milliGRAM(s) IntraMuscular once  guaiFENesin  milliGRAM(s) Oral every 12 hours  insulin lispro (ADMELOG) corrective regimen sliding scale   SubCutaneous three times a day before meals  insulin lispro (ADMELOG) corrective regimen sliding scale   SubCutaneous at bedtime  lactated ringers. 500 milliLiter(s) (100 mL/Hr) IV Continuous <Continuous>  meropenem  IVPB 1000 milliGRAM(s) IV Intermittent every 8 hours  meropenem  IVPB      multivitamin/minerals 1 Tablet(s) Oral daily  phenylephrine    Infusion 1 MICROgram(s)/kG/Min (27.6 mL/Hr) IV Continuous <Continuous>    MEDICATIONS  (PRN):  acetaminophen   Tablet .. 650 milliGRAM(s) Oral every 6 hours PRN Temp greater or equal to 38C (100.4F)  acetaminophen   Tablet .. 650 milliGRAM(s) Oral every 6 hours PRN Mild Pain (1 - 3)  ALBUTerol    90 MICROgram(s) HFA Inhaler 2 Puff(s) Inhalation every 4 hours PRN Shortness of Breath and/or Wheezing  morphine  - Injectable 2 milliGRAM(s) IV Push every 4 hours PRN SOB  ondansetron Injectable 4 milliGRAM(s) IV Push every 6 hours PRN Nausea and/or Vomiting      Vital Signs Last 24 Hrs  T(C): 37.3 (22 Dec 2020 06:00), Max: 37.3 (22 Dec 2020 06:00)  T(F): 99.1 (22 Dec 2020 06:00), Max: 99.1 (22 Dec 2020 06:00)  HR: 60 (22 Dec 2020 08:23) (60 - 61)  BP: 122/79 (22 Dec 2020 08:00) (79/67 - 155/78)  BP(mean): 90 (22 Dec 2020 08:00) (51 - 123)  RR: 18 (22 Dec 2020 07:00) (18 - 46)  SpO2: 59% (22 Dec 2020 08:23) (59% - 100%)  ICU Vital Signs Last 24 Hrs  YULI HEADLEY  I&O's Detail    21 Dec 2020 07:01  -  22 Dec 2020 07:00  --------------------------------------------------------  IN:    Amiodarone: 116.9 mL    Dexmedetomidine: 416 mL    IV PiggyBack: 200 mL    Lactated Ringers: 2400 mL    Norepinephrine: 32.8 mL    Phenylephrine: 470.8 mL  Total IN: 3636.5 mL    OUT:    Indwelling Catheter - Urethral (mL): 915 mL  Total OUT: 915 mL    Total NET: 2721.5 mL        I&O's Summary    21 Dec 2020 07:01  -  22 Dec 2020 07:00  --------------------------------------------------------  IN: 3636.5 mL / OUT: 915 mL / NET: 2721.5 mL      Drug Dosing Weight  YULI [facility]      PHYSICAL EXAM:  General: Appears well developed, well nourished alert and cooperative.  HEENT: Head; normocephalic, atraumatic.  Eyes: Pupils reactive, cornea wnl.  Neck: Supple, no nodes adenopathy, no NVD or carotid bruit or thyromegaly.  CARDIOVASCULAR: Normal S1 and S2, 2/6 murmur, rub, gallop or lift.   LUNGS: Decrease BS B/L on BIPAP   ABDOMEN: Soft, nontender without mass or organomegaly. bowel sounds normoactive.  EXTREMITIES: No clubbing, cyanosis + edema. Distal pulses wnl.   SKIN: warm and dry with normal turgor.          LABS:                        13.6   27.18 )-----------( 100      ( 21 Dec 2020 04:21 )             40.0     12-21    140  |  106  |  52.0<H>  ----------------------------<  154<H>  4.9   |  22.0  |  0.53    Ca    8.1<L>      21 Dec 2020 04:21  Phos  3.5     12-21  Mg     2.0     12-21      [facility]            RADIOLOGY & ADDITIONAL STUDIES:    INTERPRETATION OF TELEMETRY (personally reviewed): NSR paced       70y Male with history of PAF on long term AC HTN HLD CAD s/p 3V CABG remote ICM EF ~ 30 to 35% AICD prior cardiac work up PET CT 2019 anterior and anteroseptal wall scar without ischemia EF 36% and TTE 12/2019 EF ~ 30% without any significant valvular disease who presents to ED with acute hypoxic respiratory failure + COVID 19 superimposed bacterial pneumonia sepsis shock on BIPAP therapy and low dose pressor support.  Course complicated by AF RVR with spontaneous conversion.  Currently paced at 60's     Plan  1.  Amiodarone gtt for now since patient on BIPAP   2.  Full dose AC with Lovenox for now   3.  ID follow up    Care as per MICU team     poor prognosis

## 2020-12-22 NOTE — PROGRESS NOTE ADULT - SUBJECTIVE AND OBJECTIVE BOX
Patient is a 70y old  Male who presents with a chief complaint of covid- 19 infection (21 Dec 2020 15:02)      HPI/BRIEF HOSPITAL COURSE:   71 y/o male pmhx CAD s/p CABG ( failure to extubate post CABG requiring trach in 2011), s/p AICD, HTN, DM2, admitted on 12/13 w/ COVID-19 pneumonia. Hospital course complicated by superimposed bacterial pneumonia started on abx 12/17, further complicated by afib w. RVR. Patient was RRT last night for worsening hypoxia, agitation  and afib w/ RVR. He was placed on BiPAP and given IV Cardizem and Digoxin.       Events last 24 hours:   Worsening Pressor requirement overnight with Phenylephrine; s/p Levo phed and amio infusion   Worsening resp distress on BiPAP  Maximum Precedex infusion dose     PAST MEDICAL & SURGICAL HISTORY:  Diabetes    MI (myocardial infarction)    High cholesterol    Hypertension    AICD (automatic cardioverter/defibrillator) present    S/P CABG x 3        Could not obtain ROS due to underlying mentation    Physical Examination:    ICU Vital Signs Last 24 Hrs  T(C): 36 (21 Dec 2020 16:00), Max: 37.2 (21 Dec 2020 09:00)  T(F): 96.8 (21 Dec 2020 16:00), Max: 98.9 (21 Dec 2020 09:00)  HR: 61 (22 Dec 2020 03:52) (60 - 61)  BP: 120/61 (22 Dec 2020 03:00) (79/67 - 153/84)  BP(mean): 74 (22 Dec 2020 03:00) (52 - 123)  ABP: --  ABP(mean): --  RR: 24 (22 Dec 2020 03:00) (20 - 46)  SpO2: 96% (22 Dec 2020 03:52) (88% - 100%)      General: severe resp distress      Neuro: Obtunded, groaning to painful stimuli     HEENT: Pupils equal, reactive to light, Oral mucosa dry     PULM: Clear to auscultation bilaterally rales at bases, accessory muscles of breathing used   CVS: Irregular rhythm and Increased rate, no murmurs, rubs, or gallops    ABD: Soft, distended, nontender, normoactive bowel sounds, no CVA tenderness    EXT: 1+b/l LE edema, nontender with pedal pulse palpable     SKIN: Warm and well perfused, no acute rashes       Medications:  doxycycline IVPB      doxycycline IVPB 100 milliGRAM(s) IV Intermittent every 12 hours  meropenem  IVPB 1000 milliGRAM(s) IV Intermittent every 8 hours  meropenem  IVPB      aMIOdarone Infusion 0.5 mG/Min IV Continuous <Continuous>  phenylephrine    Infusion 1 MICROgram(s)/kG/Min IV Continuous <Continuous>  ALBUTerol    90 MICROgram(s) HFA Inhaler 2 Puff(s) Inhalation every 4 hours PRN  guaFENesin  milliGRAM(s) Oral every 12 hours  acetaminophen   Tablet .. 650 milliGRAM(s) Oral every 6 hours PRN  acetaminophen   Tablet .. 650 milliGRAM(s) Oral every 6 hours PRN  dexMEDEtomidine Infusion 0.5 MICROgram(s)/kG/Hr IV Continuous <Continuous>  morphine  - Injectable 2 milliGRAM(s) IV Push every 4 hours PRN  ondansetron Injectable 4 milliGRAM(s) IV Push every 6 hours PRN  aspirin enteric coated 81 milliGRAM(s) Oral daily  enoxaparin Injectable 75 milliGRAM(s) SubCutaneous every 12 hours  atorvastatin 40 milliGRAM(s) Oral at bedtime  dextrose 40% Gel 15 Gram(s) Oral once  dextrose 50% Injectable 25 Gram(s) IV Push once  dextrose 50% Injectable 12.5 Gram(s) IV Push once  dextrose 50% Injectable 25 Gram(s) IV Push once  glucagon  Injectable 1 milliGRAM(s) IntraMuscular once  insulin lispro (ADMELOG) corrective regimen sliding scale   SubCutaneous three times a day before meals  insulin lispro (ADMELOG) corrective regimen sliding scale   SubCutaneous at bedtime    ascorbic acid 500 milliGRAM(s) Oral daily  cholecalciferol 1000 Unit(s) Oral daily  dextrose 5%. 1000 milliLiter(s) IV Continuous <Continuous>  dextrose 5%. 1000 milliLiter(s) IV Continuous <Continuous>  lactated ringers. 500 milliLiter(s) IV Continuous <Continuous>  multivitamin/minerals 1 Tablet(s) Oral daily      I&O's Detail    20 Dec 2020 07:01  -  21 Dec 2020 07:00  --------------------------------------------------------  IN:    Amiodarone: 199.8 mL    Amiodarone: 150.3 mL    Dexmedetomidine: 342.4 mL    IV PiggyBack: 300 mL    Lactated Ringers: 2400 mL    Norepinephrine: 56.6 mL  Total IN: 3449.1 mL    OUT:    Indwelling Catheter - Urethral (mL): 710 mL  Total OUT: 710 mL    Total NET: 2739.1 mL      21 Dec 2020 07:01  -  22 Dec 2020 06:09  --------------------------------------------------------  IN:    Amiodarone: 116.9 mL    Dexmedetomidine: 305.6 mL    IV PiggyBack: 200 mL    Lactated Ringers: 2100 mL    Norepinephrine: 32.8 mL    Phenylephrine: 305.6 mL  Total IN: 3060.9 mL    OUT:    Indwelling Catheter - Urethral (mL): 775 mL  Total OUT: 775 mL    Total NET: 2285.9 mL            RADIOLOGY/ Microbiology/ Labs: reviewed     I have personally [id number]  minutes of critical care time excluding time spent on separate procedures

## 2020-12-22 NOTE — PROGRESS NOTE ADULT - ASSESSMENT
Acute hypoxic respiratory failure   COVID 19 PNA  Severe ARDS   Multifocal PNA, mostly secondary bacterial   AF w/ RVR   Distributive shock    Remains dependant on BiPAP for now. Aim to maintain SpO2 > 88%, worsening resp distress with RR in high 30's to mid40's this AM  Completed Remdesivir 10day on 12/14/2020 and a course of dexamethasone  Continue Doxycycline and Merrem as per ID recs empirically, worsening inflammatory markers  AF rate controlled on Amiodarone gtt. DC Digoxin as pre Cards. AC w/ full dose Lovenox. Increasing dose of phenylephrine; LR continued for now   Avoid nephrotoxic agents. Strict I&O with Cr monitoring   Keep NPO as pt remains NIV dependant  Contact/airborne isolation    Case d/w Wife Yakelin and updated on his poor clinical status.   Pt remains DNR/ DNI  Family encouraged to visit him and contemplate changing GOC to comfort only, Wife acknowledged

## 2020-12-23 NOTE — PROGRESS NOTE ADULT - REASON FOR ADMISSION
covid- 19 infection

## 2020-12-23 NOTE — PROGRESS NOTE ADULT - SUBJECTIVE AND OBJECTIVE BOX
[place] [facility] - [facility], THE HEART CENTER                                   [address] [place] [address]                                                      PHONE: [phone]                                                         FAX: [phone]  [email or web address]  ---------------------------------------------------------------------------------------------------------------------------------    Overnight events/patient complaints:  NAD on BIPAP     No Known Allergies    MEDICATIONS  (STANDING):  dexMEDEtomidine Infusion 1.5 MICROgram(s)/kG/Hr (27.6 mL/Hr) IV Continuous <Continuous>  doxycycline IVPB 100 milliGRAM(s) IV Intermittent every 12 hours  enoxaparin Injectable 70 milliGRAM(s) SubCutaneous two times a day  LORazepam   Injectable 2 milliGRAM(s) IV Push once  meropenem  IVPB 1000 milliGRAM(s) IV Intermittent every 24 hours  phenylephrine    Infusion 1 MICROgram(s)/kG/Min (13.8 mL/Hr) IV Continuous <Continuous>    MEDICATIONS  (PRN):      Vital Signs Last 24 Hrs  T(C): 36.1 (23 Dec 2020 08:00), Max: 36.5 (22 Dec 2020 17:00)  T(F): 96.9 (23 Dec 2020 08:00), Max: 97.7 (22 Dec 2020 17:00)  HR: 84 (23 Dec 2020 08:50) (60 - 154)  BP: 89/58 (23 Dec 2020 08:09) (76/38 - 150/69)  BP(mean): 63 (23 Dec 2020 08:09) (42 - 93)  RR: 25 (23 Dec 2020 08:50) (20 - 34)  SpO2: 84% (23 Dec 2020 08:50) (73% - 100%)  ICU Vital Signs Last 24 Hrs  YULI LAGANAS  I&O's Detail    22 Dec 2020 07:01  -  23 Dec 2020 07:00  --------------------------------------------------------  IN:    Dexmedetomidine: 303.6 mL    IV PiggyBack: 200 mL    Lactated Ringers: 2200 mL    Lactated Ringers Bolus: 100 mL    Phenylephrine: 518.1 mL  Total IN: 3321.7 mL    OUT:    Indwelling Catheter - Urethral (mL): 1800 mL  Total OUT: 1800 mL    Total NET: 1521.7 mL        I&O's Summary    22 Dec 2020 07:01  -  23 Dec 2020 07:00  --------------------------------------------------------  IN: 3321.7 mL / OUT: 1800 mL / NET: 1521.7 mL      Drug Dosing Weight  YULI HEADLEY      PHYSICAL EXAM:  General: Appears well developed, well nourished alert and cooperative.  HEENT: Head; normocephalic, atraumatic.  Eyes: Pupils reactive, cornea wnl.  Neck: Supple, no nodes adenopathy, no NVD or carotid bruit or thyromegaly.  CARDIOVASCULAR: Normal S1 and S2, 2/6 murmur, rub, gallop or lift.   LUNGS: ++ rhonchi  ABDOMEN: Soft, nontender without mass or organomegaly. bowel sounds normoactive.  EXTREMITIES: No clubbing, cyanosis ++ edema. Distal pulses wnl.   SKIN: warm and dry with normal turgor.          LABS:                        14.9   28.22 )-----------( 95       ( 23 Dec 2020 04:52 )             44.5     12-23    145  |  114<H>  |  35.0<H>  ----------------------------<  117<H>  5.1   |  19.0<L>  |  0.50    Ca    8.0<L>      23 Dec 2020 04:52    TPro  5.5<L>  /  Alb  1.6<L>  /  TBili  1.0  /  DBili  x   /  AST  54<H>  /  ALT  29  /  AlkPhos  219<H>  12-23    [facility]            RADIOLOGY & ADDITIONAL STUDIES:    INTERPRETATION OF TELEMETRY (personally reviewed):    INTERPRETATION OF TELEMETRY (personally reviewed): NSR paced       70y Male with history of PAF on long term AC HTN HLD CAD s/p 3V CABG remote ICM EF ~ 30 to 35% AICD prior cardiac work up PET CT 2019 anterior and anteroseptal wall scar without ischemia EF 36% and TTE 12/2019 EF ~ 30% without any significant valvular disease who presents to ED with acute hypoxic respiratory failure + COVID 19 superimposed bacterial pneumonia sepsis shock on BIPAP therapy and low dose pressor support.  Course complicated by AF RVR with spontaneous conversion.  Currently paced at 60's;     Full dose AC with Lovenox for now     Care as per MICU team     poor prognosis

## 2020-12-23 NOTE — GOALS OF CARE CONVERSATION - ADVANCED CARE PLANNING - TREATMENT GUIDELINE COMMENT
- Morphine infusion, Ativan push then PRN   - Stopping all other treatment and escalating meds for comfort only   - Emotional support provided to Family by me and LANDRY Gusman Love and all questions answered

## 2020-12-23 NOTE — GOALS OF CARE CONVERSATION - ADVANCED CARE PLANNING - CONVERSATION DETAILS
Worsening medical condition update including increasing vasoactive medication dosage.   Also mentioned that patient begged to get off of BiPAP and on HFNC and NRB desaturating to low 70's and RR in 40's and hence with existing wishes of DNR and DNI, now chaged GOC to Comfort only
Extensive goals of care discussion had with family, approximately 15 minute conversation, including wife Yakelin.    I explained the severity of patient's condition and that if he were to be intubated, the likelihood of survival is extremely low. I discussed that should his condition worsens that we will initiate comfort measures. They agreed for a DNR. CPR was discussed and I also clarified that CPR would also be inappropriate, and even if ROSC achieved he will likely have permanent brain damage/debility. They agree for a DNR as well.     DNR, and DNI. Will sign MOLST form. Will continue to update family with clinical condition. If deteriorates will initiate comfort measures.     Ash Maldonado M.D.  Pulmonary & Critical Care Medicine  [facility]  Pulmonary Medicine at [place]  [address]  [place] [address]  T: [phone]  F: [phone]

## 2020-12-23 NOTE — DISCHARGE NOTE FOR THE EXPIRED PATIENT - HOSPITAL COURSE
69 y/o male pmhx CAD s/p CABG ( failure to extubate post CABG requiring trach in 2011), s/p AICD, HTN, DM2, admitted on 12/13 w/ COVID-19 pneumonia. Hospital course complicated by superimposed bacterial pneumonia started on abx 12/17, further complicated by afib w. RVR. Patient was RRT last night for worsening hypoxia, agitation  and afib w/ RVR. He was placed on BiPAP and given IV Cardizem and Digoxin.    Pt had worsening hypoxia and respiratory failure but was DNI/DNR

## 2020-12-23 NOTE — PROGRESS NOTE ADULT - PROVIDER SPECIALTY LIST ADULT
Critical Care
Critical Care
Hospitalist
Infectious Disease
Cardiology
Hospitalist
Infectious Disease
Cardiology
Cardiology
Critical Care
Infectious Disease
Infectious Disease
Hospitalist
Infectious Disease
Cardiology
Hospitalist

## 2020-12-23 NOTE — CHART NOTE - NSCHARTNOTESELECT_GEN_ALL_CORE
Event Note
Nutrition Services
Event Note
Nutrition Services

## 2020-12-23 NOTE — CHART NOTE - NSCHARTNOTEFT_GEN_A_CORE
Wife called back changing her miond and would not want comfor measures and want to continue with BIPAP with PRN Morphine and Precedex with Amio infusion and Abx as before and if he struggles, call her back and decide on GOC as clinical course dictates

## 2022-08-01 NOTE — PHYSICAL THERAPY INITIAL EVALUATION ADULT - LEVEL OF INDEPENDENCE: CHAIR TO BED, REHAB EVAL
n/a Ketoconazole Counseling:   Patient counseled regarding improving absorption with orange juice.  Adverse effects include but are not limited to breast enlargement, headache, diarrhea, nausea, upset stomach, liver function test abnormalities, taste disturbance, and stomach pain.  There is a rare possibility of liver failure that can occur when taking ketoconazole. The patient understands that monitoring of LFTs may be required, especially at baseline. The patient verbalized understanding of the proper use and possible adverse effects of ketoconazole.  All of the patient's questions and concerns were addressed.

## 2023-02-09 NOTE — PHYSICAL THERAPY INITIAL EVALUATION ADULT - LEVEL OF INDEPENDENCE: SUPINE/SIT, REHAB EVAL
[address] ENCOUNTER      Pt Name: Woo Conner  MRN: [id number]  [place] 1944  Date of evaluation: 2/8/2023  Provider: Tiffanie Calle MD  Insurance:  Payor: Heena Martinez / Plan: Gilshama Mcdowell / Product Type: *No Product type* /   Current Code Status   Code Status: Prior     CHIEF COMPLAINT       Chief Complaint   Patient presents with    Headache    Dizziness     Dizzy and lightheaded. Headache since this morning         HISTORY OF PRESENT ILLNESS    HPI    Nursing Notes were reviewed. This is a [de-identified] y.o. male who presents to the emergency department with 24 hours of lightheaded/dizzy sensation. The patient says he is having increasing difficulty ambulating and feels that he is off balance over the past 24 hours. He says that the off-balance sensation has been getting worse over the past few hours. He describes a mild chronic headache has been present for over a year but worsening headache starting this morning. He denies new neck stiffness. Denies cough. He denies chest pain or difficulty breathing. He denies palpitations. He says he is compliant with his current medication. He does have some labile variable blood pressure that his cardiologist and PCP are currently attempting to normalize. PAST MEDICAL HISTORY     Past Medical History:   Diagnosis Date    Arthritis     CAD (coronary artery disease)     Hyperlipidemia     Hypertension     S/P ablation of atrial fibrillation 01/21/2021    Atrial fibrillation and atrial flutter ablation by Dr. Sisi Andujar.          SURGICAL HISTORY       Past Surgical History:   Procedure Laterality Date    COLONOSCOPY  2/4/13    Diverticulosis    CYST REMOVAL      baker cyst on right knee    HERNIA REPAIR      PTCA           CURRENT MEDICATIONS       Previous Medications    ACETAMINOPHEN (TYLENOL) 325 MG TABLET    Take 650 mg by mouth as needed for Pain    APPLE CIDER VINEGAR PO    Take by mouth daily Gummies    AZELASTINE (ASTELIN) 0.1 % NASAL SPRAY    1 spray by Nasal route 2 times daily Use in each nostril as directed    BUPROPION (WELLBUTRIN SR) 150 MG EXTENDED RELEASE TABLET    Take 1 tablet by mouth daily    CITALOPRAM (CELEXA) 10 MG TABLET    Take 1 tablet by mouth daily    CLONIDINE (CATAPRES) 0.1 MG TABLET    TAKE 1 TABLET BY MOUTH DAILY PATIENT TO TAKE IF SBP IS GREATER THAN 170    COLLAGEN PO    Take by mouth daily Gummies    ELIQUIS 5 MG TABS TABLET    TAKE 1 TABLET BY MOUTH TWICE A DAY    FLUTICASONE (FLONASE) 50 MCG/ACT NASAL SPRAY    1 spray by Each Nostril route 2 times daily    MULTIPLE VITAMINS-MINERALS (MULTIVITAMIN MEN 50+ PO)    Take 1 tablet by mouth daily    ROSUVASTATIN (CRESTOR) 20 MG TABLET    Take 20 mg by mouth nightly    SOTALOL (BETAPACE) 80 MG TABLET    Take 0.5 tablets by mouth 2 times daily    SPIRONOLACTONE (ALDACTONE) 25 MG TABLET    Take 1 tablet by mouth daily       ALLERGIES     Patient has no known allergies. FAMILY HISTORY       Family History   Problem Relation Age of Onset    Hypertension Mother     Cancer Sister           SOCIAL HISTORY       Social History     Socioeconomic History    Marital status:      Spouse name: None    Number of children: None    Years of education: None    Highest education level: None   Tobacco Use    Smoking status: Never    Smokeless tobacco: Never   Substance and Sexual Activity    Alcohol use: Yes     Comment: occas    Drug use: No     Comment: 2 cups of coffee daily     Sexual activity: Not Currently     Partners: Female       PHYSICAL EXAM       ED Triage Vitals [02/08/23 1851]   BP Temp Temp Source Heart Rate Resp SpO2 Height Weight   (!) 183/96 97.6 °F (36.4 °C) Oral 66 16 98 % 5' 10\" (1.778 m) 162 lb (73.5 kg)       Physical Exam  Vitals and nursing note reviewed. Constitutional:       General: He is not in acute distress. Appearance: Normal appearance.    HENT:      Head: Normocephalic and atraumatic. Nose: Nose normal.      Mouth/Throat:      Mouth: Mucous membranes are moist.   Eyes:      Extraocular Movements: Extraocular movements intact. Neck:      Vascular: No carotid bruit. Cardiovascular:      Rate and Rhythm: Normal rate. Rhythm irregular. Pulmonary:      Effort: Pulmonary effort is normal.      Breath sounds: Normal breath sounds. Abdominal:      Palpations: Abdomen is soft. Tenderness: There is no abdominal tenderness. Musculoskeletal:         General: Normal range of motion. Cervical back: Normal range of motion. No rigidity or tenderness. Lymphadenopathy:      Cervical: No cervical adenopathy. Skin:     General: Skin is warm and dry. Capillary Refill: Capillary refill takes less than 2 seconds. Neurological:      General: No focal deficit present. Mental Status: He is alert and oriented to person, place, and time. GCS: GCS eye subscore is 4. GCS verbal subscore is 5. GCS motor subscore is 6. Cranial Nerves: No cranial nerve deficit. Sensory: No sensory deficit. Motor: No weakness. Coordination: Coordination normal.      Gait: Gait abnormal.      Comments: Mild gait instability       Vitals:    Vitals:    02/08/23 1851 02/08/23 1934 02/08/23 2022 02/08/23 2030   BP: (!) 183/96 (!) 189/91 (!) 143/81 (!) 153/84   Pulse: 66 66 64 61   Resp: 16 12 24 12   Temp: 97.6 °F (36.4 °C)      TempSrc: Oral      SpO2: 98% 100% 100% 99%   Weight: 162 lb (73.5 kg)      Height: 5' 10\" (1.778 m)          DIAGNOSTIC RESULTS     EKG: All EKG's are interpreted by the Emergency Department Physician who either signs or Co-signs this chart in the absence of a cardiologist.    Rate controlled atrial fibrillation. 63.  QRS is 88. QTc is 421. There are no abnormal ST elevations or depressions. There is no ventricular ectopy.   There is no significant change from prior EKG from November 2022    RADIOLOGY:   Non-plain film images such as CT, Ultrasound and MRI are read by the radiologist. Plain radiographic images are visualized and preliminarily interpreted by the emergency physician with the below findings:    Interpretation per the Radiologist below, if available at the time of this note:     [facility]   Final Result   1. No significant stenosis or occlusion of the cervical vasculature. 2. No large vessel occlusion intracranially. RECOMMENDATIONS:   Unavailable         CT Head W/O Contrast   Final Result   No acute intracranial abnormality. Air-fluid level in the right maxillary sinus suggests acute sinusitis. XR CHEST (2 VW)   Final Result   Stable chest x-ray. No acute disease. LABS:  Labs Reviewed   CBC WITH AUTO DIFFERENTIAL - Abnormal; Notable for the following components:       Result Value    Hematocrit 41.9 (*)     Lymphocytes % 22.0 (*)     Monocytes % 11.9 (*)     All other components within normal limits   COMPREHENSIVE METABOLIC PANEL - Abnormal; Notable for the following components:    Sodium 129 (*)     Chloride 94 (*)     BUN 24 (*)     Creatinine 1.4 (*)     Est, Glom Filt Rate 51 (*)     All other components within normal limits   COVID-19, RAPID   RAPID INFLUENZA A/B ANTIGENS   MAGNESIUM   TROPONIN   URINALYSIS   BASIC METABOLIC PANEL   POCT GLUCOSE       All other labs were within normal range or not returned as of this dictation. MEDICAL DECISION MAKING     Medications   0.9 % sodium chloride bolus (has no administration in time range)   0.9 % sodium chloride bolus (0 mLs IntraVENous Stopped 2/8/23 2316)   iopamidol (ISOVUE-370) 76 % injection 75 mL (75 mLs IntraVENous Given 2/8/23 2212)       NIH Stroke Scale  Interval: Baseline  Level of Consciousness (1a): Alert  LOC Questions (1b): Answers both correctly  LOC Commands (1c): Performs both tasks correctly  Best Gaze (2): Normal  Visual (3): No visual loss  Facial Palsy (4): Normal symmetrical movement  Motor Arm, Left (5a):  No drift  Motor Arm, Right (5b): No drift  Motor Leg, Left (6a): No drift  Motor Leg, Right (6b): No drift  Limb Ataxia (7): Absent  Sensory (8): Normal  Best Language (9): No aphasia  Dysarthria (10): Normal  Extinction and Inattention (11): No abnormality  Total: 0     [place] Coma Scale  Eye Opening: Spontaneous  Best Verbal Response: Oriented  Best Motor Response: Obeys commands  [place] Coma Scale Score: 15                     CIWA Assessment  BP: (!) 153/84  Heart Rate: 64                 MDM  This is a [de-identified] y.o. male who presents to the emergency department with 24 hours of lightheaded/dizzy sensation. The patient says he is having increasing difficulty ambulating and feels that he is off balance over the past 24 hours. He says that the off-balance sensation has been getting worse over the past few hours. He describes a mild chronic headache has been present for over a year but worsening headache starting this morning. He denies new neck stiffness. Denies cough. He denies chest pain or difficulty breathing. He denies palpitations. He says he is compliant with his current medication. He does have some labile variable blood pressure that his cardiologist and PCP are currently attempting to normalize. The patient has no clear nystagmus, however his symptoms and medical history raised concern for potential acute posterior circulation stroke. I had a detailed conversation with the patient regarding the risks and benefits of IV contrast.  Patient understands the risks of IV contrast nephropathy, as well as the benefits of potential stroke diagnosis. Patient has elected to have the CT with IV contrast to evaluate for potential stroke. Patient has a significant hyponatremia at 129. Liver function enzymes are normal indicating a low likelihood of acute cholecystitis or hepatitis. Hemoglobin hematocrit are normal indicating no evidence of significant anemia.     ECG shows no ST elevations and cardiac enzymes are normal indicating a low likelihood of STEMI or NSTEMI    Chest x-ray was completed and shows no evidence of significant intrathoracic pathology including no evidence of pneumonia, pneumothorax, and a low likelihood of aortic disease. CT of the head shows no evidence of intracranial hemorrhage. CT angiography shows no evidence of acute vascular occlusion or evidence of large vessel disease/stroke. COVID-19 and influenza testing are negative. The patient had significant hypertension on arrival with systolic pressures well over 180 mmHg. This has spontaneously improved. The patient's dizziness improved in conjunction with the spontaneous improvement of blood pressure raising the potential for hypertensive urgency. CT imaging of the head did demonstrate some sinusitis. The patient's symptoms of dizziness have been present for a number of months and this may indicate chronic sinusitis. Repeat metabolic panel pending to evaluate the patient's hyponatremia. If this improves, patient can likely be discharged home with follow-up with his nephrologist and primary care providers. The patient will be signed out to Dr. Devin Mata for final disposition. Please see his documentation for the final impression and plan of this patient. Pending mobilizing sodium levels, patient be discharged home with antibiotics for his chronic sinusitis, symptomatic treatment, and instructions to follow-up with his outpatient providers. Clinical Diagnoses Addressed  1. Hypertensive urgency    2. Dizziness    3. Chronic maxillary sinusitis        CONSULTS:  None    PROCEDURES:  Unless otherwise noted below, none     Procedures      FINAL IMPRESSION      1. Hypertensive urgency    2. Dizziness    3.  Chronic maxillary sinusitis          DISPOSITION/PLAN   DISPOSITION Decision To Discharge 02/08/2023 11:17:33 PM      PATIENT REFERRED TO:  Wilmer Grijalva MD  [address] [place] [address]     Call in 1 day      DISCHARGE MEDICATIONS:  New Prescriptions    AZITHROMYCIN (ZITHROMAX) 250 MG TABLET    Take 1 tablet by mouth See Admin Instructions for 5 days 500mg on day 1 followed by 250mg on days 2 - 5    OXYMETAZOLINE (12 HOUR NASAL SPRAY) 0.05 % NASAL SPRAY    2 sprays by Nasal route 2 times daily as needed for Congestion 3 day maximum use. Controlled Substances Monitoring:     No flowsheet data found.     Shannon Araujo MD (electronically signed)  Attending Emergency Physician            Shannon Araujo MD  02/08/23 1776 independent